# Patient Record
Sex: FEMALE | Race: WHITE | HISPANIC OR LATINO | Employment: FULL TIME | URBAN - NONMETROPOLITAN AREA
[De-identification: names, ages, dates, MRNs, and addresses within clinical notes are randomized per-mention and may not be internally consistent; named-entity substitution may affect disease eponyms.]

---

## 2017-02-09 ENCOUNTER — OFFICE VISIT (OUTPATIENT)
Dept: GASTROENTEROLOGY | Facility: CLINIC | Age: 52
End: 2017-02-09

## 2017-02-09 ENCOUNTER — PREP FOR SURGERY (OUTPATIENT)
Dept: GASTROENTEROLOGY | Facility: CLINIC | Age: 52
End: 2017-02-09

## 2017-02-09 VITALS
BODY MASS INDEX: 26.12 KG/M2 | HEIGHT: 64 IN | HEART RATE: 79 BPM | WEIGHT: 153 LBS | DIASTOLIC BLOOD PRESSURE: 80 MMHG | RESPIRATION RATE: 15 BRPM | TEMPERATURE: 98.6 F | SYSTOLIC BLOOD PRESSURE: 130 MMHG

## 2017-02-09 DIAGNOSIS — Z12.11 COLON CANCER SCREENING: Primary | ICD-10-CM

## 2017-02-09 DIAGNOSIS — K59.00 CONSTIPATION, UNSPECIFIED CONSTIPATION TYPE: Chronic | ICD-10-CM

## 2017-02-09 DIAGNOSIS — K59.00 CONSTIPATION, UNSPECIFIED CONSTIPATION TYPE: ICD-10-CM

## 2017-02-09 PROCEDURE — 99214 OFFICE O/P EST MOD 30 MIN: CPT | Performed by: NURSE PRACTITIONER

## 2017-02-09 RX ORDER — SODIUM CHLORIDE 9 MG/ML
70 INJECTION, SOLUTION INTRAVENOUS CONTINUOUS PRN
Status: CANCELLED | OUTPATIENT
Start: 2017-02-09

## 2017-02-09 RX ORDER — SODIUM CHLORIDE 0.9 % (FLUSH) 0.9 %
1-10 SYRINGE (ML) INJECTION AS NEEDED
Status: CANCELLED | OUTPATIENT
Start: 2017-02-09

## 2017-02-09 NOTE — PROGRESS NOTES
[address] [facility] [address]    (H) [phone]  (W) [phone]    Chief Complaint   Patient presents with   • Constipation   • Colon Cancer Screening     The patient denies recent change in bowel habits. There is no diarrhea. There is a long-standing history of constipation. The patient states she may have 1 bowel movement daily, but occasionally may go 2-3 days before having a bowel movement. She is not taking anything for the constipation. There is no history of abdominal pain. There is no history of overt GI bleed (hematemesis melena or hematochezia). The patient denies nausea or vomiting. There is no history of reflux. The patient denies dysphagia or odynophagia. There is no history of recent significant weight loss. There is no history of liver disease in the past. There is no family history of colon cancer. The patient has not had a colonoscopy in the past.    Constipation   This is a chronic problem. The current episode started more than 1 year ago (over 2 years). The problem is unchanged. Her stool frequency is 1 time per day. The stool is described as firm. The patient is on a high fiber diet. She does not exercise regularly. There has not been adequate water intake. Associated symptoms include back pain. Pertinent negatives include no abdominal pain, diarrhea, fever, hematochezia, melena, nausea or vomiting. She has tried nothing for the symptoms.     Review of Systems   Constitutional: Negative for appetite change, chills, fatigue, fever and unexpected weight change.   HENT: Negative for mouth sores, nosebleeds and trouble swallowing.    Eyes: Negative for discharge and redness.   Respiratory: Negative for apnea, cough and shortness of breath.    Cardiovascular: Positive for palpitations. Negative for chest pain and leg swelling.   Gastrointestinal: Positive for constipation. Negative for abdominal distention, abdominal pain, anal bleeding, blood in stool, diarrhea, hematochezia, melena, nausea  "and vomiting.   Endocrine: Negative for cold intolerance, heat intolerance and polydipsia.   Genitourinary: Negative for dysuria, hematuria and urgency.   Musculoskeletal: Positive for arthralgias and back pain. Negative for joint swelling and myalgias.   Skin: Negative for rash.   Allergic/Immunologic: Negative for food allergies and immunocompromised state.   Neurological: Negative for dizziness, seizures, syncope and headaches.   Hematological: Negative for adenopathy. Does not bruise/bleed easily.   Psychiatric/Behavioral: Negative for dysphoric mood. The patient is not nervous/anxious and is not hyperactive.      Patient Active Problem List   Diagnosis   • Routine adult health maintenance   • Hyperlipidemia   • Colon cancer screening   • Constipation     Past Medical History   Diagnosis Date   • Lower back pain    • MVP (mitral valve prolapse)    • Palpitations    • Snores      Past Surgical History   Procedure Laterality Date   • Partial hysterectomy  2003   •  section       Family History   Problem Relation Age of Onset   • Pancreatic cancer Mother    • Colon cancer Neg Hx    • Liver cancer Neg Hx    • Liver disease Neg Hx    • Stomach cancer Neg Hx    • Esophageal cancer Neg Hx      Social History   Substance Use Topics   • Smoking status: Never Smoker   • Smokeless tobacco: Not on file   • Alcohol use No     No current outpatient prescriptions on file.  No Known Allergies  Visit Vitals   • /80   • Pulse 79   • Temp 98.6 °F (37 °C)   • Resp 15   • Ht 64\" (162.6 cm)   • Wt 153 lb (69.4 kg)   • BMI 26.26 kg/m2     Physical Exam   Constitutional: She is oriented to person, place, and time. She appears well-developed and well-nourished. No distress.   HENT:   Head: Normocephalic and atraumatic.   Right Ear: Hearing and external ear normal.   Left Ear: Hearing and external ear normal.   Nose: Nose normal.   Mouth/Throat: Oropharynx is clear and moist and mucous membranes are normal. Mucous " membranes are not pale, not dry and not cyanotic. No oral lesions. No oropharyngeal exudate.   Eyes: Conjunctivae and EOM are normal. Right eye exhibits no discharge. Left eye exhibits no discharge.   Neck: Trachea normal. Neck supple. No JVD present. No edema present. No thyroid mass and no thyromegaly present.   Cardiovascular: Normal rate, regular rhythm, S2 normal and normal heart sounds.  Exam reveals no gallop, no S3 and no friction rub.    No murmur heard.  Pulmonary/Chest: Effort normal and breath sounds normal. No respiratory distress. She exhibits no tenderness.   Abdominal: Normal appearance and bowel sounds are normal. She exhibits no distension, no ascites and no mass. There is no splenomegaly or hepatomegaly. There is no tenderness. There is no rigidity, no rebound and no guarding. No hernia.       Vascular Status -  Her exam exhibits no right foot edema. Her exam exhibits no left foot edema.  Lymphadenopathy:     She has no cervical adenopathy.        Left: No supraclavicular adenopathy present.   Neurological: She is alert and oriented to person, place, and time. She has normal strength. No cranial nerve deficit or sensory deficit.   Skin: No rash noted. She is not diaphoretic. No cyanosis. No pallor. Nails show no clubbing.   Psychiatric: She has a normal mood and affect.   Nursing note and vitals reviewed.    Laboratory Tests:    Upon review of records:    Dated 12/23/2016 glucose 61 BUN 17 creatinine 0.67 sodium 138 potassium 3.8 chloride 103 CO2 29 calcium 9.5 albumin 4.2 total bilirubin 0.4 alkaline phosphatase 78 AST 17 ALT 17 WBC 6.2 hemoglobin 13.3 hematocrit 40.2 platelet count 185 MCV 88.8 TSH 0.84    Diana was seen today for constipation and colon cancer screening.    Diagnoses and all orders for this visit:    Colon cancer screening  Comments:  No previous colonoscopy. No family history of colon cancer.    Constipation, unspecified constipation type  Comments:  History of long-standing  recurrent constipation.        Plan   Patient Instructions   1. High fiber diet with liberal water intake. Discussed in detail.  2. Colonoscopy: Description of the procedure, risks, benefits, alternatives and options, including nonoperative options, were discussed with the patient in detail. The patient understands and wishes to proceed.    Patient Care Team:  Deon Garcia MD as PCP - General    ZACK Morton

## 2017-02-09 NOTE — PATIENT INSTRUCTIONS
1. High fiber diet with liberal water intake. Discussed in detail.  2. Colonoscopy: Description of the procedure, risks, benefits, alternatives and options, including nonoperative options, were discussed with the patient in detail. The patient understands and wishes to proceed.

## 2017-02-22 ENCOUNTER — ANESTHESIA (OUTPATIENT)
Dept: GASTROENTEROLOGY | Facility: HOSPITAL | Age: 52
End: 2017-02-22

## 2017-02-22 ENCOUNTER — ANESTHESIA EVENT (OUTPATIENT)
Dept: GASTROENTEROLOGY | Facility: HOSPITAL | Age: 52
End: 2017-02-22

## 2017-02-22 ENCOUNTER — HOSPITAL ENCOUNTER (OUTPATIENT)
Facility: HOSPITAL | Age: 52
Setting detail: HOSPITAL OUTPATIENT SURGERY
Discharge: HOME | End: 2017-02-22
Attending: INTERNAL MEDICINE | Admitting: INTERNAL MEDICINE

## 2017-02-22 VITALS
SYSTOLIC BLOOD PRESSURE: 133 MMHG | TEMPERATURE: 98.1 F | WEIGHT: 153 LBS | RESPIRATION RATE: 18 BRPM | BODY MASS INDEX: 26.12 KG/M2 | HEIGHT: 64 IN | DIASTOLIC BLOOD PRESSURE: 75 MMHG | HEART RATE: 78 BPM | OXYGEN SATURATION: 95 %

## 2017-02-22 DIAGNOSIS — Z12.11 COLON CANCER SCREENING: ICD-10-CM

## 2017-02-22 DIAGNOSIS — K59.00 CONSTIPATION, UNSPECIFIED CONSTIPATION TYPE: ICD-10-CM

## 2017-02-22 PROCEDURE — 25010000002 PROPOFOL 1000 MG/100ML EMULSION: Performed by: NURSE ANESTHETIST, CERTIFIED REGISTERED

## 2017-02-22 PROCEDURE — 25010000002 PROPOFOL 200 MG/20ML EMULSION: Performed by: NURSE ANESTHETIST, CERTIFIED REGISTERED

## 2017-02-22 PROCEDURE — G0121 COLON CA SCRN NOT HI RSK IND: HCPCS | Performed by: INTERNAL MEDICINE

## 2017-02-22 PROCEDURE — 25010000002 FENTANYL CITRATE (PF) 100 MCG/2ML SOLUTION: Performed by: NURSE ANESTHETIST, CERTIFIED REGISTERED

## 2017-02-22 RX ORDER — PROPOFOL 10 MG/ML
INJECTION, EMULSION INTRAVENOUS CONTINUOUS PRN
Status: DISCONTINUED | OUTPATIENT
Start: 2017-02-22 | End: 2017-02-22 | Stop reason: SURG

## 2017-02-22 RX ORDER — SODIUM CHLORIDE 0.9 % (FLUSH) 0.9 %
1-10 SYRINGE (ML) INJECTION AS NEEDED
Status: DISCONTINUED | OUTPATIENT
Start: 2017-02-22 | End: 2017-02-22 | Stop reason: HOSPADM

## 2017-02-22 RX ORDER — SODIUM CHLORIDE 9 MG/ML
70 INJECTION, SOLUTION INTRAVENOUS CONTINUOUS PRN
Status: DISCONTINUED | OUTPATIENT
Start: 2017-02-22 | End: 2017-02-22 | Stop reason: HOSPADM

## 2017-02-22 RX ORDER — PROPOFOL 10 MG/ML
INJECTION, EMULSION INTRAVENOUS AS NEEDED
Status: DISCONTINUED | OUTPATIENT
Start: 2017-02-22 | End: 2017-02-22 | Stop reason: SURG

## 2017-02-22 RX ORDER — FENTANYL CITRATE 50 UG/ML
INJECTION, SOLUTION INTRAMUSCULAR; INTRAVENOUS AS NEEDED
Status: DISCONTINUED | OUTPATIENT
Start: 2017-02-22 | End: 2017-02-22 | Stop reason: SURG

## 2017-02-22 RX ADMIN — PROPOFOL 100 MCG/KG/MIN: 10 INJECTION, EMULSION INTRAVENOUS at 09:35

## 2017-02-22 RX ADMIN — FENTANYL CITRATE 100 MCG: 50 INJECTION, SOLUTION INTRAMUSCULAR; INTRAVENOUS at 09:34

## 2017-02-22 RX ADMIN — SODIUM CHLORIDE: 9 INJECTION, SOLUTION INTRAVENOUS at 09:48

## 2017-02-22 RX ADMIN — PROPOFOL 80 MG: 10 INJECTION, EMULSION INTRAVENOUS at 09:34

## 2017-02-22 RX ADMIN — PROPOFOL 50 MG: 10 INJECTION, EMULSION INTRAVENOUS at 09:39

## 2017-02-22 RX ADMIN — SODIUM CHLORIDE 70 ML/HR: 9 INJECTION, SOLUTION INTRAVENOUS at 09:13

## 2017-02-22 NOTE — OP NOTE
PROCEDURE:  Colonoscopy to the terminal ileum.    DATE OF PROCEDURE:  2/22/2017    REFERRING PROVIDER:  Deon Garcia M.D.     INSTRUMENT USED:  Olympus PCF H180 AL videocolonoscope.  Olympus PCF H190DL videocolonoscope.     INDICATIONS OF THE PROCEDURE:   This is a 51-year-old white female for colon cancer screening.  There is history of constipation.     BIOPSIES:  None.      PREPARATION:  [place] prep score: 9 (3+3+3).     PHOTOGRAPHS:  Photographs were included in the medical records.     MEDICATIONS:  MAC.       CONSENT/PREPROCEDURE EVALUATION:  Risks, benefits, alternatives and options of the procedure including risks of sedation/anesthesia were discussed with the patient and informed consent was obtained prior to the procedure.  History and physical examination were performed and nothing precluded the test.      REPORT:  The patient was placed in left lateral decubitus position and a digital examination was performed.  Once under the influence of IV sedation, the instrument was inserted into the rectum and advanced under direct vision to cecum which was identified by the ileocecal valve, triradiate folds and appendiceal orifice. The scope was then maneuvered into the terminal ileum.        FINDINGS:      Digital rectal examination:  Good anal tone.  No perianal pathology.  No mass.        Terminal ileum:  4-5 cm. Normal.        Cecum and ascending colon: Normal.       Hepatic flexure, transverse colon, splenic flexure:  Normal.         Descending colon, sigmoid colon and rectum:  Scant early diverticular change was noted in the left colon.  A retroflex examination within the rectum revealed internal hemorrhoids.        The scope was then straightened, the lower GI tract was decompressed, and the scope was pulled out of the patient.  The patient tolerated the procedure well.  There were no immediate complications and the patient was transferred in stable condition for post procedure observation.       TECHNICAL  DATA: [place] prep score: 9 (3+3+3).    Difficulty of examination:  Average.   Withdrawal time: 8 min.  Retroflex examination in right colon: Yes.  Second look Rectum to cecum with decompression: Yes.    DIAGNOSES:    Scant early diverticular change in the left colon.  Internal hemorrhoids.      RECOMMENDATIONS:     1.  Follow biopsies.    2.  Follow-up:  4 weeks.    3.  Followup colonoscopy in 10 years.     4.  Dietary instructions.     Thank you very much for letting me participate in the care of this patient. Please do not hesitate to call me if you have any questions.

## 2017-02-22 NOTE — ANESTHESIA POSTPROCEDURE EVALUATION
Patient: Diana Flores    Procedure Summary     Date Anesthesia Start Anesthesia Stop Room / Location    02/22/17 0929  [facility]       Procedure Diagnosis Surgeon Provider    COLONOSCOPY (N/A Anus) Colon cancer screening; Constipation, unspecified constipation type  (Colon cancer screening [Z12.11]; Constipation, unspecified constipation type [K59.00]) MD Matt Rain CRNA          Anesthesia Type: MAC  Last vitals  /65 (02/22/17 0909)    Temp 98.2 °F (36.8 °C) (02/22/17 0909)    Pulse 99 (02/22/17 0909)   Resp 16 (02/22/17 0909)    SpO2 96 % (02/22/17 0909)      Post Anesthesia Care and Evaluation    Patient location during evaluation: bedside  Patient participation: complete - patient participated  Level of consciousness: awake and alert  Pain score: 0  Pain management: satisfactory to patient  Airway patency: patent  Anesthetic complications: No anesthetic complications  PONV Status: none  Cardiovascular status: acceptable and hemodynamically stable  Respiratory status: acceptable  Hydration status: acceptable

## 2017-02-22 NOTE — ANESTHESIA PREPROCEDURE EVALUATION
Anesthesia Evaluation     Patient summary reviewed and Nursing notes reviewed   history of anesthetic complications (low blood pressure after hysterectomy in 2002):  NPO Status: > 8 hours   Airway   Mallampati: II  TM distance: >3 FB  no difficulty expected  Dental - normal exam     Pulmonary - normal exam   (-) pneumonia, asthma, shortness of breath, sleep apnea, not a smoker, pulmonary embolism  Cardiovascular - normal exam  Exercise tolerance: good (4-7 METS)    (+) valvular problems/murmurs MVP,   (-) pacemaker, hypertension, CAD, [place]      Neuro/Psych  (-) TIA, CVA, headaches, dizziness/light headedness, syncope, numbness, psychiatric history, poor historian  GI/Hepatic/Renal/Endo    (-)  obesity, hepatitis, liver disease, renal disease, diabetes, hypothyroidism, hyperthyroidism    Musculoskeletal     (+) back pain (chronic low back pain),   Abdominal    Substance History   (+) alcohol use (occasional),   (-) drug use     OB/GYN      Comment: hysterectomy      Other   (+) arthritis                             Anesthesia Plan    ASA 2     MAC   (Risks and benefits discussed including risk of aspiration, recall and dental damage. All patient questions answered. Will continue with POC.)  intravenous induction   Anesthetic plan and risks discussed with patient.

## 2017-02-22 NOTE — H&P (VIEW-ONLY)
[address] [facility] [address]    (H) [phone]  (W) [phone]    Chief Complaint   Patient presents with   • Constipation   • Colon Cancer Screening     The patient denies recent change in bowel habits. There is no constipation. There is a long-standing history of constipation. The patient states she may have 1 bowel movement daily, but occasionally may go 2-3 days before having a bowel movement. She is not taking anything for the constipation. There is no history of abdominal pain. There is no history of overt GI bleed (hematemesis melena or hematochezia). The patient denies nausea or vomiting. There is no history of reflux. The patient denies dysphagia or odynophagia. There is no history of recent significant weight loss. There is no history of liver disease in the past. There is no family history of colon cancer. The patient has not had a colonoscopy in the past.    Constipation   This is a chronic problem. The current episode started more than 1 year ago (over 2 years). The problem is unchanged. Her stool frequency is 1 time per day. The stool is described as firm. The patient is on a high fiber diet. She does not exercise regularly. There has not been adequate water intake. Associated symptoms include back pain. Pertinent negatives include no abdominal pain, diarrhea, fever, hematochezia, melena, nausea or vomiting. She has tried nothing for the symptoms.     Review of Systems   Constitutional: Negative for appetite change, chills, fatigue, fever and unexpected weight change.   HENT: Negative for mouth sores, nosebleeds and trouble swallowing.    Eyes: Negative for discharge and redness.   Respiratory: Negative for apnea, cough and shortness of breath.    Cardiovascular: Positive for palpitations. Negative for chest pain and leg swelling.   Gastrointestinal: Positive for constipation. Negative for abdominal distention, abdominal pain, anal bleeding, blood in stool, diarrhea, hematochezia, melena,  "nausea and vomiting.   Endocrine: Negative for cold intolerance, heat intolerance and polydipsia.   Genitourinary: Negative for dysuria, hematuria and urgency.   Musculoskeletal: Positive for arthralgias and back pain. Negative for joint swelling and myalgias.   Skin: Negative for rash.   Allergic/Immunologic: Negative for food allergies and immunocompromised state.   Neurological: Negative for dizziness, seizures, syncope and headaches.   Hematological: Negative for adenopathy. Does not bruise/bleed easily.   Psychiatric/Behavioral: Negative for dysphoric mood. The patient is not nervous/anxious and is not hyperactive.      Patient Active Problem List   Diagnosis   • Routine adult health maintenance   • Hyperlipidemia   • Colon cancer screening   • Constipation     Past Medical History   Diagnosis Date   • Lower back pain    • MVP (mitral valve prolapse)    • Palpitations    • Snores      Past Surgical History   Procedure Laterality Date   • Partial hysterectomy  2003   •  section       Family History   Problem Relation Age of Onset   • Pancreatic cancer Mother    • Colon cancer Neg Hx    • Liver cancer Neg Hx    • Liver disease Neg Hx    • Stomach cancer Neg Hx    • Esophageal cancer Neg Hx      Social History   Substance Use Topics   • Smoking status: Never Smoker   • Smokeless tobacco: Not on file   • Alcohol use No     No current outpatient prescriptions on file.  No Known Allergies  Visit Vitals   • /80   • Pulse 79   • Temp 98.6 °F (37 °C)   • Resp 15   • Ht 64\" (162.6 cm)   • Wt 153 lb (69.4 kg)   • BMI 26.26 kg/m2     Physical Exam   Constitutional: She is oriented to person, place, and time. She appears well-developed and well-nourished. No distress.   HENT:   Head: Normocephalic and atraumatic.   Right Ear: Hearing and external ear normal.   Left Ear: Hearing and external ear normal.   Nose: Nose normal.   Mouth/Throat: Oropharynx is clear and moist and mucous membranes are normal. Mucous " membranes are not pale, not dry and not cyanotic. No oral lesions. No oropharyngeal exudate.   Eyes: Conjunctivae and EOM are normal. Right eye exhibits no discharge. Left eye exhibits no discharge.   Neck: Trachea normal. Neck supple. No JVD present. No edema present. No thyroid mass and no thyromegaly present.   Cardiovascular: Normal rate, regular rhythm, S2 normal and normal heart sounds.  Exam reveals no gallop, no S3 and no friction rub.    No murmur heard.  Pulmonary/Chest: Effort normal and breath sounds normal. No respiratory distress. She exhibits no tenderness.   Abdominal: Normal appearance and bowel sounds are normal. She exhibits no distension, no ascites and no mass. There is no splenomegaly or hepatomegaly. There is no tenderness. There is no rigidity, no rebound and no guarding. No hernia.       Vascular Status -  Her exam exhibits no right foot edema. Her exam exhibits no left foot edema.  Lymphadenopathy:     She has no cervical adenopathy.        Left: No supraclavicular adenopathy present.   Neurological: She is alert and oriented to person, place, and time. She has normal strength. No cranial nerve deficit or sensory deficit.   Skin: No rash noted. She is not diaphoretic. No cyanosis. No pallor. Nails show no clubbing.   Psychiatric: She has a normal mood and affect.   Nursing note and vitals reviewed.    Laboratory Tests:    Upon review of records:    Dated 12/23/2016 glucose 61 BUN 17 creatinine 0.67 sodium 138 potassium 3.8 chloride 103 CO2 29 calcium 9.5 albumin 4.2 total bilirubin 0.4 alkaline phosphatase 78 AST 17 ALT 17 WBC 6.2 hemoglobin 13.3 hematocrit 40.2 platelet count 185 MCV 88.8 TSH 0.84    Diana was seen today for constipation and colon cancer screening.    Diagnoses and all orders for this visit:    Colon cancer screening  Comments:  No previous colonoscopy. No family history of colon cancer.    Constipation, unspecified constipation type  Comments:  History of long-standing  recurrent constipation.        Plan   Patient Instructions   1. High fiber diet with liberal water intake. Discussed in detail.  2. Colonoscopy: Description of the procedure, risks, benefits, alternatives and options, including nonoperative options, were discussed with the patient in detail. The patient understands and wishes to proceed.    Patient Care Team:  Deon Garcia MD as PCP - General    ZACK Morton

## 2017-02-22 NOTE — OP NOTE
PROCEDURE:  Colonoscopy to the terminal ileum.      DATE OF PROCEDURE:  2/22/2017    REFERRING PROVIDER:  Deon Garcia M.D.     INSTRUMENT USED:  Olympus PCF H190 videocolonoscope.     INDICATIONS OF THE PROCEDURE:  This is a 51-year-old white female for colon cancer screening.  There is history of constipation.      BIOPSIES: None.       PREPARATION:  [place] prep score: 9 (3+3+3).     PHOTOGRAPHS:  Photographs were included in the medical records.     MEDICATIONS:  MAC.       CONSENT/PREPROCEDURE EVALUATION:  Risks, benefits, alternatives and options of the procedure including risks of sedation/anesthesia were discussed with the patient and informed consent was obtained prior to the procedure.  History and physical examination were performed and nothing precluded the test.      REPORT:  The patient was placed in left lateral decubitus position and a digital examination was performed.  Once under the influence of IV sedation, the instrument was inserted into the rectum and advanced under direct vision to cecum which was identified by the ileocecal valve, triradiate folds and appendiceal orifice. The scope was then maneuvered into the terminal ileum.        FINDINGS:      Digital rectal examination:  Good anal tone.  No perianal pathology.  No mass.        Terminal ileum:  4-5 cm. Normal.        Cecum and ascending colon: Normal.       Hepatic flexure, transverse colon, splenic flexure:  Normal.         Descending colon, sigmoid colon and rectum: Scant early diverticular change was noted in the left colon.  A retroflex examination within the rectum revealed internal hemorrhoids.        The scope was then straightened, the lower GI tract was decompressed, and the scope was pulled out of the patient.  The patient tolerated the procedure well.  There were no immediate complications and the patient was transferred in stable condition for post procedure observation.       TECHNICAL DATA: [place] prep score: 9 (3+3+3).     Difficulty of examination:  Average.   Withdrawal time: 8 min.  Retroflex examination in right colon: Yes.  Second look Rectum to cecum with decompression: Yes.    DIAGNOSES:    Scant diverticular change in the left colon.  Internal hemorrhoids.      RECOMMENDATIONS:     1.  Follow biopsies.    2.  Follow-up:    4 weeks.  3.  Followup colonoscopy in 10 years.     4.  Dietary instructions.     Thank you very much for letting me participate in the care of this patient. Please do not hesitate to call me if you have any questions.

## 2017-02-22 NOTE — PLAN OF CARE
Problem: GI Endoscopy (Adult)  Goal: Signs and Symptoms of Listed Potential Problems Will be Absent or Manageable (GI Endoscopy)  Outcome: Outcome(s) achieved Date Met:  02/22/17

## 2017-02-22 NOTE — DISCHARGE INSTRUCTIONS
Diet:     Low Fat Diet.     Take fiber supplement.  · Fiber One Cereal-40% Nutty Clusters 1/4 cup daily   · Nino's All Bran-Bran Buds 1/4 cup daily.  · Use skim, 1, 2 % or soy milk.     Drink 5-6 glasses of water daily.    Blood Thinner Directions:    ·  Avoid Aspirin & other NSAIDS for _7__ days.  Tylenol is okay.    Treatments:    · Busby magnesium caplet (Over-the-counter).  Take one orally at night.      Call [facility] at [phone] or come to the Emergency   Department if you experience the following: Chest pain, abdominal pain, bleeding  (vomiting of blood or coffee colored material, black stools or candy blood in stools),   fever/chills, nausea and vomiting or dizziness.

## 2017-03-28 ENCOUNTER — OFFICE VISIT (OUTPATIENT)
Dept: GASTROENTEROLOGY | Facility: CLINIC | Age: 52
End: 2017-03-28

## 2017-03-28 VITALS
DIASTOLIC BLOOD PRESSURE: 72 MMHG | SYSTOLIC BLOOD PRESSURE: 138 MMHG | TEMPERATURE: 98.4 F | HEIGHT: 64 IN | BODY MASS INDEX: 26.63 KG/M2 | RESPIRATION RATE: 18 BRPM | WEIGHT: 156 LBS | HEART RATE: 75 BPM

## 2017-03-28 DIAGNOSIS — K57.30 DIVERTICULOSIS OF COLON WITHOUT HEMORRHAGE: Primary | ICD-10-CM

## 2017-03-28 PROCEDURE — 99213 OFFICE O/P EST LOW 20 MIN: CPT | Performed by: INTERNAL MEDICINE

## 2017-03-28 NOTE — PATIENT INSTRUCTIONS
1. Low-fat-High-fiber diet with liberal water intake.  2. Follow-up colonoscopy in 10 years.  February 2027.  3. Discussed with the patient in detail.  Opportunity was given to ask questions.  This included the findings including diverticular change, potential complications related to diverticulosis, treatment, and prevention of further progression were discussed in detail.

## 2017-03-28 NOTE — PROGRESS NOTES
[address] [facility] [address]    (H) [phone]  (W) [phone]    Chief Complaint   Patient presents with   • Follow-up     History of Present Illness    The patient came back for follow visit today. She feels okay.  The patient denies abdominal pain.  There is no nausea or vomiting.  The patient denies significant reflux.  There is no dysphagia or odynophagia.  There is no history of overt GI bleed (Hematemesis, melena or hematochezia).  There is no history of recent weight loss. The patient denies history of liver or pancreatic disease.  There is no history of anemia.      Review of Systems   Constitutional: Negative for appetite change, chills, fatigue, fever and unexpected weight change.   HENT: Negative for mouth sores, nosebleeds and trouble swallowing.    Eyes: Negative for discharge and redness.   Respiratory: Negative for apnea, cough and shortness of breath.    Cardiovascular: Positive for palpitations. Negative for chest pain and leg swelling.   Gastrointestinal: Negative for abdominal distention, abdominal pain, anal bleeding, blood in stool, constipation, diarrhea, nausea and vomiting.   Endocrine: Negative for cold intolerance, heat intolerance and polydipsia.   Genitourinary: Negative for dysuria, hematuria and urgency.   Musculoskeletal: Positive for arthralgias. Negative for joint swelling and myalgias.   Skin: Negative for rash.   Allergic/Immunologic: Negative for food allergies and immunocompromised state.   Neurological: Negative for dizziness, seizures, syncope and headaches.   Hematological: Negative for adenopathy. Does not bruise/bleed easily.   Psychiatric/Behavioral: Negative for dysphoric mood. The patient is not nervous/anxious and is not hyperactive.      Patient Active Problem List   Diagnosis   • Routine adult health maintenance   • Hyperlipidemia   • Colon cancer screening   • Constipation     Past Medical History:   Diagnosis Date   • Arthritis    • Lower back pain    •  "MVP (mitral valve prolapse)    • MVP (mitral valve prolapse)    • Palpitations    • Snores    • Wears glasses      Past Surgical History:   Procedure Laterality Date   •  SECTION     • COLONOSCOPY N/A 2017    Procedure: COLONOSCOPY;  Surgeon: Michele Marion MD;  Location: [facility];  Service:    • PARTIAL HYSTERECTOMY  2003   • TRIGGER FINGER RELEASE Right     3RD FINGER     Family History   Problem Relation Age of Onset   • Pancreatic cancer Mother    • Colon cancer Neg Hx    • Liver cancer Neg Hx    • Liver disease Neg Hx    • Stomach cancer Neg Hx    • Esophageal cancer Neg Hx      Social History   Substance Use Topics   • Smoking status: Never Smoker   • Smokeless tobacco: Never Used   • Alcohol use Yes      Comment: SOCIAL       Current Outpatient Prescriptions:   •  Multiple Vitamin (MULTI VITAMIN DAILY PO), Take  by mouth., Disp: , Rfl:   No Known Allergies  /72  Pulse 75  Temp 98.4 °F (36.9 °C)  Resp 18  Ht 64\" (162.6 cm)  Wt 156 lb (70.8 kg)  BMI 26.78 kg/m2     Physical Exam   Constitutional: She is oriented to person, place, and time. She appears well-developed and well-nourished. No distress.   HENT:   Head: Normocephalic and atraumatic.   Right Ear: Hearing and external ear normal.   Left Ear: Hearing and external ear normal.   Nose: Nose normal.   Mouth/Throat: Oropharynx is clear and moist and mucous membranes are normal. Mucous membranes are not pale, not dry and not cyanotic. No oral lesions. No oropharyngeal exudate.   Eyes: Conjunctivae and EOM are normal. Right eye exhibits no discharge. Left eye exhibits no discharge. No scleral icterus.   Neck: Trachea normal. Neck supple. No JVD present. No edema present. No thyroid mass and no thyromegaly present.   Cardiovascular: Normal rate, regular rhythm, S2 normal and normal heart sounds.  Exam reveals no gallop, no S3 and no friction rub.    No murmur heard.  Pulmonary/Chest: Effort normal and breath sounds normal. No " respiratory distress. She has no wheezes. She has no rales. She exhibits no tenderness.   Abdominal: Soft. Normal appearance and bowel sounds are normal. She exhibits no distension, no ascites and no mass. There is no splenomegaly or hepatomegaly. There is no tenderness. There is no rigidity, no rebound and no guarding. No hernia.   Musculoskeletal: She exhibits no tenderness or deformity.       Vascular Status -  Her exam exhibits no right foot edema. Her exam exhibits no left foot edema.  Lymphadenopathy:     She has no cervical adenopathy.        Left: No supraclavicular adenopathy present.   Neurological: She is alert and oriented to person, place, and time. She has normal strength. No cranial nerve deficit or sensory deficit. She exhibits normal muscle tone. Coordination normal.   Skin: No rash noted. She is not diaphoretic. No cyanosis. No pallor. Nails show no clubbing.   Psychiatric: She has a normal mood and affect. Her behavior is normal. Judgment and thought content normal.   Nursing note and vitals reviewed.    Laboratory Tests:      Upon review of medical records:    Dated 12/23/2016 glucose 61 BUN 17 creatinine 0.67 sodium 138 potassium 3.8 chloride 103 CO2 29 calcium 9.5 albumin 4.2 total bilirubin 0.4 alkaline phosphatase 78 AST 17 ALT 17 WBC 6.2 hemoglobin 13.3 hematocrit 40.2 platelet count 185 MCV 88.8 TSH 0.84     Procedures:  Upon review of medical records:    Dated February 22, 2017 the patient underwent a colonoscopy to the terminal ileum which revealed:  Scant early diverticular change in the left colon.  Internal hemorrhoids. No biopsy.    Assessment and Plan:      Diana was seen today for follow-up.    Diagnoses and all orders for this visit:    Diverticulosis of colon without hemorrhage  Comments:  Uncomplicated.          Discussion:       Plan     Patient Instructions     1. Low-fat-High-fiber diet with liberal water intake.  2. Follow-up colonoscopy in 10 years.  February  2027.  3. Discussed with the patient in detail.  Opportunity was given to ask questions.  This included the findings including diverticular change, potential complications related to diverticulosis, treatment, and prevention of further progression were discussed in detail.        Patient Care Team:  Deon Garcia MD as PCP - General    Michele Marion MD

## 2017-05-26 ENCOUNTER — OFFICE VISIT (OUTPATIENT)
Dept: INTERNAL MEDICINE | Facility: CLINIC | Age: 52
End: 2017-05-26

## 2017-05-26 VITALS
SYSTOLIC BLOOD PRESSURE: 144 MMHG | RESPIRATION RATE: 14 BRPM | TEMPERATURE: 97.7 F | WEIGHT: 152.19 LBS | HEIGHT: 64 IN | BODY MASS INDEX: 25.98 KG/M2 | OXYGEN SATURATION: 98 % | DIASTOLIC BLOOD PRESSURE: 98 MMHG | HEART RATE: 70 BPM

## 2017-05-26 DIAGNOSIS — B97.89 SORE THROAT (VIRAL): Primary | ICD-10-CM

## 2017-05-26 DIAGNOSIS — J02.8 SORE THROAT (VIRAL): Primary | ICD-10-CM

## 2017-05-26 PROCEDURE — 99213 OFFICE O/P EST LOW 20 MIN: CPT | Performed by: NURSE PRACTITIONER

## 2017-07-06 ENCOUNTER — APPOINTMENT (OUTPATIENT)
Dept: PREADMISSION TESTING | Facility: HOSPITAL | Age: 52
End: 2017-07-06

## 2017-07-06 VITALS — WEIGHT: 155 LBS | HEIGHT: 64 IN | BODY MASS INDEX: 26.46 KG/M2

## 2017-07-06 RX ORDER — MULTIPLE VITAMINS W/ MINERALS TAB 9MG-400MCG
1 TAB ORAL DAILY
COMMUNITY
End: 2018-02-15

## 2017-07-21 ENCOUNTER — HOSPITAL ENCOUNTER (OUTPATIENT)
Facility: HOSPITAL | Age: 52
Setting detail: HOSPITAL OUTPATIENT SURGERY
Discharge: HOME | End: 2017-07-21
Attending: ORTHOPAEDIC SURGERY | Admitting: ORTHOPAEDIC SURGERY

## 2017-07-21 ENCOUNTER — ANESTHESIA (OUTPATIENT)
Dept: PERIOP | Facility: HOSPITAL | Age: 52
End: 2017-07-21

## 2017-07-21 ENCOUNTER — ANESTHESIA EVENT (OUTPATIENT)
Dept: PERIOP | Facility: HOSPITAL | Age: 52
End: 2017-07-21

## 2017-07-21 VITALS
DIASTOLIC BLOOD PRESSURE: 81 MMHG | OXYGEN SATURATION: 95 % | SYSTOLIC BLOOD PRESSURE: 139 MMHG | HEART RATE: 72 BPM | RESPIRATION RATE: 18 BRPM | TEMPERATURE: 97.4 F

## 2017-07-21 PROCEDURE — 25010000003 CEFAZOLIN PER 500 MG: Performed by: ORTHOPAEDIC SURGERY

## 2017-07-21 PROCEDURE — 25010000002 PROPOFOL 200 MG/20ML EMULSION: Performed by: NURSE ANESTHETIST, CERTIFIED REGISTERED

## 2017-07-21 PROCEDURE — 25010000002 ONDANSETRON PER 1 MG: Performed by: NURSE ANESTHETIST, CERTIFIED REGISTERED

## 2017-07-21 PROCEDURE — 25010000002 KETOROLAC TROMETHAMINE PER 15 MG: Performed by: NURSE ANESTHETIST, CERTIFIED REGISTERED

## 2017-07-21 PROCEDURE — 25010000002 DEXAMETHASONE PER 1 MG: Performed by: NURSE ANESTHETIST, CERTIFIED REGISTERED

## 2017-07-21 RX ORDER — BUPIVACAINE HYDROCHLORIDE AND EPINEPHRINE 5; 5 MG/ML; UG/ML
INJECTION, SOLUTION EPIDURAL; INTRACAUDAL; PERINEURAL
Status: DISCONTINUED
Start: 2017-07-21 | End: 2017-07-21 | Stop reason: HOSPADM

## 2017-07-21 RX ORDER — KETOROLAC TROMETHAMINE 30 MG/ML
INJECTION, SOLUTION INTRAMUSCULAR; INTRAVENOUS AS NEEDED
Status: DISCONTINUED | OUTPATIENT
Start: 2017-07-21 | End: 2017-07-21 | Stop reason: SURG

## 2017-07-21 RX ORDER — SODIUM CHLORIDE, SODIUM LACTATE, POTASSIUM CHLORIDE, CALCIUM CHLORIDE 600; 310; 30; 20 MG/100ML; MG/100ML; MG/100ML; MG/100ML
1000 INJECTION, SOLUTION INTRAVENOUS CONTINUOUS PRN
Status: DISCONTINUED | OUTPATIENT
Start: 2017-07-21 | End: 2017-07-21 | Stop reason: HOSPADM

## 2017-07-21 RX ORDER — DEXAMETHASONE SODIUM PHOSPHATE 4 MG/ML
INJECTION, SOLUTION INTRA-ARTICULAR; INTRALESIONAL; INTRAMUSCULAR; INTRAVENOUS; SOFT TISSUE AS NEEDED
Status: DISCONTINUED | OUTPATIENT
Start: 2017-07-21 | End: 2017-07-21 | Stop reason: SURG

## 2017-07-21 RX ORDER — PROMETHAZINE HYDROCHLORIDE 25 MG/ML
6.25 INJECTION, SOLUTION INTRAMUSCULAR; INTRAVENOUS ONCE AS NEEDED
Status: DISCONTINUED | OUTPATIENT
Start: 2017-07-21 | End: 2017-07-21 | Stop reason: HOSPADM

## 2017-07-21 RX ORDER — MAGNESIUM HYDROXIDE 1200 MG/15ML
LIQUID ORAL AS NEEDED
Status: DISCONTINUED | OUTPATIENT
Start: 2017-07-21 | End: 2017-07-21 | Stop reason: HOSPADM

## 2017-07-21 RX ORDER — PROMETHAZINE HYDROCHLORIDE 25 MG/1
25 TABLET ORAL ONCE AS NEEDED
Status: DISCONTINUED | OUTPATIENT
Start: 2017-07-21 | End: 2017-07-21 | Stop reason: HOSPADM

## 2017-07-21 RX ORDER — PROMETHAZINE HYDROCHLORIDE 25 MG/1
25 SUPPOSITORY RECTAL ONCE AS NEEDED
Status: DISCONTINUED | OUTPATIENT
Start: 2017-07-21 | End: 2017-07-21 | Stop reason: HOSPADM

## 2017-07-21 RX ORDER — EPINEPHRINE 1 MG/ML
INJECTION INTRAMUSCULAR; INTRAVENOUS; SUBCUTANEOUS
Status: DISCONTINUED
Start: 2017-07-21 | End: 2017-07-21 | Stop reason: HOSPADM

## 2017-07-21 RX ORDER — BUPIVACAINE HYDROCHLORIDE AND EPINEPHRINE 5; 5 MG/ML; UG/ML
INJECTION, SOLUTION EPIDURAL; INTRACAUDAL; PERINEURAL AS NEEDED
Status: DISCONTINUED | OUTPATIENT
Start: 2017-07-21 | End: 2017-07-21 | Stop reason: HOSPADM

## 2017-07-21 RX ORDER — SODIUM CHLORIDE 0.9 % (FLUSH) 0.9 %
1-10 SYRINGE (ML) INJECTION AS NEEDED
Status: DISCONTINUED | OUTPATIENT
Start: 2017-07-21 | End: 2017-07-21 | Stop reason: HOSPADM

## 2017-07-21 RX ORDER — HYDROCODONE BITARTRATE AND ACETAMINOPHEN 7.5; 325 MG/1; MG/1
1-2 TABLET ORAL EVERY 4 HOURS PRN
Qty: 50 TABLET | Refills: 0 | Status: SHIPPED | OUTPATIENT
Start: 2017-07-21 | End: 2018-02-15

## 2017-07-21 RX ORDER — PROPOFOL 10 MG/ML
INJECTION, EMULSION INTRAVENOUS AS NEEDED
Status: DISCONTINUED | OUTPATIENT
Start: 2017-07-21 | End: 2017-07-21 | Stop reason: SURG

## 2017-07-21 RX ORDER — CLINDAMYCIN PHOSPHATE 900 MG/50ML
900 INJECTION, SOLUTION INTRAVENOUS ONCE
Status: CANCELLED | OUTPATIENT
Start: 2017-07-21 | End: 2017-07-21

## 2017-07-21 RX ORDER — ONDANSETRON 2 MG/ML
INJECTION INTRAMUSCULAR; INTRAVENOUS AS NEEDED
Status: DISCONTINUED | OUTPATIENT
Start: 2017-07-21 | End: 2017-07-21 | Stop reason: SURG

## 2017-07-21 RX ORDER — HYDROCODONE BITARTRATE AND ACETAMINOPHEN 7.5; 325 MG/1; MG/1
TABLET ORAL
Status: COMPLETED
Start: 2017-07-21 | End: 2017-07-21

## 2017-07-21 RX ADMIN — Medication 25 MG: at 13:25

## 2017-07-21 RX ADMIN — SODIUM CHLORIDE, POTASSIUM CHLORIDE, SODIUM LACTATE AND CALCIUM CHLORIDE: 600; 310; 30; 20 INJECTION, SOLUTION INTRAVENOUS at 13:17

## 2017-07-21 RX ADMIN — HYDROCODONE BITARTRATE AND ACETAMINOPHEN 1 TABLET: 7.5; 325 TABLET ORAL at 15:20

## 2017-07-21 RX ADMIN — CEFAZOLIN 2 G: 1 INJECTION, POWDER, FOR SOLUTION INTRAVENOUS at 13:17

## 2017-07-21 RX ADMIN — DEXAMETHASONE SODIUM PHOSPHATE 8 MG: 4 INJECTION, SOLUTION INTRAMUSCULAR; INTRAVENOUS at 13:29

## 2017-07-21 RX ADMIN — PROPOFOL 150 MG: 10 INJECTION, EMULSION INTRAVENOUS at 13:21

## 2017-07-21 RX ADMIN — ONDANSETRON 4 MG: 2 INJECTION INTRAMUSCULAR; INTRAVENOUS at 13:29

## 2017-07-21 RX ADMIN — SODIUM CHLORIDE, POTASSIUM CHLORIDE, SODIUM LACTATE AND CALCIUM CHLORIDE 1000 ML: 600; 310; 30; 20 INJECTION, SOLUTION INTRAVENOUS at 11:12

## 2017-07-21 RX ADMIN — KETOROLAC TROMETHAMINE 30 MG: 30 INJECTION, SOLUTION INTRAMUSCULAR at 13:39

## 2017-07-21 NOTE — ANESTHESIA POSTPROCEDURE EVALUATION
Patient: Diana Flores    Procedure Summary     Date Anesthesia Start Anesthesia Stop Room / Location    07/21/17 1317 [id number] [facility] / [facility]       Procedure Diagnosis Surgeon Provider    KNEE ARTHROSCOPY LEFT  WITH PARTIAL MEDIAL and  LATERAL  MENISCECTOMY (Left Knee) No diagnosis on file. MD Matt Alvarez CRNA          Anesthesia Type: general  Last vitals  BP        Temp        Pulse       Resp        SpO2          Post Anesthesia Care and Evaluation    Patient location during evaluation: PACU  Patient participation: complete - patient participated  Level of consciousness: awake and alert  Pain score: 0  Pain management: satisfactory to patient  Airway patency: patent  Anesthetic complications: No anesthetic complications  PONV Status: none  Cardiovascular status: acceptable and hemodynamically stable  Respiratory status: acceptable  Hydration status: acceptable

## 2017-07-21 NOTE — ANESTHESIA PROCEDURE NOTES
Airway  Urgency: elective    Airway not difficult    General Information and Staff    Patient location during procedure: OR  CRNA: STANFORD LARSON    Indications and Patient Condition  Indications for airway management: airway protection    Preoxygenated: yes  Mask difficulty assessment: 1 - vent by mask    Final Airway Details  Final airway type: supraglottic airway      Successful airway: classic  Size 4    Number of attempts at approach: 1    Additional Comments  Easy atraumatic LMA placement

## 2017-07-21 NOTE — PLAN OF CARE
Problem: Perioperative Period (Adult)  Intervention: Promote Pulmonary Hygiene and Secretion Clearance    07/21/17 1347   Activity   Activity Type bedrest   Promote Aggressive Pulmonary Hygiene/Secretion Management   Cough And Deep Breathing done with encouragement       Intervention: Monitor/Manage Pain    07/21/17 4017   Safety Interventions   Medication Review/Management medications reviewed

## 2017-07-21 NOTE — PLAN OF CARE
Problem: Perioperative Period (Adult)  Goal: Signs and Symptoms of Listed Potential Problems Will be Absent or Manageable (Perioperative Period)  Outcome: Ongoing (interventions implemented as appropriate)    07/21/17 1438   Perioperative Period   Problems Assessed (Perioperative Period) all   Problems Present (Perioperative Period) none   Pt meets PACU discharge criteria.

## 2017-07-21 NOTE — PLAN OF CARE
Problem: Perioperative Period (Adult)  Goal: Signs and Symptoms of Listed Potential Problems Will be Absent or Manageable (Perioperative Period)  Outcome: Ongoing (interventions implemented as appropriate)    07/21/17 1056   Perioperative Period   Problems Assessed (Perioperative Period) all   Problems Present (Perioperative Period) none

## 2017-07-21 NOTE — DISCHARGE INSTRUCTIONS
Rest today  No pushing,pulling,tugging,heavy lifting, or strenuous activity   No major decision making,driving,or drinking alcoholic beverages for 24 hours due to the sedation you received  Always use good hand hygiene/washing technique  No driving on pain medicationsTo assist you in voiding:  Drink plenty of fluids  Listen to running water while attempting to void.    If you are unable to urinate and you have an uncomfortable urge to void or it has been   6 hours since you were discharged, return to the Emergency Room    Keep left lower extremity elevated up  Use ice pack as directed,do not use continuously  Use crutches as directed  Follow 's instructions as previously given

## 2017-07-21 NOTE — ANESTHESIA PREPROCEDURE EVALUATION
Anesthesia Evaluation     Patient summary reviewed and Nursing notes reviewed   NPO Solid Status: > 8 hours  NPO Liquid Status: > 8 hours     Airway   Mallampati: I  TM distance: >3 FB  Neck ROM: full  no difficulty expected  Dental      Pulmonary - normal exam    breath sounds clear to auscultation  Cardiovascular - normal exam    Rhythm: regular  Rate: normal    (+) valvular problems/murmurs MVP,       Neuro/Psych  GI/Hepatic/Renal/Endo      Musculoskeletal     Abdominal    Substance History      OB/GYN          Other                                        Anesthesia Plan    ASA 1     general     intravenous induction   Anesthetic plan and risks discussed with patient.

## 2017-07-21 NOTE — OP NOTE
[address],  [address]  [place]  [address]  [phone]    OPERATIVE REPORT      PATIENT NAME:  Diana Flores                            YOB: 1965         PREOP DIAGNOSIS:   Left  knee medial meniscal tear  POSTOP DIAGNOSIS:   Left knee medial and lateral meniscal tear     PROCEDURE:   Left  knee diagnostic arthroscopy partial medial and lateral meniscectomy    SURGEON:     Bakari Dangelo MD    OPERATIVE TEAM:   Circulator: Cynthia Salinas RN; Ghislaine Holloway RN  Scrub Person: Marli Marina    ANESTHETIST:  CRNA: Matt Triana CRNA    ANESTHESIA:   General    ESTIM BLOOD LOSS:   minimal    SPECIMENS:    None.    IMPLANTS:     None.    COMPLICATIONS:    None.    DISPOSITION:    Stable to recovery.    INDICATIONS:     Persistent Left  knee pain, swelling, stiffness, mechanical catching and dysfunction with clinical exam and imaging consistent with knee effusion, complex posterior horn medial meniscal tear and osteoarthritis.  Patient has failed all conservative management.  The option of elective knee arthroscopy evaluation and treatment discussed and the patient would like to proceed.  Risks, benefits and alternatives discussed and informed consent for surgical procedure obtained. Risks discussed including but not limited to anesthesia, infection and persistent or progressive knee joint symptoms.  Goals include the potential for pain relief, decreased swelling, improved mobility and function with the knee condition.  The patient presents for elective knee diagnostic arthroscopic evaluation and treatment.    Procedure:    Antibiotic prophylaxis was given.  Surgeon site marking and a time out were performed prior to the procedure.  Anesthesia was effective and well tolerated.  The knee and leg was prepped and draped in the usual sterile fashion.  Leg was exsanguinated with eschmarch tourniquet elevated to 300 mm mercury.    After sterile prep and drape a knee  arthroscopy was performed.    Evaluation of the medial side demonstrated 0,Evaluation of meniscus showed a small tear through the medial meniscus debridement shaver.   The knotch showed intact acl and pcl.   Lateral side demonstrated 0, meniscus demonstrated Malter through the lateral meniscus debridement with a shaver.    Evaluation of patello femoral joint showed 0, there were no loose bodies in the gutters.    Representative arthroscopic photos were saved.  The knee joint was irrigated and suctioned clear.  The portal sites were closed and a sterile dressing was applied. Anesthesia was effective and well tolerated.  There were no complications of the procedure.  The patient was transferred in stable condition to recovery.    Dusty Dangelo MD   7/21/2017

## 2018-02-15 ENCOUNTER — OFFICE VISIT (OUTPATIENT)
Dept: INTERNAL MEDICINE | Facility: CLINIC | Age: 53
End: 2018-02-15

## 2018-02-15 VITALS
TEMPERATURE: 98.3 F | SYSTOLIC BLOOD PRESSURE: 130 MMHG | HEART RATE: 96 BPM | BODY MASS INDEX: 25.1 KG/M2 | WEIGHT: 147 LBS | RESPIRATION RATE: 14 BRPM | OXYGEN SATURATION: 97 % | HEIGHT: 64 IN | DIASTOLIC BLOOD PRESSURE: 80 MMHG

## 2018-02-15 DIAGNOSIS — M79.605 PAIN OF LEFT LOWER EXTREMITY: Primary | ICD-10-CM

## 2018-02-15 DIAGNOSIS — Z00.00 ANNUAL PHYSICAL EXAM: ICD-10-CM

## 2018-02-15 DIAGNOSIS — M54.16 LUMBAR RADICULOPATHY: ICD-10-CM

## 2018-02-15 PROCEDURE — 99213 OFFICE O/P EST LOW 20 MIN: CPT | Performed by: INTERNAL MEDICINE

## 2018-02-15 RX ORDER — CYCLOBENZAPRINE HCL 10 MG
10 TABLET ORAL NIGHTLY PRN
Qty: 30 TABLET | Refills: 1 | OUTPATIENT
Start: 2018-02-15 | End: 2019-01-29

## 2018-02-15 RX ORDER — NAPROXEN 500 MG/1
500 TABLET ORAL 2 TIMES DAILY WITH MEALS
Qty: 40 TABLET | Refills: 1 | OUTPATIENT
Start: 2018-02-15 | End: 2019-01-29

## 2018-02-15 NOTE — PROGRESS NOTES
Subjective   Diana Flores is a 52 y.o. female.     Chief Complaint   Patient presents with   • Leg Problem     cramps - from knee down       Knee Pain    The incident occurred more than 1 week ago. There was no injury mechanism. The pain is present in the left knee. The quality of the pain is described as aching. The pain is moderate. The pain has been intermittent since onset. Pertinent negatives include no numbness or tingling. She reports no foreign bodies present. Exacerbated by: certain position. Treatments tried: magnesium. The treatment provided no relief.      Left ba    Current Outpatient Prescriptions:   •  Magnesium 70 MG capsule, Take  by mouth., Disp: , Rfl:   •  Nutritional Supplements (ESTROVEN PO), Take  by mouth., Disp: , Rfl:   •  cyclobenzaprine (FLEXERIL) 10 MG tablet, Take 1 tablet by mouth At Night As Needed for Muscle Spasms., Disp: 30 tablet, Rfl: 1  •  naproxen (NAPROSYN) 500 MG tablet, Take 1 tablet by mouth 2 (Two) Times a Day With Meals., Disp: 40 tablet, Rfl: 1    The following portions of the patient's history were reviewed and updated as appropriate: allergies, current medications, past family history, past medical history, past social history, past surgical history and problem list.    Review of Systems   Constitutional: Negative.    Respiratory: Negative.    Cardiovascular: Negative.    Gastrointestinal: Negative.    Musculoskeletal: Positive for myalgias. Negative for gait problem.   Skin: Negative.    Neurological: Negative for tingling and numbness.   Psychiatric/Behavioral: Negative.        Objective   Physical Exam   Musculoskeletal: She exhibits tenderness (left lower leg mild tender).       All tests have been reviewed.    Assessment/Plan   Diagnoses and all orders for this visit:    Pain of left lower extremity    Lumbar radiculopathy    Annual physical exam  -     Comprehensive Metabolic Panel  -     CBC & Differential  -     Lipid Panel  -     TSH    Other orders  -      Nutritional Supplements (ESTROVEN PO); Take  by mouth.  -     Magnesium 70 MG capsule; Take  by mouth.  -     cyclobenzaprine (FLEXERIL) 10 MG tablet; Take 1 tablet by mouth At Night As Needed for Muscle Spasms.  -     naproxen (NAPROSYN) 500 MG tablet; Take 1 tablet by mouth 2 (Two) Times a Day With Meals.

## 2019-06-18 ENCOUNTER — TELEPHONE (OUTPATIENT)
Dept: INTERNAL MEDICINE | Facility: CLINIC | Age: 54
End: 2019-06-18

## 2019-06-18 DIAGNOSIS — M54.9 BACK PAIN, UNSPECIFIED BACK LOCATION, UNSPECIFIED BACK PAIN LATERALITY, UNSPECIFIED CHRONICITY: Primary | ICD-10-CM

## 2019-06-19 DIAGNOSIS — M54.16 LUMBAR RADICULOPATHY: Primary | ICD-10-CM

## 2019-07-10 ENCOUNTER — HOSPITAL ENCOUNTER (INPATIENT)
Facility: HOSPITAL | Age: 54
LOS: 2 days | Discharge: HOME | End: 2019-07-12
Attending: EMERGENCY MEDICINE | Admitting: INTERNAL MEDICINE

## 2019-07-10 ENCOUNTER — APPOINTMENT (OUTPATIENT)
Dept: GENERAL RADIOLOGY | Facility: HOSPITAL | Age: 54
End: 2019-07-10

## 2019-07-10 DIAGNOSIS — I48.91 ATRIAL FIBRILLATION WITH RVR (HCC): Primary | ICD-10-CM

## 2019-07-10 LAB
ALBUMIN SERPL-MCNC: 4.6 G/DL (ref 3.5–5)
ALBUMIN/GLOB SERPL: 1.3 G/DL (ref 1–2)
ALP SERPL-CCNC: 117 U/L (ref 38–126)
ALT SERPL W P-5'-P-CCNC: 36 U/L (ref 13–69)
ANION GAP SERPL CALCULATED.3IONS-SCNC: 10.6 MMOL/L (ref 10–20)
AST SERPL-CCNC: 34 U/L (ref 15–46)
BASOPHILS # BLD AUTO: 0.03 10*3/MM3 (ref 0–0.2)
BASOPHILS NFR BLD AUTO: 0.3 % (ref 0–1.5)
BILIRUB SERPL-MCNC: 0.4 MG/DL (ref 0.2–1.3)
BUN BLD-MCNC: 18 MG/DL (ref 7–20)
BUN/CREAT SERPL: 25.7 (ref 7.1–23.5)
CALCIUM SPEC-SCNC: 9.6 MG/DL (ref 8.4–10.2)
CHLORIDE SERPL-SCNC: 103 MMOL/L (ref 98–107)
CO2 SERPL-SCNC: 28 MMOL/L (ref 26–30)
CREAT BLD-MCNC: 0.7 MG/DL (ref 0.6–1.3)
D DIMER PPP FEU-MCNC: 0.43 MCGFEU/ML (ref 0–0.57)
DEPRECATED RDW RBC AUTO: 39.8 FL (ref 37–54)
EOSINOPHIL # BLD AUTO: 0.14 10*3/MM3 (ref 0–0.4)
EOSINOPHIL NFR BLD AUTO: 1.5 % (ref 0.3–6.2)
ERYTHROCYTE [DISTWIDTH] IN BLOOD BY AUTOMATED COUNT: 12.2 % (ref 12.3–15.4)
GFR SERPL CREATININE-BSD FRML MDRD: 88 ML/MIN/1.73
GLOBULIN UR ELPH-MCNC: 3.6 GM/DL
GLUCOSE BLD-MCNC: 109 MG/DL (ref 74–98)
HCT VFR BLD AUTO: 43.2 % (ref 34–46.6)
HGB BLD-MCNC: 14.3 G/DL (ref 12–15.9)
HOLD SPECIMEN: NORMAL
IMM GRANULOCYTES # BLD AUTO: 0.02 10*3/MM3 (ref 0–0.05)
IMM GRANULOCYTES NFR BLD AUTO: 0.2 % (ref 0–0.5)
LYMPHOCYTES # BLD AUTO: 2.44 10*3/MM3 (ref 0.7–3.1)
LYMPHOCYTES NFR BLD AUTO: 26.4 % (ref 19.6–45.3)
MAGNESIUM SERPL-MCNC: 1.9 MG/DL (ref 1.6–2.3)
MCH RBC QN AUTO: 29.2 PG (ref 26.6–33)
MCHC RBC AUTO-ENTMCNC: 33.1 G/DL (ref 31.5–35.7)
MCV RBC AUTO: 88.2 FL (ref 79–97)
MONOCYTES # BLD AUTO: 0.84 10*3/MM3 (ref 0.1–0.9)
MONOCYTES NFR BLD AUTO: 9.1 % (ref 5–12)
NEUTROPHILS # BLD AUTO: 5.78 10*3/MM3 (ref 1.7–7)
NEUTROPHILS NFR BLD AUTO: 62.5 % (ref 42.7–76)
NRBC BLD AUTO-RTO: 0 /100 WBC (ref 0–0.2)
NT-PROBNP SERPL-MCNC: 46.5 PG/ML (ref 0–125)
PLATELET # BLD AUTO: 228 10*3/MM3 (ref 140–450)
PMV BLD AUTO: 10.8 FL (ref 6–12)
POTASSIUM BLD-SCNC: 3.6 MMOL/L (ref 3.5–5.1)
PROT SERPL-MCNC: 8.2 G/DL (ref 6.3–8.2)
RBC # BLD AUTO: 4.9 10*6/MM3 (ref 3.77–5.28)
SODIUM BLD-SCNC: 138 MMOL/L (ref 137–145)
TROPONIN I SERPL-MCNC: <0.012 NG/ML (ref 0–0.03)
TSH SERPL DL<=0.05 MIU/L-ACNC: 1.44 MIU/ML (ref 0.47–4.68)
WBC NRBC COR # BLD: 9.25 10*3/MM3 (ref 3.4–10.8)
WHOLE BLOOD HOLD SPECIMEN: NORMAL
WHOLE BLOOD HOLD SPECIMEN: NORMAL

## 2019-07-10 PROCEDURE — 85379 FIBRIN DEGRADATION QUANT: CPT | Performed by: EMERGENCY MEDICINE

## 2019-07-10 PROCEDURE — 25010000002 ENOXAPARIN PER 10 MG: Performed by: EMERGENCY MEDICINE

## 2019-07-10 PROCEDURE — 80053 COMPREHEN METABOLIC PANEL: CPT | Performed by: EMERGENCY MEDICINE

## 2019-07-10 PROCEDURE — 99222 1ST HOSP IP/OBS MODERATE 55: CPT | Performed by: INTERNAL MEDICINE

## 2019-07-10 PROCEDURE — 85025 COMPLETE CBC W/AUTO DIFF WBC: CPT | Performed by: EMERGENCY MEDICINE

## 2019-07-10 PROCEDURE — 93005 ELECTROCARDIOGRAM TRACING: CPT

## 2019-07-10 PROCEDURE — 71045 X-RAY EXAM CHEST 1 VIEW: CPT

## 2019-07-10 PROCEDURE — 83735 ASSAY OF MAGNESIUM: CPT | Performed by: EMERGENCY MEDICINE

## 2019-07-10 PROCEDURE — 83880 ASSAY OF NATRIURETIC PEPTIDE: CPT | Performed by: EMERGENCY MEDICINE

## 2019-07-10 PROCEDURE — 84443 ASSAY THYROID STIM HORMONE: CPT | Performed by: EMERGENCY MEDICINE

## 2019-07-10 PROCEDURE — 25010000002 DIGOXIN PER 500 MCG: Performed by: INTERNAL MEDICINE

## 2019-07-10 PROCEDURE — 84484 ASSAY OF TROPONIN QUANT: CPT | Performed by: EMERGENCY MEDICINE

## 2019-07-10 PROCEDURE — 99284 EMERGENCY DEPT VISIT MOD MDM: CPT

## 2019-07-10 RX ORDER — ACETAMINOPHEN 325 MG/1
650 TABLET ORAL EVERY 4 HOURS PRN
Status: DISCONTINUED | OUTPATIENT
Start: 2019-07-10 | End: 2019-07-12 | Stop reason: HOSPADM

## 2019-07-10 RX ORDER — ALUMINA, MAGNESIA, AND SIMETHICONE 2400; 2400; 240 MG/30ML; MG/30ML; MG/30ML
15 SUSPENSION ORAL EVERY 6 HOURS PRN
Status: DISCONTINUED | OUTPATIENT
Start: 2019-07-10 | End: 2019-07-12 | Stop reason: HOSPADM

## 2019-07-10 RX ORDER — SODIUM CHLORIDE 0.9 % (FLUSH) 0.9 %
3-10 SYRINGE (ML) INJECTION AS NEEDED
Status: DISCONTINUED | OUTPATIENT
Start: 2019-07-10 | End: 2019-07-12 | Stop reason: HOSPADM

## 2019-07-10 RX ORDER — DILTIAZEM HYDROCHLORIDE 5 MG/ML
10 INJECTION INTRAVENOUS ONCE
Status: COMPLETED | OUTPATIENT
Start: 2019-07-10 | End: 2019-07-10

## 2019-07-10 RX ORDER — ERGOCALCIFEROL (VITAMIN D2) 10 MCG
400 TABLET ORAL DAILY
Status: ON HOLD | COMMUNITY
End: 2021-01-18

## 2019-07-10 RX ORDER — SODIUM CHLORIDE 0.9 % (FLUSH) 0.9 %
3 SYRINGE (ML) INJECTION EVERY 12 HOURS SCHEDULED
Status: DISCONTINUED | OUTPATIENT
Start: 2019-07-10 | End: 2019-07-12 | Stop reason: HOSPADM

## 2019-07-10 RX ORDER — BISACODYL 5 MG/1
5 TABLET, DELAYED RELEASE ORAL DAILY PRN
Status: DISCONTINUED | OUTPATIENT
Start: 2019-07-10 | End: 2019-07-12 | Stop reason: HOSPADM

## 2019-07-10 RX ORDER — METOPROLOL TARTRATE 5 MG/5ML
5 INJECTION INTRAVENOUS
Status: DISCONTINUED | OUTPATIENT
Start: 2019-07-10 | End: 2019-07-10

## 2019-07-10 RX ORDER — SODIUM CHLORIDE 0.9 % (FLUSH) 0.9 %
10 SYRINGE (ML) INJECTION AS NEEDED
Status: DISCONTINUED | OUTPATIENT
Start: 2019-07-10 | End: 2019-07-12 | Stop reason: HOSPADM

## 2019-07-10 RX ORDER — DIGOXIN 0.25 MG/ML
250 INJECTION INTRAMUSCULAR; INTRAVENOUS ONCE
Status: COMPLETED | OUTPATIENT
Start: 2019-07-10 | End: 2019-07-10

## 2019-07-10 RX ORDER — ONDANSETRON 4 MG/1
4 TABLET, ORALLY DISINTEGRATING ORAL EVERY 6 HOURS PRN
Status: DISCONTINUED | OUTPATIENT
Start: 2019-07-10 | End: 2019-07-12 | Stop reason: HOSPADM

## 2019-07-10 RX ADMIN — ENOXAPARIN SODIUM 70 MG: 80 INJECTION SUBCUTANEOUS at 22:16

## 2019-07-10 RX ADMIN — METOPROLOL TARTRATE 5 MG: 1 INJECTION, SOLUTION INTRAVENOUS at 20:52

## 2019-07-10 RX ADMIN — SODIUM CHLORIDE 1000 ML: 9 INJECTION, SOLUTION INTRAVENOUS at 20:55

## 2019-07-10 RX ADMIN — DILTIAZEM HYDROCHLORIDE 10 MG: 5 INJECTION INTRAVENOUS at 21:18

## 2019-07-10 RX ADMIN — DILTIAZEM HYDROCHLORIDE 5 MG/HR: 5 INJECTION INTRAVENOUS at 21:21

## 2019-07-10 RX ADMIN — SODIUM CHLORIDE 500 ML: 9 INJECTION, SOLUTION INTRAVENOUS at 23:15

## 2019-07-10 RX ADMIN — METOPROLOL TARTRATE 5 MG: 1 INJECTION, SOLUTION INTRAVENOUS at 21:01

## 2019-07-10 RX ADMIN — METOPROLOL TARTRATE 5 MG: 1 INJECTION, SOLUTION INTRAVENOUS at 20:56

## 2019-07-10 RX ADMIN — DIGOXIN 250 MCG: 0.25 INJECTION INTRAMUSCULAR; INTRAVENOUS at 23:15

## 2019-07-11 ENCOUNTER — APPOINTMENT (OUTPATIENT)
Dept: CARDIOLOGY | Facility: HOSPITAL | Age: 54
End: 2019-07-11

## 2019-07-11 PROBLEM — I48.0 PAROXYSMAL ATRIAL FIBRILLATION WITH RVR (HCC): Status: ACTIVE | Noted: 2019-07-11

## 2019-07-11 PROBLEM — I48.91 ATRIAL FIBRILLATION WITH RVR: Status: RESOLVED | Noted: 2019-07-10 | Resolved: 2019-07-11

## 2019-07-11 LAB
ANION GAP SERPL CALCULATED.3IONS-SCNC: 9.7 MMOL/L (ref 10–20)
BASOPHILS # BLD AUTO: 0.02 10*3/MM3 (ref 0–0.2)
BASOPHILS NFR BLD AUTO: 0.3 % (ref 0–1.5)
BUN BLD-MCNC: 12 MG/DL (ref 7–20)
BUN/CREAT SERPL: 17.1 (ref 7.1–23.5)
CALCIUM SPEC-SCNC: 8.8 MG/DL (ref 8.4–10.2)
CHLORIDE SERPL-SCNC: 106 MMOL/L (ref 98–107)
CHOLEST SERPL-MCNC: 246 MG/DL (ref 0–199)
CO2 SERPL-SCNC: 28 MMOL/L (ref 26–30)
CREAT BLD-MCNC: 0.7 MG/DL (ref 0.6–1.3)
DEPRECATED RDW RBC AUTO: 40.6 FL (ref 37–54)
EOSINOPHIL # BLD AUTO: 0.13 10*3/MM3 (ref 0–0.4)
EOSINOPHIL NFR BLD AUTO: 2.1 % (ref 0.3–6.2)
ERYTHROCYTE [DISTWIDTH] IN BLOOD BY AUTOMATED COUNT: 12.4 % (ref 12.3–15.4)
GFR SERPL CREATININE-BSD FRML MDRD: 88 ML/MIN/1.73
GLUCOSE BLD-MCNC: 91 MG/DL (ref 74–98)
HBA1C MFR BLD: 5.7 % (ref 3–6)
HCT VFR BLD AUTO: 38.9 % (ref 34–46.6)
HDLC SERPL-MCNC: 72 MG/DL (ref 40–60)
HGB BLD-MCNC: 12.8 G/DL (ref 12–15.9)
HIGH SENSITIVE C-REACTIVE PROTEIN MG/L: 0.43 MG/L (ref 1–3)
IMM GRANULOCYTES # BLD AUTO: 0.01 10*3/MM3 (ref 0–0.05)
IMM GRANULOCYTES NFR BLD AUTO: 0.2 % (ref 0–0.5)
LDLC SERPL CALC-MCNC: 155 MG/DL (ref 0–99)
LDLC/HDLC SERPL: 2.15 {RATIO}
LYMPHOCYTES # BLD AUTO: 2.22 10*3/MM3 (ref 0.7–3.1)
LYMPHOCYTES NFR BLD AUTO: 36.5 % (ref 19.6–45.3)
MAXIMAL PREDICTED HEART RATE: 167 BPM
MCH RBC QN AUTO: 29.4 PG (ref 26.6–33)
MCHC RBC AUTO-ENTMCNC: 32.9 G/DL (ref 31.5–35.7)
MCV RBC AUTO: 89.2 FL (ref 79–97)
MONOCYTES # BLD AUTO: 0.56 10*3/MM3 (ref 0.1–0.9)
MONOCYTES NFR BLD AUTO: 9.2 % (ref 5–12)
NEUTROPHILS # BLD AUTO: 3.15 10*3/MM3 (ref 1.7–7)
NEUTROPHILS NFR BLD AUTO: 51.7 % (ref 42.7–76)
NRBC BLD AUTO-RTO: 0 /100 WBC (ref 0–0.2)
PLATELET # BLD AUTO: 207 10*3/MM3 (ref 140–450)
PMV BLD AUTO: 11.3 FL (ref 6–12)
POTASSIUM BLD-SCNC: 3.7 MMOL/L (ref 3.5–5.1)
RBC # BLD AUTO: 4.36 10*6/MM3 (ref 3.77–5.28)
SODIUM BLD-SCNC: 140 MMOL/L (ref 137–145)
STRESS TARGET HR: 142 BPM
T4 FREE SERPL-MCNC: 0.92 NG/DL (ref 0.78–2.19)
TRIGL SERPL-MCNC: 96 MG/DL
TROPONIN I SERPL-MCNC: <0.012 NG/ML (ref 0–0.03)
TROPONIN I SERPL-MCNC: <0.012 NG/ML (ref 0–0.03)
TSH SERPL DL<=0.05 MIU/L-ACNC: 1.89 MIU/ML (ref 0.47–4.68)
VLDLC SERPL-MCNC: 19.2 MG/DL
WBC NRBC COR # BLD: 6.09 10*3/MM3 (ref 3.4–10.8)

## 2019-07-11 PROCEDURE — 84484 ASSAY OF TROPONIN QUANT: CPT | Performed by: INTERNAL MEDICINE

## 2019-07-11 PROCEDURE — 84439 ASSAY OF FREE THYROXINE: CPT | Performed by: INTERNAL MEDICINE

## 2019-07-11 PROCEDURE — 92960 CARDIOVERSION ELECTRIC EXT: CPT | Performed by: INTERNAL MEDICINE

## 2019-07-11 PROCEDURE — 25010000002 DIGOXIN PER 500 MCG: Performed by: INTERNAL MEDICINE

## 2019-07-11 PROCEDURE — 84443 ASSAY THYROID STIM HORMONE: CPT | Performed by: INTERNAL MEDICINE

## 2019-07-11 PROCEDURE — 25010000002 ENOXAPARIN PER 10 MG: Performed by: INTERNAL MEDICINE

## 2019-07-11 PROCEDURE — 86141 C-REACTIVE PROTEIN HS: CPT | Performed by: INTERNAL MEDICINE

## 2019-07-11 PROCEDURE — 80061 LIPID PANEL: CPT | Performed by: INTERNAL MEDICINE

## 2019-07-11 PROCEDURE — 85025 COMPLETE CBC W/AUTO DIFF WBC: CPT | Performed by: INTERNAL MEDICINE

## 2019-07-11 PROCEDURE — 93306 TTE W/DOPPLER COMPLETE: CPT

## 2019-07-11 PROCEDURE — 83036 HEMOGLOBIN GLYCOSYLATED A1C: CPT | Performed by: INTERNAL MEDICINE

## 2019-07-11 PROCEDURE — 84481 FREE ASSAY (FT-3): CPT | Performed by: INTERNAL MEDICINE

## 2019-07-11 PROCEDURE — 92960 CARDIOVERSION ELECTRIC EXT: CPT

## 2019-07-11 PROCEDURE — 80048 BASIC METABOLIC PNL TOTAL CA: CPT | Performed by: INTERNAL MEDICINE

## 2019-07-11 PROCEDURE — 93005 ELECTROCARDIOGRAM TRACING: CPT | Performed by: INTERNAL MEDICINE

## 2019-07-11 PROCEDURE — 99232 SBSQ HOSP IP/OBS MODERATE 35: CPT | Performed by: INTERNAL MEDICINE

## 2019-07-11 RX ORDER — METOPROLOL SUCCINATE 50 MG/1
50 TABLET, EXTENDED RELEASE ORAL
Status: DISCONTINUED | OUTPATIENT
Start: 2019-07-11 | End: 2019-07-12

## 2019-07-11 RX ORDER — DIGOXIN 125 MCG
250 TABLET ORAL ONCE
Status: COMPLETED | OUTPATIENT
Start: 2019-07-11 | End: 2019-07-11

## 2019-07-11 RX ORDER — DIGOXIN 0.25 MG/ML
250 INJECTION INTRAMUSCULAR; INTRAVENOUS ONCE
Status: COMPLETED | OUTPATIENT
Start: 2019-07-11 | End: 2019-07-11

## 2019-07-11 RX ADMIN — ENOXAPARIN SODIUM 70 MG: 80 INJECTION SUBCUTANEOUS at 10:57

## 2019-07-11 RX ADMIN — DIGOXIN 250 MCG: 125 TABLET ORAL at 02:21

## 2019-07-11 RX ADMIN — SODIUM CHLORIDE, PRESERVATIVE FREE 3 ML: 5 INJECTION INTRAVENOUS at 20:43

## 2019-07-11 RX ADMIN — DIGOXIN 250 MCG: 0.25 INJECTION INTRAMUSCULAR; INTRAVENOUS at 06:19

## 2019-07-11 RX ADMIN — SODIUM CHLORIDE, PRESERVATIVE FREE 3 ML: 5 INJECTION INTRAVENOUS at 08:09

## 2019-07-11 NOTE — PLAN OF CARE
Problem: Patient Care Overview  Goal: Plan of Care Review  Outcome: Ongoing (interventions implemented as appropriate)   07/11/19 1036   Plan of Care Review   Progress improving   OTHER   Outcome Summary Pt A&OX4. Patient did not have the procedure and now heart rhythm is Sinus daren. Pt stated having no pain. Cardizem drip d/c. Will continue to monitor   Coping/Psychosocial   Plan of Care Reviewed With patient;spouse       Problem: Arrhythmia/Dysrhythmia (Symptomatic) (Adult)  Goal: Signs and Symptoms of Listed Potential Problems Will be Absent, Minimized or Managed (Arrhythmia/Dysrhythmia)  Outcome: Ongoing (interventions implemented as appropriate)   07/11/19 1036   Goal/Outcome Evaluation   Problems Assessed (Arrhythmia/Dysrhythmia) all   Problems Present (Dysrhythmia) none

## 2019-07-11 NOTE — PROGRESS NOTES
[facility]    Name:  Diana Flores   Age:  53 y.o.  Sex:  female  :  1965  MRN:  [id number]   Visit Number:  [id number]  Admission Date:  7/10/2019  Date Of Service:  19  Primary Care Physician:  Deon Garcia MD     LOS: 1 day :  Patient Care Team:  Deon Garcia MD as PCP - General:    Chief Complaint:      Follow-up of atrial fibrillation and rapid ventricular rate.    Subjective / Interval History:     Ms. Flores is currently lying down on the bed and is comfortable at rest.  She denies any shortness of breath or chest pain.  She has history of osteopenia and mitral valve prolapse and was admitted last night with palpitations.  She was noted to have atrial fibrillation with rapid ventricular rate and was placed on Cardizem drip.  Fortunately, she has spontaneously converted back to sinus rhythm this morning.  She has been evaluated by Dr. Salguero and a 2D echocardiogram has been done.  She denies any prior history of atrial fibrillation.  She does not take any medications other than calcium and vitamin D supplements at home.  Previous physician documentation, laboratory and imaging data have been reviewed.    Review of Systems:     General ROS: Patient denies any fevers, chills or loss of consciousness.  Respiratory ROS: Denies cough or shortness of breath.  Cardiovascular ROS: History of palpitations.  Denies chest pain.  Gastrointestinal ROS: Denies nausea and vomiting. Denies any abdominal pain. No diarrhea.  Neurological ROS: Denies any focal weakness. No loss of consciousness. Denies any numbness.  Dermatological ROS: Denies any redness or pruritis.    Vital Signs:    Temp:  [97.6 °F (36.4 °C)-98 °F (36.7 °C)] 98 °F (36.7 °C)  Heart Rate:  [] 43  Resp:  [16-20] 20  BP: ()/() 118/62    Intake and output:    I/O last 3 completed shifts:  In: 625.6 [I.V.:625.6]  Out: -   I/O this shift:  In: 240 [P.O.:240]  Out: -      Physical Examination:    General Appearance:  Alert and cooperative, not in any acute distress.   Head:  Atraumatic and normocephalic, without obvious abnormality.   Eyes:          PERRLA, conjunctivae and sclerae normal, no Icterus. No pallor. Extraocular movements are within normal limits.   Neck: Supple, trachea midline, no thyromegaly, no carotid bruit.   Lungs:   Chest shape is normal. Breath sounds heard bilaterally equally.  No crackles or wheezing. No Pleural rub or bronchial breathing.   Heart:  Normal S1 and S2, no murmur, no gallop, no rub. No JVD   Abdomen:   Normal bowel sounds, no masses, no organomegaly. Soft, non-tender, non-distended, no guarding, no rebound tenderness.   Extremities: Moves all extremities well, no edema, no cyanosis, no clubbing.   Skin: No bleeding, bruising or rash.   Neurologic: Awake, alert and oriented times 3. Moves all 4 extremities equally.     Laboratory results:    Results from last 7 days   Lab Units 07/11/19  0532 07/10/19  2022   SODIUM mmol/L 140 138   POTASSIUM mmol/L 3.7 3.6   CHLORIDE mmol/L 106 103   CO2 mmol/L 28.0 28.0   BUN mg/dL 12 18   CREATININE mg/dL 0.70 0.70   CALCIUM mg/dL 8.8 9.6   BILIRUBIN mg/dL  --  0.4   ALK PHOS U/L  --  117   ALT (SGPT) U/L  --  36   AST (SGOT) U/L  --  34   GLUCOSE mg/dL 91 109*     Results from last 7 days   Lab Units 07/11/19  0532 07/10/19  2022   WBC 10*3/mm3 6.09 9.25   HEMOGLOBIN g/dL 12.8 14.3   HEMATOCRIT % 38.9 43.2   PLATELETS 10*3/mm3 207 228         Results from last 7 days   Lab Units 07/11/19  0847 07/11/19  0211 07/10/19  2022   TROPONIN I ng/mL <0.012 <0.012 <0.012           I have reviewed the patient's laboratory results.    Radiology results:    Imaging Results (last 24 hours)     Procedure Component Value Units Date/Time    XR Chest 1 View [[id number]] Collected:  07/11/19 0740     Updated:  07/11/19 0742    Narrative:       PROCEDURE: XR CHEST 1 VW-     HISTORY: afib; I48.91-Unspecified atrial  fibrillation     COMPARISON: None.     FINDINGS: The heart is normal in size. The mediastinum is unremarkable.  The lungs are clear. There is no pneumothorax.  There are no acute  osseous abnormalities.       Impression:       No acute cardiopulmonary process.     Continued followup is recommended.     This report was finalized on 7/11/2019 7:40 AM by Radha Singh M.D..        I have reviewed the patient's radiology reports.    Medication Review:     I have reviewed the patients active and prn medications.       Atrial fibrillation with RVR (CMS/HCC)    Assessment:    1.  Paroxysmal atrial fibrillation with rapid ventricular rate, present on admission.  2.  Osteopenia.  3.  Mitral valve prolapse.  4.  Hyperlipidemia.    Plan:    Ms. Flores is currently comfortable at rest and hemodynamically stable.  Fortunately, she has converted back into sinus rhythm.  She does however have sinus bradycardia at times.  Her Cardizem drip has been discontinued.  We will continue metoprolol at this time.  Her chads score is 1 and at this time I will hold off on anticoagulation.  She does have dyslipidemia and would benefit dietary modifications with outpatient follow-up.  She denies excessive caffeine use.  I have discussed the patient's condition with Dr. Salguero who did see her this morning.  A 2D echocardiogram has been done and the results are currently pending.  I have discussed the patient's condition and treatment plan with her  who is at the bedside.  She has been ruled out for acute coronary syndrome with negative troponins.  Hopefully, she should be able to go home tomorrow.    Jewel Suarez MD  07/11/19  2:22 PM    Dictated utilizing Dragon dictation.

## 2019-07-11 NOTE — ED NOTES
House Supervisor called about bed placement for admit. Patient will be going to room 304 tele. JIMBO Esquivel notified.      Zina Nava  07/10/19 4350

## 2019-07-11 NOTE — PAYOR COMM NOTE
"TO:AETNA  FROM:MISBAH MARCOS, RN PHONE [phone]  INPT NOTIFICATION AND CLINICALS DX CODE I48.91  [facility] [phone] TAX ID    Diana Morales (53 y.o. Female)     Date of Birth Social Security Number Address Home Phone MRN    1965  [address] [place], [place] [address] [phone] [id number]    [employer] Marital Status          Restoration        Admission Date Admission Type Admitting Provider Attending Provider Department, Room/Bed    7/10/19 Emergency Mickey Marroquin MD Pais, Roshan, MD [facility] MED SURG  3, 304/1    Discharge Date Discharge Disposition Discharge Destination                       Attending Provider:  Jewel Suarez MD    Allergies:  No Known Allergies    Isolation:  None   Infection:  None   Code Status:  CPR    Ht:  162.6 cm (64\")   Wt:  67.8 kg (149 lb 8 oz)    Admission Cmt:  None   Principal Problem:  None                Active Insurance as of 7/10/2019     Primary Coverage     Payor Plan Insurance Group Employer/Plan Group    [employer] - ASA [id number]     Payor Plan Address Payor Plan Phone Number Payor Plan Fax Number Effective Dates    [address]   2016 - None Entered    [facility] [id number]       Subscriber Name Subscriber Birth Date Member ID       JACKIE MORALES 1969 [id number]                 Emergency Contacts      (Rel.) Home Phone Work Phone Mobile Phone    Jackie Morales (Spouse) [phone] -- [phone]               History & Physical      Mickey Marroquin MD at 7/10/2019 10:02 PM              [facility] HOSPITALIST   HISTORY AND PHYSICAL      Name:  Diana Morales   Age:  53 y.o.  Sex:  female  :  1965  MRN:  [id number]   Visit Number:  [id number]  Admission Date:  7/10/2019  Date Of Service:  07/10/19  Primary Care Physician:  Deon Garcia MD    Chief Complaint:   Palpitations    History Of Presenting Illness:  "     53-year-old female relatively healthy except for mitral valve prolapse in the past.  She states she has had a history of palpitations but these are usually not persistent.  Presents the ER today with complaints of tachycardia, palpitations some associated chest pressure and shortness of breath, all resolved presently.  Presented in A. fib with RVR at 180, received metoprolol, and Cardizem drip with improvement.  Feels much better with no complaints presently, denies chest pain, chest pressure, shortness of breath.  Saturation 96% on room air.  Received Lovenox in ER as well.  Laboratory values reviewed.  Troponin negative, d-dimer negative.    Review Of Systems:  General: Patient denies any fevers, chills or loss of consciousness.   Psychological: Denies any hallucinations and delusions, no homicidal or suicidal ideations.  Ophthalmic: Denies any diplopia or transient loss of vision.  ENT: Denies sore throat, nasal congestion or ear pain.   Allergy and Immunology: Denies rash or itching.  Hematological and Lymphatic: Denies neck swelling or easy bleeding.  Endocrine: Denies any recent unintentional weight gain or loss.  Respiratory: Presently denies cough, shortness of breath, hemoptysis, or pleurisy.  Cardiovascular: Denies chest pain . No history of exertional chest pain.  Positive for palpitations on admission  Gastrointestinal: Denies nausea and vomiting. Denies any abdominal pain. No diarrhea.  Genito-Urinary: Denies dysuria or hematuria.  Neurological: Denies any focal weakness. No loss of consciousness. Denies any numbness. Denies neck pain. Denies visual changes.   Dermatological: Denies any redness or pruritis, or rash.    ROS otherwise reviewed in detail and felt to be noncontributing.     Past Medical History:    Past Medical History:   Diagnosis Date   • Arthritis    • Lower back pain    • MVP (mitral valve prolapse)    • Palpitations    • Snores    • Wears glasses        Past Surgical  history:    Past Surgical History:   Procedure Laterality Date   •  SECTION     • COLONOSCOPY N/A 2017    Procedure: COLONOSCOPY;  Surgeon: Michele Marion MD;  Location: [facility] ENDOSCOPY;  Service:    • KNEE ARTHROSCOPY Left 2017    Procedure: KNEE ARTHROSCOPY LEFT  WITH PARTIAL MEDIAL and  LATERAL  MENISCECTOMY;  Surgeon: Dusty Dangelo MD;  Location: [facility] OR;  Service:    • PARTIAL HYSTERECTOMY  2003   • TRIGGER FINGER RELEASE Right     3RD FINGER       Social History:    Social History     Socioeconomic History   • Marital status:      Spouse name: Not on file   • Number of children: Not on file   • Years of education: Not on file   • Highest education level: Not on file   Tobacco Use   • Smoking status: Never Smoker   • Smokeless tobacco: Never Used   Substance and Sexual Activity   • Alcohol use: Yes     Comment: SOCIAL   • Drug use: No   • Sexual activity: Defer       Family History:    Family History   Problem Relation Age of Onset   • Pancreatic cancer Mother    • Colon cancer Neg Hx    • Liver cancer Neg Hx    • Liver disease Neg Hx    • Stomach cancer Neg Hx    • Esophageal cancer Neg Hx        Allergies:      Patient has no known allergies.    Home Medications:    Prior to Admission Medications     Prescriptions Last Dose Informant Patient Reported? Taking?    ondansetron ODT (ZOFRAN-ODT) 4 MG disintegrating tablet   No No    Take 1 tablet by mouth Every 6 (Six) Hours As Needed for Nausea or Vomiting.             ED Medications:    Medications   sodium chloride 0.9 % flush 10 mL (not administered)   sodium chloride 0.9 % flush 10 mL (not administered)   metoprolol tartrate (LOPRESSOR) injection 5 mg (5 mg Intravenous Given 7/10/19 2101)   diltiaZEM (CARDIZEM) 125 mg in sodium chloride 0.9 % 125 mL (1 mg/mL) infusion (5 mg/hr Intravenous New Bag 7/10/19 2121)   enoxaparin (LOVENOX) syringe 70 mg (not administered)   sodium chloride 0.9 % bolus 1,000 mL (1,000 mL Intravenous  New Bag 7/10/19 2055)   diltiaZEM (CARDIZEM) injection 10 mg (10 mg Intravenous Given 7/10/19 2118)       Vital Signs:    Temp:  [97.6 °F (36.4 °C)] 97.6 °F (36.4 °C)  Heart Rate:  [] 80  Resp:  [18] 18  BP: ()/() 109/82        07/10/19  1955   Weight: 67.1 kg (148 lb)       Body mass index is 25.4 kg/m².    Physical Exam:    General Appearance:  Alert and cooperative, not in any acute distress.   Head:  Atraumatic and normocephalic, without obvious abnormality.   Eyes:          PERRLA, conjunctivae and sclerae normal, no Icterus. No pallor. Extra-occular movements are within normal limits.   Throat: No oral lesions, no thrush, oral mucosa moist.   Neck: Supple, trachea midline, no thyromegaly, no carotid bruit. No JVD.   Back:   No kyphoscoliosis present. No tenderness to palpation,   range of motion normal.   Lungs:   Chest shape is normal. Breath sounds heard bilaterally equally.  No crackles or wheezing. No Pleural rub or bronchial breathing. Otherwise lungs are clear.   Heart:  Normal S1 and S2, no murmur, no gallop, no rub. No JVD.  Irregularly irregular rhythm rate between 105 and 120.   Abdomen:   Normal bowel sounds, no masses, no organomegaly. Soft non-tender, non-distended, no guarding, no rebound tenderness.   Extremities: Moves all extremities well, no edema, no cyanosis, no clubbing.   Pulses: Pulses palpable and equal bilaterally.   Skin: No bleeding, bruising or rash.   Neurologic: Alert and oriented x 3. Moves all four limbs equally. No tremors. No facial asymetry. Cranial nerves 2-12 intact. Musculosensory exam intact.   Psychiatric: Mood and affect normal. Denies homicidal or suicidal ideations.     Laboratory data:    I have reviewed the labs done in the emergency room.    Results from last 7 days   Lab Units 07/10/19  2022   SODIUM mmol/L 138   POTASSIUM mmol/L 3.6   CHLORIDE mmol/L 103   CO2 mmol/L 28.0   BUN mg/dL 18   CREATININE mg/dL 0.70   CALCIUM mg/dL 9.6   BILIRUBIN  mg/dL 0.4   ALK PHOS U/L 117   ALT (SGPT) U/L 36   AST (SGOT) U/L 34   GLUCOSE mg/dL 109*     Results from last 7 days   Lab Units 07/10/19  2022   WBC 10*3/mm3 9.25   HEMOGLOBIN g/dL 14.3   HEMATOCRIT % 43.2   PLATELETS 10*3/mm3 228         Results from last 7 days   Lab Units 07/10/19  2022   TROPONIN I ng/mL <0.012     Results from last 7 days   Lab Units 07/10/19  2022   PROBNP pg/mL 46.5                       Invalid input(s): USDES,  BLOODU, NITRITITE, BACT, EP  Pain Management Panel     There is no flowsheet data to display.              EKG:      A. fib otherwise no acute changes    Radiology:    Imaging Results (last 72 hours)     ** No results found for the last 72 hours. **          Assessment:    1.  New onset A. Fib  2.  History of mitral valve prolapse    Plan:     Serial troponins, Dr. Salguero consult.  Continue IV Cardizem.  Continue Lovenox per pharmacy.  Digoxin 0.25 IV x1.  Cardiac 2D echo. Check TSH, T3 and T4.  ###Chest x-ray report is pending, chest x-ray report as per hospitalist team taking over service in a.m. ####      Mickey Marroquin MD  07/10/19  10:02 PM    Portions of this note may have been completed with Dragon, a voice recognition program. Not all errors in transcription may have been detected prior to signing.    Electronically signed by Mickey Marroquin MD at 7/10/2019 10:19 PM          Emergency Department Notes      Omar Underwood,  at 7/10/2019  8:50 PM          Subjective   53-year-old female presents to the ED with a chief complaint of palpitations.  The patient indicates that she was outside getting ready to grill when she felt like her heart started racing.  She states that she has had some episodes of palpitations previously but none have lasted this long.  She states that the started a proximally 1 hour ago and have been consistent.  She states that she has mild shortness of breath associated with the rapid heart rate.  She denies fever chills.  No chest pain.   No nausea vomiting diarrhea or abdominal pain.  No recent illnesses.  No other complaints at this time.  No history of regular heart rhythm or A. fib.            Review of Systems   Constitutional: Negative for fatigue and fever.   Respiratory: Positive for shortness of breath.    Cardiovascular: Positive for palpitations. Negative for chest pain and leg swelling.   All other systems reviewed and are negative.      Past Medical History:   Diagnosis Date   • Arthritis    • Lower back pain    • MVP (mitral valve prolapse)    • Palpitations    • Snores    • Wears glasses        No Known Allergies    Past Surgical History:   Procedure Laterality Date   •  SECTION     • COLONOSCOPY N/A 2017    Procedure: COLONOSCOPY;  Surgeon: Michele Marion MD;  Location: [facility] ENDOSCOPY;  Service:    • KNEE ARTHROSCOPY Left 2017    Procedure: KNEE ARTHROSCOPY LEFT  WITH PARTIAL MEDIAL and  LATERAL  MENISCECTOMY;  Surgeon: Dusty Dangelo MD;  Location: [facility] OR;  Service:    • PARTIAL HYSTERECTOMY  2003   • TRIGGER FINGER RELEASE Right     3RD FINGER       Family History   Problem Relation Age of Onset   • Pancreatic cancer Mother    • Colon cancer Neg Hx    • Liver cancer Neg Hx    • Liver disease Neg Hx    • Stomach cancer Neg Hx    • Esophageal cancer Neg Hx        Social History     Socioeconomic History   • Marital status:      Spouse name: Not on file   • Number of children: Not on file   • Years of education: Not on file   • Highest education level: Not on file   Tobacco Use   • Smoking status: Never Smoker   • Smokeless tobacco: Never Used   Substance and Sexual Activity   • Alcohol use: Yes     Comment: SOCIAL   • Drug use: No   • Sexual activity: Defer           Objective   Physical Exam   Constitutional: She is oriented to person, place, and time. She appears well-developed and well-nourished. No distress.   HENT:   Head: Normocephalic and atraumatic.   Nose: Nose normal.   Eyes: Conjunctivae  and EOM are normal.   Cardiovascular: Intact distal pulses.   No murmur heard.  Tachycardic.  Irregular rhythm.  Atrial fibrillation with RVR on the monitor.   Pulmonary/Chest: Effort normal and breath sounds normal. No respiratory distress. She has no wheezes.   Abdominal: Soft. She exhibits no distension. There is no tenderness. There is no guarding.   Musculoskeletal: She exhibits no edema or deformity.   Neurological: She is alert and oriented to person, place, and time. No cranial nerve deficit.   Skin: She is not diaphoretic.   Nursing note and vitals reviewed.      Procedures          ED Course  ED Course as of Jul 11 0453   Wed Jul 10, 2019   2217 Initial EKG interpreted by me.  Atrial fibrillation with rapid ventricular response.  Rate of 160.  Nonspecific ST segment depression.  Abnormal EKG.  [CG]   2218 Repeat EKG interpreted by me.  Atrial fibrillation.  Rate of 90.  Frequent PVCs.  No ST segment depression or elevation.  No T wave abnormalities.  Abnormal EKG  [CG]      ED Course User Index  [CG] Omar Underwood DO                  [facility]  Patient presented to the ED in A. fib with RVR.  Unknown initial onset.  States she has had some prior episodes of palpitations before which normally resolved.  Initial EKG showed a heart rate greater than 140.  Multiple doses of metoprolol were given to try and convert the patient but they were unsuccessful.  Patient was started on Cardizem drip which did control her heart rate but not her rhythm.  She will be given Lovenox and admitted to the hospital for further evaluation and medical management.    Final diagnoses:   Atrial fibrillation with RVR (CMS/[facility])            Omar Underwood DO  07/11/19 0453      Electronically signed by Omar Underwood DO at 7/11/2019  4:53 AM     Zina Nava at 7/10/2019  9:44 PM        Dr. Dickerson called for Dr. Underwood with answer.      Zina Nava  07/10/19 7183      Electronically signed by Zina Nava at  7/10/2019  9:45 PM     Zina Nava at 7/10/2019  9:57 PM        House Supervisor called about bed placement for admit. Patient will be going to room [facility] tele. JIMBO Esquivel notified.      Zina Nava  07/10/19 2158      Electronically signed by Zina Nava at 7/10/2019  9:58 PM       ICU Vital Signs     Row Name 07/11/19 0833 07/11/19 0800 07/11/19 0759 07/11/19 07:49:31 07/11/19 07:46:25       Vitals    Pulse  45  (Abnormal)   --  53  55  50    Heart Rate Source  Monitor  --  Monitor  --  --    Resp  18  --  18  20  20    Resp Rate Source  Visual  --  Visual  --  --    BP  103/61  --  113/71  111/68  115/61    BP Location  Left leg  --  Left arm  --  --    BP Method  Automatic  --  Automatic  --  --    Patient Position  Lying  --  Lying  --  --       Oxygen Therapy    SpO2  --  --  97 %  100 %  100 %    Pulse Oximetry Type  Continuous  --  Continuous  --  --    Device (Oxygen Therapy)  room air  room air  room air  --  --    Row Name 07/11/19 07:42:54 07/11/19 0711 07/11/19 0600 07/11/19 0535 07/11/19 0502       Height and Weight    Weight  --  --  --  --  67.8 kg (149 lb 8 oz)    Weight Method  --  --  --  --  Bed scale       Vitals    Temp  --  97.9 °F (36.6 °C)  --  --  --    Temp src  --  Oral  --  --  --    Pulse  110  --  --  87  110    Heart Rate Source  --  Monitor  --  --  --    Resp  20  16  --  --  --    Resp Rate Source  --  Visual  --  --  --    BP  122/65  106/65  --  108/69  91/67    Noninvasive MAP (mmHg)  --  --  --  87  78    BP Location  --  Left arm  --  --  --    BP Method  --  Automatic  --  --  --    Patient Position  --  Lying  --  --  --       Oxygen Therapy    SpO2  100 %  --  --  96 %  97 %    Pulse Oximetry Type  --  Continuous  --  --  --    Device (Oxygen Therapy)  --  room air  room air  --  --    Row Name 07/11/19 0432 07/11/19 0405 07/11/19 0400 07/11/19 0333 07/11/19 0301       Vitals    Pulse  81  116  --  83  84    BP  95/72  105/78  --  88/69  (Abnormal)    "99/78    Noninvasive MAP (mmHg)  80  86  --  75  83       Oxygen Therapy    SpO2  98 %  98 %  --  98 %  98 %    Device (Oxygen Therapy)  --  --  room air  --  --    Row Name 07/11/19 0232 07/11/19 0222 07/11/19 0221 07/11/19 0203 07/11/19 0200       Height and Weight    Height  --  --  --  162.6 cm (64\")  --    Weight  --  --  --  68 kg (150 lb)  --    Ideal Body Weight (IBW) (kg)  --  --  --  55  --    BSA (Calculated - sq m)  --  --  --  1.73 sq meters  --    BMI (Calculated)  --  --  --  25.7  --    Weight in (lb) to have BMI = 25  --  --  --  145.3  --       Vitals    Pulse  81  85  85  102  --    BP  95/67  100/55  100/55  87/69  (Abnormal)   --    Noninvasive MAP (mmHg)  73  73  --  75  --       Oxygen Therapy    SpO2  97 %  97 %  --  97 %  --    Device (Oxygen Therapy)  --  --  --  --  room air    Row Name 07/11/19 0132 07/11/19 0102 07/11/19 0032 07/11/19 0003 07/11/19 0000       Vitals    Pulse  95  95  97  102  --    BP  88/54  (Abnormal)   94/71  103/62  104/66  --    Noninvasive MAP (mmHg)  67  78  81  83  --       Oxygen Therapy    SpO2  96 %  97 %  98 %  97 %  --    Device (Oxygen Therapy)  --  --  --  --  room air    Row Name 07/10/19 2330 07/10/19 2302 07/10/19 2233 07/10/19 2230 07/10/19 2207       Vitals    Pulse  114  113  106  --  120    BP  102/84  94/79  114/68  --  --    Noninvasive MAP (mmHg)  88  83  87  --  --       Oxygen Therapy    SpO2  --  97 %  97 %  --  96 %    Device (Oxygen Therapy)  --  --  --  room air  --    Row Name 07/10/19 2206 07/10/19 2205 07/10/19 2204 07/10/19 2203 07/10/19 2202       Vitals    Pulse  118  122  (Abnormal)   120  133  (Abnormal)   133  (Abnormal)     BP  --  --  105/95  --  --    Noninvasive MAP (mmHg)  --  --  99  --  --       Oxygen Therapy    SpO2  96 %  96 %  94 %  97 %  96 %    Row Name 07/10/19 2201 07/10/19 2159 07/10/19 2157 07/10/19 2156 07/10/19 2155       Vitals    Pulse  112  141  (Abnormal)   128  (Abnormal)   138  (Abnormal)   89       Oxygen " Therapy    SpO2  95 %  98 %  96 %  97 %  96 %    [facility] Name 07/10/19 2154 07/10/19 2153 07/10/19 2152 07/10/19 2151 07/10/19 2150       Vitals    Pulse  120  105  94  121  (Abnormal)   101       Oxygen Therapy    SpO2  90 %  97 %  98 %  95 %  98 %    Row Name 07/10/19 2149 07/10/19 2148 07/10/19 2147 07/10/19 2146 07/10/19 2145       Vitals    Pulse  109  120  109  112  126  (Abnormal)        Oxygen Therapy    SpO2  99 %  98 %  97 %  97 %  98 %    Row Name 07/10/19 2144 07/10/19 2142 07/10/19 2141 07/10/19 2140 07/10/19 2139       Vitals    Pulse  118  116  111  97  102    BP  --  122/112  (Abnormal)   --  --  --    Noninvasive MAP (mmHg)  --  115  --  --  --       Oxygen Therapy    SpO2  96 %  --  96 %  98 %  100 %    Row Name 07/10/19 2138 07/10/19 2137 07/10/19 2136 07/10/19 2135 07/10/19 2134       Vitals    Pulse  109  98  87  88  82       Oxygen Therapy    SpO2  99 %  99 %  97 %  98 %  98 %    Row Name 07/10/19 2133 07/10/19 2132 07/10/19 2131 07/10/19 2130 07/10/19 2129       Vitals    Pulse  80  80  81  80  73       Oxygen Therapy    SpO2  96 %  98 %  97 %  97 %  98 %    Row Name 07/10/19 2128 07/10/19 2127 07/10/19 2126 07/10/19 2125 07/10/19 2124       Vitals    Pulse  80  74  75  78  85    BP  --  --  --  98/63  --    Noninvasive MAP (mmHg)  --  --  --  77  --       Oxygen Therapy    SpO2  98 %  98 %  97 %  96 %  97 %    Row Name 07/10/19 2123 07/10/19 2122 07/10/19 2121 07/10/19 2120 07/10/19 2119       Vitals    Pulse  64  92  137  (Abnormal)   141  (Abnormal)   149  (Abnormal)     BP  --  --  109/82  --  --    Noninvasive MAP (mmHg)  --  --  95  --  --       Oxygen Therapy    SpO2  96 %  98 %  99 %  99 %  99 %    Row Name 07/10/19 2118 07/10/19 2117 07/10/19 2116 07/10/19 2115 07/10/19 2114       Vitals    Pulse  142  (Abnormal)   144  (Abnormal)   138  (Abnormal)   140  (Abnormal)   147  (Abnormal)     BP  109/82  116/104  (Abnormal)   --  --  --    Noninvasive MAP (mmHg)  --  109  --  --  --        Oxygen Therapy    SpO2  97 %  99 %  97 %  96 %  98 %    [facility] Name 07/10/19 2113 07/10/19 2112 07/10/19 2111 07/10/19 2104 07/10/19 2103       Vitals    Pulse  142  (Abnormal)   149  (Abnormal)   141  (Abnormal)   144  (Abnormal)   142  (Abnormal)     BP  --  91/79  --  --  --    Noninvasive MAP (mmHg)  --  87  --  --  --       Oxygen Therapy    SpO2  98 %  95 %  99 %  96 %  98 %    Row Name 07/10/19 2102 07/10/19 2101 07/10/19 2100 07/10/19 2059 07/10/19 2058       Vitals    Pulse  149  (Abnormal)   135  (Abnormal)   135  (Abnormal)   140  (Abnormal)   151  (Abnormal)     BP  127/95  127/95  --  --  --    Noninvasive MAP (mmHg)  107  --  --  --  --       Oxygen Therapy    SpO2  97 %  96 %  98 %  95 %  97 %    Row Name 07/10/19 2057 07/10/19 2056 07/10/19 2055 07/10/19 2054 07/10/19 2053       Vitals    Pulse  151  (Abnormal)   149  (Abnormal)   152  (Abnormal)   147  (Abnormal)   152  (Abnormal)     BP  103/82  103/82  --  98/52  --    Noninvasive MAP (mmHg)  87  --  --  65  --       Oxygen Therapy    SpO2  98 %  96 %  97 %  98 %  98 %    Row Name 07/10/19 2052 07/10/19 2051 07/10/19 2050 07/10/19 2049 07/10/19 2048       Vitals    Pulse  156  (Abnormal)   160  (Abnormal)   154  (Abnormal)   168  (Abnormal)   163  (Abnormal)     BP  112/91  --  --  --  --       Oxygen Therapy    SpO2  98 %  96 %  97 %  98 %  97 %    Row Name 07/10/19 2047 07/10/19 2046 07/10/19 2045 07/10/19 2044 07/10/19 2043       Vitals    Pulse  172  (Abnormal)   152  (Abnormal)   172  (Abnormal)   165  (Abnormal)   160  (Abnormal)        Oxygen Therapy    SpO2  96 %  97 %  94 %  96 %  95 %    Row Name 07/10/19 2042 07/10/19 2026 07/10/19 2025 07/10/19 2024 07/10/19 2023       Vitals    Pulse  152  (Abnormal)   158  (Abnormal)   163  (Abnormal)   161  (Abnormal)   184  (Abnormal)     BP  112/91  --  --  --  --    Noninvasive MAP (mmHg)  102  --  --  --  --       Oxygen Therapy    SpO2  99 %  97 %  96 %  97 %  96 %    Row Name 07/10/19 2022  "07/10/19 2021 07/10/19 1955             Height and Weight    Height  --  --  162.6 cm (64\")      Height Method  --  --  Stated      Weight  --  --  67.1 kg (148 lb)      Weight Method  --  --  Standing scale      Ideal Body Weight (IBW) (kg)  --  --  55      BSA (Calculated - sq m)  --  --  1.72 sq meters      BMI (Calculated)  --  --  25.4      Weight in (lb) to have BMI = 25  --  --  145.3         Vitals    Temp  --  --  97.6 °F (36.4 °C)      Temp src  --  --  Oral      Pulse  168  (Abnormal)   176  (Abnormal)   135  (Abnormal)       Heart Rate Source  --  --  Monitor      Resp  --  --  18      Resp Rate Source  --  --  Visual      BP  105/78  112/90  113/96      Noninvasive MAP (mmHg)  93  99  --      BP Location  --  --  Right arm      BP Method  --  --  Automatic      Patient Position  --  --  Sitting         Oxygen Therapy    SpO2  96 %  97 %  98 %      Pulse Oximetry Type  --  --  Continuous      Device (Oxygen Therapy)  --  --  room air          Hospital Medications (active)       Dose Frequency Start End    acetaminophen (TYLENOL) tablet 650 mg 650 mg Every 4 Hours PRN 7/10/2019     Sig - Route: Take 2 tablets by mouth Every 4 (Four) Hours As Needed for Mild Pain . - Oral    aluminum-magnesium hydroxide-simethicone (MAALOX MAX) 400-400-40 MG/5ML suspension 15 mL 15 mL Every 6 Hours PRN 7/10/2019     Sig - Route: Take 15 mL by mouth Every 6 (Six) Hours As Needed for Heartburn. - Oral    bisacodyl (DULCOLAX) EC tablet 5 mg 5 mg Daily PRN 7/10/2019     Sig - Route: Take 1 tablet by mouth Daily As Needed for Constipation. - Oral    digoxin (LANOXIN) injection 250 mcg 250 mcg Once 7/10/2019 7/10/2019    Sig - Route: Infuse 1 mL into a venous catheter 1 (One) Time. - Intravenous    digoxin (LANOXIN) injection 250 mcg 250 mcg Once 7/11/2019 7/11/2019    Sig - Route: Infuse 1 mL into a venous catheter 1 (One) Time. - Intravenous    digoxin (LANOXIN) tablet 250 mcg 250 mcg Once 7/11/2019 7/11/2019    Sig - Route: " "Take 2 tablets by mouth 1 (One) Time. - Oral    diltiaZEM (CARDIZEM) injection 10 mg 10 mg Once 7/10/2019 7/10/2019    Sig - Route: Infuse 2 mL into a venous catheter 1 (One) Time. - Intravenous    enoxaparin (LOVENOX) syringe 70 mg 1 mg/kg × 67.1 kg Once 7/10/2019 7/10/2019    Sig - Route: Inject 0.7 mL under the skin into the appropriate area as directed 1 (One) Time. - Subcutaneous    enoxaparin (LOVENOX) syringe 70 mg 1 mg/kg × 67.1 kg Every 12 Hours 7/11/2019     Sig - Route: Inject 0.7 mL under the skin into the appropriate area as directed Every 12 (Twelve) Hours. - Subcutaneous    metoprolol succinate XL (TOPROL-XL) 24 hr tablet 50 mg 50 mg Every 24 Hours Scheduled 7/11/2019     Sig - Route: Take 1 tablet by mouth Daily. - Oral    ondansetron ODT (ZOFRAN-ODT) disintegrating tablet 4 mg 4 mg Every 6 Hours PRN 7/10/2019     Sig - Route: Take 1 tablet by mouth Every 6 (Six) Hours As Needed for Nausea or Vomiting. - Oral    Pharmacy to Dose enoxaparin (LOVENOX)  Continuous PRN 7/10/2019     Sig - Route: Continuous As Needed for Consult. - Does not apply    sodium chloride 0.9 % bolus 1,000 mL 1,000 mL Once 7/10/2019 7/10/2019    Sig - Route: Infuse 1,000 mL into a venous catheter 1 (One) Time. - Intravenous    sodium chloride 0.9 % bolus 500 mL 500 mL Once 7/11/2019 7/10/2019    Sig - Route: Infuse 500 mL into a venous catheter 1 (One) Time. - Intravenous    sodium chloride 0.9 % flush 10 mL 10 mL As Needed 7/10/2019     Sig - Route: Infuse 10 mL into a venous catheter As Needed for Line Care. - Intravenous    Cosign for Ordering: Required by Carson Lomax MD    sodium chloride 0.9 % flush 10 mL 10 mL As Needed 7/10/2019     Sig - Route: Infuse 10 mL into a venous catheter As Needed for Line Care. - Intravenous    Linked Group 1:  \"And\" Linked Group Details        sodium chloride 0.9 % flush 3 mL 3 mL Every 12 Hours Scheduled 7/10/2019     Sig - Route: Infuse 3 mL into a venous catheter Every 12 " (Twelve) Hours. - Intravenous    sodium chloride 0.9 % flush 3-10 mL 3-10 mL As Needed 7/10/2019     Sig - Route: Infuse 3-10 mL into a venous catheter As Needed for Line Care. - Intravenous    diltiaZEM (CARDIZEM) 125 mg in sodium chloride 0.9 % 125 mL (1 mg/mL) infusion (Discontinued) 5-15 mg/hr Titrated 7/10/2019 7/11/2019    Sig - Route: Infuse 5-15 mg/hr into a venous catheter Dose Adjusted By Provider As Needed. - Intravenous    metoprolol tartrate (LOPRESSOR) injection 5 mg (Discontinued) 5 mg Every 5 Minutes PRN 7/10/2019 7/10/2019    Sig - Route: Infuse 5 mL into a venous catheter Every 5 (Five) Minutes As Needed for Heart Rate (times 3). - Intravenous            Lab Results (last 24 hours)     Procedure Component Value Units Date/Time    Troponin [[id number]] Collected:  07/11/19 0847    Specimen:  Blood Updated:  07/11/19 0851    High Sensitivity CRP [[id number]] Collected:  07/11/19 0847    Specimen:  Blood Updated:  07/11/19 0851    TSH [[id number]]  (Normal) Collected:  07/11/19 0532    Specimen:  Blood Updated:  07/11/19 0728     TSH 1.890 mIU/mL     T4, Free [[id number]]  (Normal) Collected:  07/11/19 0532    Specimen:  Blood Updated:  07/11/19 0728     Free T4 0.92 ng/dL     Lipid Panel [[id number]]  (Abnormal) Collected:  07/11/19 0532    Specimen:  Blood Updated:  07/11/19 0722     Total Cholesterol 246 mg/dL      Triglycerides 96 mg/dL      HDL Cholesterol 72 mg/dL      LDL Cholesterol  155 mg/dL      VLDL Cholesterol 19.2 mg/dL      LDL/HDL Ratio 2.15    Narrative:       Reference ranges for triglycerides, VLDL cholesterol, LDL cholesterol, and HDL cholesterol are not true population normal ranges but are threshold levels for increased risk of coronary artery disease established by ATP III guidelines from the National Cholesterol Education Program.    Hemoglobin A1c [[id number]] Collected:  07/11/19 0532    Specimen:  Blood Updated:  07/11/19 0718    Basic Metabolic Panel [[id number]]  (Abnormal)  Collected:  07/11/19 0532    Specimen:  Blood Updated:  07/11/19 0635     Glucose 91 mg/dL      BUN 12 mg/dL      Creatinine 0.70 mg/dL      Sodium 140 mmol/L      Potassium 3.7 mmol/L      Chloride 106 mmol/L      CO2 28.0 mmol/L      Calcium 8.8 mg/dL      eGFR Non African Amer 88 mL/min/1.73      BUN/Creatinine Ratio 17.1     Anion Gap 9.7 mmol/L     Narrative:       GFR Normal >60  Chronic Kidney Disease <60  Kidney Failure <15    CBC & Differential [[id number]] Collected:  07/11/19 0532    Specimen:  Blood Updated:  07/11/19 0625    Narrative:       The following orders were created for panel order CBC & Differential.  Procedure                               Abnormality         Status                     ---------                               -----------         ------                     CBC Auto Differential[[id number]]        Normal              Final result                 Please view results for these tests on the individual orders.    CBC Auto Differential [[id number]]  (Normal) Collected:  07/11/19 0532    Specimen:  Blood Updated:  07/11/19 0625     WBC 6.09 10*3/mm3      RBC 4.36 10*6/mm3      Hemoglobin 12.8 g/dL      Hematocrit 38.9 %      MCV 89.2 fL      MCH 29.4 pg      MCHC 32.9 g/dL      RDW 12.4 %      RDW-SD 40.6 fl      MPV 11.3 fL      Platelets 207 10*3/mm3      Neutrophil % 51.7 %      Lymphocyte % 36.5 %      Monocyte % 9.2 %      Eosinophil % 2.1 %      Basophil % 0.3 %      Immature Grans % 0.2 %      Neutrophils, Absolute 3.15 10*3/mm3      Lymphocytes, Absolute 2.22 10*3/mm3      Monocytes, Absolute 0.56 10*3/mm3      Eosinophils, Absolute 0.13 10*3/mm3      Basophils, Absolute 0.02 10*3/mm3      Immature Grans, Absolute 0.01 10*3/mm3      nRBC 0.0 /100 WBC     T3, Free [[id number]] Collected:  07/11/19 0532    Specimen:  Blood Updated:  07/11/19 0613    Troponin [[id number]]  (Normal) Collected:  07/11/19 0211    Specimen:  Blood Updated:  07/11/19 0240     Troponin I <0.012 ng/mL      Narrative:       Normal Patient Upper Reference Limit (URL) (99th Percentile)=0.03 ng/mL   Non-AMI Illness Reference Limit=0.03-0.11 ng/mL   AMI Confirmation=0.12 ng/mL and above    [place] Draw [[id number]] Collected:  07/10/19 2022    Specimen:  Blood Updated:  07/10/19 2202    Narrative:       The following orders were created for panel order [place] Draw.  Procedure                               Abnormality         Status                     ---------                               -----------         ------                     Light Blue Top[[id number]]                                   Final result               Green Top (Gel)[[id number]]                                  Final result               Lavender Top[[id number]]                                     Final result               Gold Top - SST[[id number]]                                   Final result               Green Top (No Gel)[[id number]]                               Final result                 Please view results for these tests on the individual orders.    Light Blue Top [[id number]] Collected:  07/10/19 2022    Specimen:  Blood Updated:  07/10/19 2202     Extra Tube hold for add-on     Comment: Auto resulted       Lavender Top [[id number]] Collected:  07/10/19 2022    Specimen:  Blood Updated:  07/10/19 2202     Extra Tube hold for add-on     Comment: Auto resulted       Gold Top - SST [[id number]] Collected:  07/10/19 2022    Specimen:  Blood Updated:  07/10/19 2202     Extra Tube Hold for add-ons.     Comment: Auto resulted.       Green Top (Gel) [[id number]] Collected:  07/10/19 2022    Specimen:  Blood Updated:  07/10/19 2202     Extra Tube Hold for add-ons.     Comment: Auto resulted.       Green Top (No Gel) [[id number]] Collected:  07/10/19 2022    Specimen:  Blood Updated:  07/10/19 2202     Extra Tube Hold for add-ons.     Comment: Auto resulted.       TSH [[id number]]  (Normal) Collected:  07/10/19 2022    Specimen:  Blood Updated:  07/10/19  2133     TSH 1.440 mIU/mL     D-dimer, Quantitative [[id number]]  (Normal) Collected:  07/10/19 2022    Specimen:  Blood Updated:  07/10/19 2124     D-Dimer, Quantitative 0.43 MCGFEU/mL     BNP [[id number]]  (Normal) Collected:  07/10/19 2022    Specimen:  Blood Updated:  07/10/19 2102     proBNP 46.5 pg/mL     Troponin [[id number]]  (Normal) Collected:  07/10/19 2022    Specimen:  Blood Updated:  07/10/19 2102     Troponin I <0.012 ng/mL     Narrative:       Normal Patient Upper Reference Limit (URL) (99th Percentile)=0.03 ng/mL   Non-AMI Illness Reference Limit=0.03-0.11 ng/mL   AMI Confirmation=0.12 ng/mL and above    Comprehensive Metabolic Panel [[id number]]  (Abnormal) Collected:  07/10/19 2022    Specimen:  Blood Updated:  07/10/19 2102     Glucose 109 mg/dL      BUN 18 mg/dL      Creatinine 0.70 mg/dL      Sodium 138 mmol/L      Potassium 3.6 mmol/L      Chloride 103 mmol/L      CO2 28.0 mmol/L      Calcium 9.6 mg/dL      Total Protein 8.2 g/dL      Albumin 4.60 g/dL      ALT (SGPT) 36 U/L      AST (SGOT) 34 U/L      Alkaline Phosphatase 117 U/L      Total Bilirubin 0.4 mg/dL      eGFR Non African Amer 88 mL/min/1.73      Globulin 3.6 gm/dL      A/G Ratio 1.3 g/dL      BUN/Creatinine Ratio 25.7     Anion Gap 10.6 mmol/L     Narrative:       GFR Normal >60  Chronic Kidney Disease <60  Kidney Failure <15    Magnesium [[id number]]  (Normal) Collected:  07/10/19 2022    Specimen:  Blood Updated:  07/10/19 2102     Magnesium 1.9 mg/dL     CBC & Differential [[id number]] Collected:  07/10/19 2022    Specimen:  Blood Updated:  07/10/19 2035    Narrative:       The following orders were created for panel order CBC & Differential.  Procedure                               Abnormality         Status                     ---------                               -----------         ------                     CBC Auto Differential[[id number]]        Abnormal            Final result                 Please view results for these  tests on the individual orders.    CBC Auto Differential [[id number]]  (Abnormal) Collected:  07/10/19 2022    Specimen:  Blood Updated:  07/10/19 2035     WBC 9.25 10*3/mm3      RBC 4.90 10*6/mm3      Hemoglobin 14.3 g/dL      Hematocrit 43.2 %      MCV 88.2 fL      MCH 29.2 pg      MCHC 33.1 g/dL      RDW 12.2 %      RDW-SD 39.8 fl      MPV 10.8 fL      Platelets 228 10*3/mm3      Neutrophil % 62.5 %      Lymphocyte % 26.4 %      Monocyte % 9.1 %      Eosinophil % 1.5 %      Basophil % 0.3 %      Immature Grans % 0.2 %      Neutrophils, Absolute 5.78 10*3/mm3      Lymphocytes, Absolute 2.44 10*3/mm3      Monocytes, Absolute 0.84 10*3/mm3      Eosinophils, Absolute 0.14 10*3/mm3      Basophils, Absolute 0.03 10*3/mm3      Immature Grans, Absolute 0.02 10*3/mm3      nRBC 0.0 /100 WBC         Imaging Results (last 24 hours)     Procedure Component Value Units Date/Time    XR Chest 1 View [[id number]] Collected:  07/11/19 0740     Updated:  07/11/19 0742    Narrative:       PROCEDURE: XR CHEST 1 VW-     HISTORY: afib; I48.91-Unspecified atrial fibrillation     COMPARISON: None.     FINDINGS: The heart is normal in size. The mediastinum is unremarkable.  The lungs are clear. There is no pneumothorax.  There are no acute  osseous abnormalities.       Impression:       No acute cardiopulmonary process.     Continued followup is recommended.     This report was finalized on 7/11/2019 7:40 AM by Radha Singh M.D..        ECG/EMG Results (last 24 hours)     ** No results found for the last 24 hours. **          Physician Progress Notes (last 48 hours) (Notes from 7/9/2019  9:19 AM through 7/11/2019  9:19 AM)     No notes of this type exist for this encounter.           Consult Notes (last 48 hours) (Notes from 7/9/2019  9:20 AM through 7/11/2019  9:20 AM)      Escobar Salguero MD at 7/11/2019  7:03 AM            Referring Provider: Dr Suarez  Reason for Consultation: afib   Patient Care Team:  Deon Garcia MD  as PCP - General        History of present illness:    Middle-aged lady who was watching TV yesterday and abruptly started feeling her heart fly on her.  Patient started getting severely short of breath and dizzy hence came into the emergency room.  At the emergency room was noted to be in atrial fibrillation with rapid ventricular rate with rates up to 180 beats a minute.  Was put on Cardizem and metoprolol and subsequently admitted.    Patient works in a  center has not had any symptoms with activity or exertion has not had any chest pains.  Think she does have an palpitation that comes and goes on and off.  This happens maybe once a month or so.  Nothing has stayed this long.  Has not had any swelling of the lower extremities there is no PND or orthopnea.    Review of Systems   Pertinent items are noted in HPI  Review of Systems      History    Baseline EKG: Not available.    LV function: Pending.    CAD work-up: None.    Perimenopausal status.    Palpitation.    Personal history:    Non-smoker does not drink alcohol does not abuse drugs functional status the patient has been reasonable.    Family history:    3 sisters have not had any cardiac issues parents have not had any cardiac issues.    Review of symptoms:    No cough cold fever chills nausea vomiting diarrhea no abdominal pain loss of consciousness PND orthopnea stroke weakness joint swelling rest of the review of symptoms are negative.  Past Surgical History:   Procedure Laterality Date   •  SECTION     • COLONOSCOPY N/A 2017    Procedure: COLONOSCOPY;  Surgeon: Michele Marion MD;  Location: [facility] ENDOSCOPY;  Service:    • KNEE ARTHROSCOPY Left 2017    Procedure: KNEE ARTHROSCOPY LEFT  WITH PARTIAL MEDIAL and  LATERAL  MENISCECTOMY;  Surgeon: Dusty Dangelo MD;  Location: [facility] OR;  Service:    • PARTIAL HYSTERECTOMY  2003   • TRIGGER FINGER RELEASE Right     3RD FINGER   , Family History   Problem Relation Age of Onset  "  • Pancreatic cancer Mother    • Colon cancer Neg Hx    • Liver cancer Neg Hx    • Liver disease Neg Hx    • Stomach cancer Neg Hx    • Esophageal cancer Neg Hx    , Social History     Tobacco Use   • Smoking status: Never Smoker   • Smokeless tobacco: Never Used   Substance Use Topics   • Alcohol use: Yes     Comment: SOCIAL   • Drug use: No   , Medications Prior to Admission   Medication Sig Dispense Refill Last Dose   • Calcium Carbonate-Vit D-Min (CALCIUM 1200 PO) Take 1,000 mg by mouth.      • Vitamin D, Cholecalciferol, (CHOLECALCIFEROL) 400 units tablet Take 400 Units by mouth Daily.      , Scheduled Meds:    enoxaparin 1 mg/kg Subcutaneous Q12H   sodium chloride 3 mL Intravenous Q12H   , Continuous Infusions:    diltiaZEM 5-15 mg/hr Last Rate: 5 mg/hr (07/11/19 0220)   Pharmacy to Dose enoxaparin (LOVENOX)     , PRN Meds:  •  acetaminophen  •  aluminum-magnesium hydroxide-simethicone  •  bisacodyl  •  ondansetron ODT  •  Pharmacy to Dose enoxaparin (LOVENOX)  •  sodium chloride  •  [COMPLETED] Insert peripheral IV **AND** sodium chloride  •  sodium chloride, Allergies:  Patient has no known allergies.     OBJECTIVE:    Vital Sign Min/Max for last 24 hours  Temp  Min: 97.6 °F (36.4 °C)  Max: 97.6 °F (36.4 °C)   BP  Min: 87/69  Max: 127/95   Pulse  Min: 64  Max: 184   Resp  Min: 18  Max: 18   SpO2  Min: 90 %  Max: 100 %   No Data Recorded   Weight  Min: 67.1 kg (148 lb)  Max: 68 kg (150 lb)     Flowsheet Rows      First Filed Value   Admission Height  162.6 cm (64\") Documented at 07/10/2019 1955   Admission Weight  67.1 kg (148 lb) Documented at 07/10/2019 1955               Physical Exam:     General Appearance:    Alert, cooperative, in no acute distress   Head:    Normocephalic, without obvious abnormality, atraumatic   Eyes:            Lids and lashes normal, conjunctivae and sclerae normal, no   icterus, no pallor, corneas clear, PERRLA   Ears:    Ears appear intact with no abnormalities noted   Throat:  "  No oral lesions, no thrush, oral mucosa moist   Neck:   No adenopathy, supple, trachea midline, no thyromegaly, no     carotid bruit, no JVD   Back:     No kyphosis present, no scoliosis present, no skin lesions,       erythema or scars, no tenderness to percussion or                   palpation,   range of motion normal   Lungs:     Clear to auscultation,respirations regular, even and                   unlabored    Heart:    Regular rhythm and normal rate, normal S1 and S2, no            murmur, no gallop, no rub, no click   Breast Exam:    Deferred   Abdomen:     Normal bowel sounds, no masses, no organomegaly, soft        non-tender, non-distended, no guarding, no rebound                 tenderness   Genitalia:    Deferred   Extremities:   Moves all extremities well, no edema, no cyanosis, no              redness   Pulses:   Pulses palpable and equal bilaterally   Skin:   No bleeding, bruising or rash   Lymph nodes:   No palpable adenopathy   Neurologic:   Cranial nerves 2 - 12 grossly intact, sensation intact, DTR        present and equal bilaterally       Results Review:   I reviewed the patient's new clinical results.    EKG: Atrial fibrillation with rapid ventricular rate.    Telemetry: Atrial fibrillation with borderline rate control..    LAB DATA :           WBC   Date Value Ref Range Status   07/11/2019 6.09 3.40 - 10.80 10*3/mm3 Final     RBC   Date Value Ref Range Status   07/11/2019 4.36 3.77 - 5.28 10*6/mm3 Final     Hemoglobin   Date Value Ref Range Status   07/11/2019 12.8 12.0 - 15.9 g/dL Final     Hematocrit   Date Value Ref Range Status   07/11/2019 38.9 34.0 - 46.6 % Final     MCV   Date Value Ref Range Status   07/11/2019 89.2 79.0 - 97.0 fL Final     MCH   Date Value Ref Range Status   07/11/2019 29.4 26.6 - 33.0 pg Final     MCHC   Date Value Ref Range Status   07/11/2019 32.9 31.5 - 35.7 g/dL Final     RDW   Date Value Ref Range Status   07/11/2019 12.4 12.3 - 15.4 % Final     RDW-SD   Date  Value Ref Range Status   07/11/2019 40.6 37.0 - 54.0 fl Final     MPV   Date Value Ref Range Status   07/11/2019 11.3 6.0 - 12.0 fL Final     Platelets   Date Value Ref Range Status   07/11/2019 207 140 - 450 10*3/mm3 Final     Neutrophil %   Date Value Ref Range Status   07/11/2019 51.7 42.7 - 76.0 % Final     Lymphocyte %   Date Value Ref Range Status   07/11/2019 36.5 19.6 - 45.3 % Final     Monocyte %   Date Value Ref Range Status   07/11/2019 9.2 5.0 - 12.0 % Final     Eosinophil %   Date Value Ref Range Status   07/11/2019 2.1 0.3 - 6.2 % Final     Basophil %   Date Value Ref Range Status   07/11/2019 0.3 0.0 - 1.5 % Final     Immature Grans %   Date Value Ref Range Status   07/11/2019 0.2 0.0 - 0.5 % Final     Neutrophils, Absolute   Date Value Ref Range Status   07/11/2019 3.15 1.70 - 7.00 10*3/mm3 Final     Lymphocytes, Absolute   Date Value Ref Range Status   07/11/2019 2.22 0.70 - 3.10 10*3/mm3 Final     Monocytes, Absolute   Date Value Ref Range Status   07/11/2019 0.56 0.10 - 0.90 10*3/mm3 Final     Eosinophils, Absolute   Date Value Ref Range Status   07/11/2019 0.13 0.00 - 0.40 10*3/mm3 Final     Basophils, Absolute   Date Value Ref Range Status   07/11/2019 0.02 0.00 - 0.20 10*3/mm3 Final     Immature Grans, Absolute   Date Value Ref Range Status   07/11/2019 0.01 0.00 - 0.05 10*3/mm3 Final     nRBC   Date Value Ref Range Status   07/11/2019 0.0 0.0 - 0.2 /100 WBC Final       Lab Results   Component Value Date    GLUCOSE 91 07/11/2019    BUN 12 07/11/2019    CREATININE 0.70 07/11/2019    EGFRIFNONA 88 07/11/2019    BCR 17.1 07/11/2019    CO2 28.0 07/11/2019    CALCIUM 8.8 07/11/2019    ALBUMIN 4.60 07/10/2019    AST 34 07/10/2019    ALT 36 07/10/2019       Lab Results   Component Value Date    TROPONINI <0.012 07/11/2019       No results found for: DDIMER    No results found for: SITE, ALLENTEST, PHART, LZE3IBP, PO2ART, ART5OMN, BASEEXCESS, A5LHMUJM, HGBBG, HCTABG, OXYHEMOGLOBI, METHHGBN, CARBOXYHGB,  CO2CT, BAROMETRIC, MODALITY, FIO2  No results found for: HGBA1C  Results from last 7 days   Lab Units 07/10/19  2022   TSH mIU/mL 1.440     No results found for: LIPASE    IMAGING DATA:     No results found.      DIAGNOSIS    #1 atrial fibrillation:  Patient has presented with atrial fibrillation with rapid ventricular rate she has been symptomatic with the same.  Received beta-blocker and calcium channel blocker in the ER.  Rate control has been borderline at this time.  Given her age and the first episode of this would proceed with electrical cardioversion.  This appears to have initiated at 6:00 yesterday evening.  After the cardioversion we will plan on a transthoracic echocardiogram.    2.  Coronary artery disease:  Risk profile appears to be intermediate.  No symptoms with exertion at this time.  Enzymes have been negative at some point will need a stress test.    3.  Mitral valve prolapse:  The transthoracic echo should help with further donating the same and associated mitral insufficiency if any once the patient is back in sinus rhythm.    Thank you for letting me take part in the care of Ms. Flores.          Atrial fibrillation with RVR (CMS/HCC)          I discussed the patients findings and my recommendations with patient    Escobar Salguero MD  07/11/19  7:03 AM      Please note that portions of this note may have been completed with a voice recognition program. Efforts were made to edit the dictations, but occasionally words are mistranscribed.             Electronically signed by Escobar Salguero MD at 7/11/2019  7:07 AM

## 2019-07-11 NOTE — PLAN OF CARE
Problem: Patient Care Overview  Goal: Plan of Care Review  Outcome: Ongoing (interventions implemented as appropriate)   07/10/19 2230 07/10/19 2258   Plan of Care Review   Progress --  improving   OTHER   Outcome Summary --  Rate controlled with Cardizem drip. Awaiting CXR results. Tammie consult. Patient resting in bed. Spouse at bedside. Will continue to monitor.    Coping/Psychosocial   Plan of Care Reviewed With patient;spouse --        Problem: Arrhythmia/Dysrhythmia (Symptomatic) (Adult)  Intervention: Prevent/Manage Thrombi/Emboli   07/10/19 2230   Interventions   VTE Prevention/Management bilateral;dorsiflexion/plantar flexion performed     Intervention: Promote Hemodynamic Stability   07/10/19 2230 07/10/19 2258   Nutrition Interventions   Fluid/Electrolyte Management --  fluids adjusted   Cognitive Interventions   Sensory Stimulation Regulation --  care clustered;music/television provided for relaxation   Positioning   Head of Bed (HOB) HOB at 45 degrees --      Intervention: Monitor/Manage Cardiac Rhythm Disturbance   07/10/19 2258   Cardiac Interventions   Dysrhythmia Management (tammie consult)   Safety Interventions   Stabilization Measures fluid resuscitation initiated; cardizem drip       Goal: Signs and Symptoms of Listed Potential Problems Will be Absent, Minimized or Managed (Arrhythmia/Dysrhythmia)  Outcome: Ongoing (interventions implemented as appropriate)   07/10/19 2258   Goal/Outcome Evaluation   Problems Assessed (Arrhythmia/Dysrhythmia) electrophysiologic conduction defect   Problems Present (Dysrhythmia) electrophysiologic conduction defect

## 2019-07-11 NOTE — CONSULTS
Referring Provider: Dr Suarez  Reason for Consultation: afib   Patient Care Team:  Deon Garcia MD as PCP - General        History of present illness:    Middle-aged lady who was watching TV yesterday and abruptly started feeling her heart fly on her.  Patient started getting severely short of breath and dizzy hence came into the emergency room.  At the emergency room was noted to be in atrial fibrillation with rapid ventricular rate with rates up to 180 beats a minute.  Was put on Cardizem and metoprolol and subsequently admitted.    Patient works in a  center has not had any symptoms with activity or exertion has not had any chest pains.  Think she does have an palpitation that comes and goes on and off.  This happens maybe once a month or so.  Nothing has stayed this long.  Has not had any swelling of the lower extremities there is no PND or orthopnea.    Review of Systems   Pertinent items are noted in HPI  Review of Systems      History    Baseline EKG: Not available.    LV function: Pending.    CAD work-up: None.    Perimenopausal status.    Palpitation.    Personal history:    Non-smoker does not drink alcohol does not abuse drugs functional status the patient has been reasonable.    Family history:    3 sisters have not had any cardiac issues parents have not had any cardiac issues.    Review of symptoms:    No cough cold fever chills nausea vomiting diarrhea no abdominal pain loss of consciousness PND orthopnea stroke weakness joint swelling rest of the review of symptoms are negative.  Past Surgical History:   Procedure Laterality Date   •  SECTION     • COLONOSCOPY N/A 2017    Procedure: COLONOSCOPY;  Surgeon: Michele Marion MD;  Location: [facility] ENDOSCOPY;  Service:    • KNEE ARTHROSCOPY Left 2017    Procedure: KNEE ARTHROSCOPY LEFT  WITH PARTIAL MEDIAL and  LATERAL  MENISCECTOMY;  Surgeon: Dusty Dangelo MD;  Location: [facility] OR;  Service:    • PARTIAL HYSTERECTOMY  2003  "  • TRIGGER FINGER RELEASE Right     3RD FINGER   , Family History   Problem Relation Age of Onset   • Pancreatic cancer Mother    • Colon cancer Neg Hx    • Liver cancer Neg Hx    • Liver disease Neg Hx    • Stomach cancer Neg Hx    • Esophageal cancer Neg Hx    , Social History     Tobacco Use   • Smoking status: Never Smoker   • Smokeless tobacco: Never Used   Substance Use Topics   • Alcohol use: Yes     Comment: SOCIAL   • Drug use: No   , Medications Prior to Admission   Medication Sig Dispense Refill Last Dose   • Calcium Carbonate-Vit D-Min (CALCIUM 1200 PO) Take 1,000 mg by mouth.      • Vitamin D, Cholecalciferol, (CHOLECALCIFEROL) 400 units tablet Take 400 Units by mouth Daily.      , Scheduled Meds:    enoxaparin 1 mg/kg Subcutaneous Q12H   sodium chloride 3 mL Intravenous Q12H   , Continuous Infusions:    diltiaZEM 5-15 mg/hr Last Rate: 5 mg/hr (07/11/19 0220)   Pharmacy to Dose enoxaparin (LOVENOX)     , PRN Meds:  •  acetaminophen  •  aluminum-magnesium hydroxide-simethicone  •  bisacodyl  •  ondansetron ODT  •  Pharmacy to Dose enoxaparin (LOVENOX)  •  sodium chloride  •  [COMPLETED] Insert peripheral IV **AND** sodium chloride  •  sodium chloride, Allergies:  Patient has no known allergies.     OBJECTIVE:    Vital Sign Min/Max for last 24 hours  Temp  Min: 97.6 °F (36.4 °C)  Max: 97.6 °F (36.4 °C)   BP  Min: 87/69  Max: 127/95   Pulse  Min: 64  Max: 184   Resp  Min: 18  Max: 18   SpO2  Min: 90 %  Max: 100 %   No Data Recorded   Weight  Min: 67.1 kg (148 lb)  Max: 68 kg (150 lb)     Flowsheet Rows      First Filed Value   Admission Height  162.6 cm (64\") Documented at 07/10/2019 1955   Admission Weight  67.1 kg (148 lb) Documented at 07/10/2019 1955               Physical Exam:     General Appearance:    Alert, cooperative, in no acute distress   Head:    Normocephalic, without obvious abnormality, atraumatic   Eyes:            Lids and lashes normal, conjunctivae and sclerae normal, no   icterus, no " pallor, corneas clear, PERRLA   Ears:    Ears appear intact with no abnormalities noted   Throat:   No oral lesions, no thrush, oral mucosa moist   Neck:   No adenopathy, supple, trachea midline, no thyromegaly, no     carotid bruit, no JVD   Back:     No kyphosis present, no scoliosis present, no skin lesions,       erythema or scars, no tenderness to percussion or                   palpation,   range of motion normal   Lungs:     Clear to auscultation,respirations regular, even and                   unlabored    Heart:    Regular rhythm and normal rate, normal S1 and S2, no            murmur, no gallop, no rub, no click   Breast Exam:    Deferred   Abdomen:     Normal bowel sounds, no masses, no organomegaly, soft        non-tender, non-distended, no guarding, no rebound                 tenderness   Genitalia:    Deferred   Extremities:   Moves all extremities well, no edema, no cyanosis, no              redness   Pulses:   Pulses palpable and equal bilaterally   Skin:   No bleeding, bruising or rash   Lymph nodes:   No palpable adenopathy   Neurologic:   Cranial nerves 2 - 12 grossly intact, sensation intact, DTR        present and equal bilaterally       Results Review:   I reviewed the patient's new clinical results.    EKG: Atrial fibrillation with rapid ventricular rate.    Telemetry: Atrial fibrillation with borderline rate control..    LAB DATA :           WBC   Date Value Ref Range Status   07/11/2019 6.09 3.40 - 10.80 10*3/mm3 Final     RBC   Date Value Ref Range Status   07/11/2019 4.36 3.77 - 5.28 10*6/mm3 Final     Hemoglobin   Date Value Ref Range Status   07/11/2019 12.8 12.0 - 15.9 g/dL Final     Hematocrit   Date Value Ref Range Status   07/11/2019 38.9 34.0 - 46.6 % Final     MCV   Date Value Ref Range Status   07/11/2019 89.2 79.0 - 97.0 fL Final     MCH   Date Value Ref Range Status   07/11/2019 29.4 26.6 - 33.0 pg Final     MCHC   Date Value Ref Range Status   07/11/2019 32.9 31.5 - 35.7 g/dL  Final     RDW   Date Value Ref Range Status   07/11/2019 12.4 12.3 - 15.4 % Final     RDW-SD   Date Value Ref Range Status   07/11/2019 40.6 37.0 - 54.0 fl Final     MPV   Date Value Ref Range Status   07/11/2019 11.3 6.0 - 12.0 fL Final     Platelets   Date Value Ref Range Status   07/11/2019 207 140 - 450 10*3/mm3 Final     Neutrophil %   Date Value Ref Range Status   07/11/2019 51.7 42.7 - 76.0 % Final     Lymphocyte %   Date Value Ref Range Status   07/11/2019 36.5 19.6 - 45.3 % Final     Monocyte %   Date Value Ref Range Status   07/11/2019 9.2 5.0 - 12.0 % Final     Eosinophil %   Date Value Ref Range Status   07/11/2019 2.1 0.3 - 6.2 % Final     Basophil %   Date Value Ref Range Status   07/11/2019 0.3 0.0 - 1.5 % Final     Immature Grans %   Date Value Ref Range Status   07/11/2019 0.2 0.0 - 0.5 % Final     Neutrophils, Absolute   Date Value Ref Range Status   07/11/2019 3.15 1.70 - 7.00 10*3/mm3 Final     Lymphocytes, Absolute   Date Value Ref Range Status   07/11/2019 2.22 0.70 - 3.10 10*3/mm3 Final     Monocytes, Absolute   Date Value Ref Range Status   07/11/2019 0.56 0.10 - 0.90 10*3/mm3 Final     Eosinophils, Absolute   Date Value Ref Range Status   07/11/2019 0.13 0.00 - 0.40 10*3/mm3 Final     Basophils, Absolute   Date Value Ref Range Status   07/11/2019 0.02 0.00 - 0.20 10*3/mm3 Final     Immature Grans, Absolute   Date Value Ref Range Status   07/11/2019 0.01 0.00 - 0.05 10*3/mm3 Final     nRBC   Date Value Ref Range Status   07/11/2019 0.0 0.0 - 0.2 /100 WBC Final       Lab Results   Component Value Date    GLUCOSE 91 07/11/2019    BUN 12 07/11/2019    CREATININE 0.70 07/11/2019    EGFRIFNONA 88 07/11/2019    BCR 17.1 07/11/2019    CO2 28.0 07/11/2019    CALCIUM 8.8 07/11/2019    ALBUMIN 4.60 07/10/2019    AST 34 07/10/2019    ALT 36 07/10/2019       Lab Results   Component Value Date    TROPONINI <0.012 07/11/2019       No results found for: DDIMER    No results found for: SITE, ALLENTEST,  PHART, RYF7QJD, PO2ART, TRH4LCD, BASEEXCESS, N7UBMMNW, HGBBG, HCTABG, OXYHEMOGLOBI, METHHGBN, CARBOXYHGB, CO2CT, BAROMETRIC, MODALITY, FIO2  No results found for: HGBA1C  Results from last 7 days   Lab Units 07/10/19  2022   TSH mIU/mL 1.440     No results found for: LIPASE    IMAGING DATA:     No results found.      DIAGNOSIS    #1 atrial fibrillation:  Patient has presented with atrial fibrillation with rapid ventricular rate she has been symptomatic with the same.  Received beta-blocker and calcium channel blocker in the ER.  Rate control has been borderline at this time.  Given her age and the first episode of this would proceed with electrical cardioversion.  This appears to have initiated at 6:00 yesterday evening.  After the cardioversion we will plan on a transthoracic echocardiogram.    2.  Coronary artery disease:  Risk profile appears to be intermediate.  No symptoms with exertion at this time.  Enzymes have been negative at some point will need a stress test.    3.  Mitral valve prolapse:  The transthoracic echo should help with further donating the same and associated mitral insufficiency if any once the patient is back in sinus rhythm.    Thank you for letting me take part in the care of Ms. Flores.          Atrial fibrillation with RVR (CMS/[facility])          I discussed the patients findings and my recommendations with patient    Escobar Salguero MD  07/11/19  7:03 AM      Please note that portions of this note may have been completed with a voice recognition program. Efforts were made to edit the dictations, but occasionally words are mistranscribed.

## 2019-07-11 NOTE — H&P
[facility]   HISTORY AND PHYSICAL      Name:  Diana Flores   Age:  53 y.o.  Sex:  female  :  1965  MRN:  [id number]   Visit Number:  [id number]  Admission Date:  7/10/2019  Date Of Service:  07/10/19  Primary Care Physician:  Deon Garcia MD    Chief Complaint:   Palpitations    History Of Presenting Illness:      53-year-old female relatively healthy except for mitral valve prolapse in the past.  She states she has had a history of palpitations but these are usually not persistent.  Presents the ER today with complaints of tachycardia, palpitations some associated chest pressure and shortness of breath, all resolved presently.  Presented in A. fib with RVR at 180, received metoprolol, and Cardizem drip with improvement.  Feels much better with no complaints presently, denies chest pain, chest pressure, shortness of breath.  Saturation 96% on room air.  Received Lovenox in ER as well.  Laboratory values reviewed.  Troponin negative, d-dimer negative.    Review Of Systems:  General: Patient denies any fevers, chills or loss of consciousness.   Psychological: Denies any hallucinations and delusions, no homicidal or suicidal ideations.  Ophthalmic: Denies any diplopia or transient loss of vision.  ENT: Denies sore throat, nasal congestion or ear pain.   Allergy and Immunology: Denies rash or itching.  Hematological and Lymphatic: Denies neck swelling or easy bleeding.  Endocrine: Denies any recent unintentional weight gain or loss.  Respiratory: Presently denies cough, shortness of breath, hemoptysis, or pleurisy.  Cardiovascular: Denies chest pain . No history of exertional chest pain.  Positive for palpitations on admission  Gastrointestinal: Denies nausea and vomiting. Denies any abdominal pain. No diarrhea.  Genito-Urinary: Denies dysuria or hematuria.  Neurological: Denies any focal weakness. No loss of consciousness. Denies any numbness. Denies neck pain. Denies  visual changes.   Dermatological: Denies any redness or pruritis, or rash.    ROS otherwise reviewed in detail and felt to be noncontributing.     Past Medical History:    Past Medical History:   Diagnosis Date   • Arthritis    • Lower back pain    • MVP (mitral valve prolapse)    • Palpitations    • Snores    • Wears glasses        Past Surgical history:    Past Surgical History:   Procedure Laterality Date   •  SECTION     • COLONOSCOPY N/A 2017    Procedure: COLONOSCOPY;  Surgeon: Michele Marion MD;  Location: [facility] ENDOSCOPY;  Service:    • KNEE ARTHROSCOPY Left 2017    Procedure: KNEE ARTHROSCOPY LEFT  WITH PARTIAL MEDIAL and  LATERAL  MENISCECTOMY;  Surgeon: Dusty Dangelo MD;  Location: [facility] OR;  Service:    • PARTIAL HYSTERECTOMY  2003   • TRIGGER FINGER RELEASE Right     3RD FINGER       Social History:    Social History     Socioeconomic History   • Marital status:      Spouse name: Not on file   • Number of children: Not on file   • Years of education: Not on file   • Highest education level: Not on file   Tobacco Use   • Smoking status: Never Smoker   • Smokeless tobacco: Never Used   Substance and Sexual Activity   • Alcohol use: Yes     Comment: SOCIAL   • Drug use: No   • Sexual activity: Defer       Family History:    Family History   Problem Relation Age of Onset   • Pancreatic cancer Mother    • Colon cancer Neg Hx    • Liver cancer Neg Hx    • Liver disease Neg Hx    • Stomach cancer Neg Hx    • Esophageal cancer Neg Hx        Allergies:      Patient has no known allergies.    Home Medications:    Prior to Admission Medications     Prescriptions Last Dose Informant Patient Reported? Taking?    ondansetron ODT (ZOFRAN-ODT) 4 MG disintegrating tablet   No No    Take 1 tablet by mouth Every 6 (Six) Hours As Needed for Nausea or Vomiting.             ED Medications:    Medications   sodium chloride 0.9 % flush 10 mL (not administered)   sodium chloride 0.9 % flush 10  mL (not administered)   metoprolol tartrate (LOPRESSOR) injection 5 mg (5 mg Intravenous Given 7/10/19 2101)   diltiaZEM (CARDIZEM) 125 mg in sodium chloride 0.9 % 125 mL (1 mg/mL) infusion (5 mg/hr Intravenous New Bag 7/10/19 2121)   enoxaparin (LOVENOX) syringe 70 mg (not administered)   sodium chloride 0.9 % bolus 1,000 mL (1,000 mL Intravenous New Bag 7/10/19 2055)   diltiaZEM (CARDIZEM) injection 10 mg (10 mg Intravenous Given 7/10/19 2118)       Vital Signs:    Temp:  [97.6 °F (36.4 °C)] 97.6 °F (36.4 °C)  Heart Rate:  [] 80  Resp:  [18] 18  BP: ()/() 109/82        07/10/19  1955   Weight: 67.1 kg (148 lb)       Body mass index is 25.4 kg/m².    Physical Exam:    General Appearance:  Alert and cooperative, not in any acute distress.   Head:  Atraumatic and normocephalic, without obvious abnormality.   Eyes:          PERRLA, conjunctivae and sclerae normal, no Icterus. No pallor. Extra-occular movements are within normal limits.   Throat: No oral lesions, no thrush, oral mucosa moist.   Neck: Supple, trachea midline, no thyromegaly, no carotid bruit. No JVD.   Back:   No kyphoscoliosis present. No tenderness to palpation,   range of motion normal.   Lungs:   Chest shape is normal. Breath sounds heard bilaterally equally.  No crackles or wheezing. No Pleural rub or bronchial breathing. Otherwise lungs are clear.   Heart:  Normal S1 and S2, no murmur, no gallop, no rub. No JVD.  Irregularly irregular rhythm rate between 105 and 120.   Abdomen:   Normal bowel sounds, no masses, no organomegaly. Soft non-tender, non-distended, no guarding, no rebound tenderness.   Extremities: Moves all extremities well, no edema, no cyanosis, no clubbing.   Pulses: Pulses palpable and equal bilaterally.   Skin: No bleeding, bruising or rash.   Neurologic: Alert and oriented x 3. Moves all four limbs equally. No tremors. No facial asymetry. Cranial nerves 2-12 intact. Musculosensory exam intact.   Psychiatric:  Mood and affect normal. Denies homicidal or suicidal ideations.     Laboratory data:    I have reviewed the labs done in the emergency room.    Results from last 7 days   Lab Units 07/10/19  2022   SODIUM mmol/L 138   POTASSIUM mmol/L 3.6   CHLORIDE mmol/L 103   CO2 mmol/L 28.0   BUN mg/dL 18   CREATININE mg/dL 0.70   CALCIUM mg/dL 9.6   BILIRUBIN mg/dL 0.4   ALK PHOS U/L 117   ALT (SGPT) U/L 36   AST (SGOT) U/L 34   GLUCOSE mg/dL 109*     Results from last 7 days   Lab Units 07/10/19  2022   WBC 10*3/mm3 9.25   HEMOGLOBIN g/dL 14.3   HEMATOCRIT % 43.2   PLATELETS 10*3/mm3 228         Results from last 7 days   Lab Units 07/10/19  2022   TROPONIN I ng/mL <0.012     Results from last 7 days   Lab Units 07/10/19  2022   PROBNP pg/mL 46.5                       Invalid input(s): USDES,  BLOODU, NITRITITE, BACT, EP  Pain Management Panel     There is no flowsheet data to display.              EKG:      A. fib otherwise no acute changes    Radiology:    Imaging Results (last 72 hours)     ** No results found for the last 72 hours. **          Assessment:    1.  New onset A. Fib  2.  History of mitral valve prolapse    Plan:     Serial troponins, Dr. Salguero consult.  Continue IV Cardizem.  Continue Lovenox per pharmacy.  Digoxin 0.25 IV x1.  Cardiac 2D echo. Check TSH, T3 and T4.  ###Chest x-ray report is pending, chest x-ray report as per hospitalist team taking over service in a.m. ####      Mickey Marroquin MD  07/10/19  10:02 PM    Portions of this note may have been completed with Dragon, a voice recognition program. Not all errors in transcription may have been detected prior to signing.

## 2019-07-11 NOTE — ED PROVIDER NOTES
Subjective   53-year-old female presents to the ED with a chief complaint of palpitations.  The patient indicates that she was outside getting ready to grill when she felt like her heart started racing.  She states that she has had some episodes of palpitations previously but none have lasted this long.  She states that the started a proximally 1 hour ago and have been consistent.  She states that she has mild shortness of breath associated with the rapid heart rate.  She denies fever chills.  No chest pain.  No nausea vomiting diarrhea or abdominal pain.  No recent illnesses.  No other complaints at this time.  No history of regular heart rhythm or A. fib.            Review of Systems   Constitutional: Negative for fatigue and fever.   Respiratory: Positive for shortness of breath.    Cardiovascular: Positive for palpitations. Negative for chest pain and leg swelling.   All other systems reviewed and are negative.      Past Medical History:   Diagnosis Date   • Arthritis    • Lower back pain    • MVP (mitral valve prolapse)    • Palpitations    • Snores    • Wears glasses        No Known Allergies    Past Surgical History:   Procedure Laterality Date   •  SECTION     • COLONOSCOPY N/A 2017    Procedure: COLONOSCOPY;  Surgeon: Michele Marion MD;  Location: [facility] ENDOSCOPY;  Service:    • KNEE ARTHROSCOPY Left 2017    Procedure: KNEE ARTHROSCOPY LEFT  WITH PARTIAL MEDIAL and  LATERAL  MENISCECTOMY;  Surgeon: Dusty Dangelo MD;  Location: [facility] OR;  Service:    • PARTIAL HYSTERECTOMY  2003   • TRIGGER FINGER RELEASE Right     3RD FINGER       Family History   Problem Relation Age of Onset   • Pancreatic cancer Mother    • Colon cancer Neg Hx    • Liver cancer Neg Hx    • Liver disease Neg Hx    • Stomach cancer Neg Hx    • Esophageal cancer Neg Hx        Social History     Socioeconomic History   • Marital status:      Spouse name: Not on file   • Number of children: Not on file   •  Years of education: Not on file   • Highest education level: Not on file   Tobacco Use   • Smoking status: Never Smoker   • Smokeless tobacco: Never Used   Substance and Sexual Activity   • Alcohol use: Yes     Comment: SOCIAL   • Drug use: No   • Sexual activity: Defer           Objective   Physical Exam   Constitutional: She is oriented to person, place, and time. She appears well-developed and well-nourished. No distress.   HENT:   Head: Normocephalic and atraumatic.   Nose: Nose normal.   Eyes: Conjunctivae and EOM are normal.   Cardiovascular: Intact distal pulses.   No murmur heard.  Tachycardic.  Irregular rhythm.  Atrial fibrillation with RVR on the monitor.   Pulmonary/Chest: Effort normal and breath sounds normal. No respiratory distress. She has no wheezes.   Abdominal: Soft. She exhibits no distension. There is no tenderness. There is no guarding.   Musculoskeletal: She exhibits no edema or deformity.   Neurological: She is alert and oriented to person, place, and time. No cranial nerve deficit.   Skin: She is not diaphoretic.   Nursing note and vitals reviewed.      Procedures           ED Course  ED Course as of Jul 11 0453   Wed Jul 10, 2019   2217 Initial EKG interpreted by me.  Atrial fibrillation with rapid ventricular response.  Rate of 160.  Nonspecific ST segment depression.  Abnormal EKG.  [CG]   2218 Repeat EKG interpreted by me.  Atrial fibrillation.  Rate of 90.  Frequent PVCs.  No ST segment depression or elevation.  No T wave abnormalities.  Abnormal EKG  [CG]      ED Course User Index  [CG] Omar Underwood B, DO                  MDM  Patient presented to the ED in A. fib with RVR.  Unknown initial onset.  States she has had some prior episodes of palpitations before which normally resolved.  Initial EKG showed a heart rate greater than 140.  Multiple doses of metoprolol were given to try and convert the patient but they were unsuccessful.  Patient was started on Cardizem drip which did  control her heart rate but not her rhythm.  She will be given Lovenox and admitted to the hospital for further evaluation and medical management.    Final diagnoses:   Atrial fibrillation with RVR (CMS/HCC)            Omar Underwood, DO  07/11/19 0451

## 2019-07-11 NOTE — PROGRESS NOTES
Discharge Planning Assessment  [facility]     Patient Name: Diana Flores  MRN: [id number]  Today's Date: 7/11/2019    Admit Date: 7/10/2019    Discharge Needs Assessment     Row Name 07/11/19 1108       Living Environment    Lives With  spouse;child(shruti), dependent    Name(s) of Who Lives With Patient  --  Sean and sons    Primary Care Provided by  self    Provides Primary Care For  no one    Family Caregiver if Needed  none    Quality of Family Relationships  supportive    Able to Return to Prior Arrangements  yes    Living Arrangement Comments  -- Lives in 2 story house has 2 steps,        Discharge Needs Assessment    Readmission Within the Last 30 Days  no previous admission in last 30 days    Concerns to be Addressed  no discharge needs identified    Anticipated Changes Related to Illness  none    Equipment Needed After Discharge  none    Offered/Gave Vendor List  no No needs        Discharge Plan     Row Name 07/11/19 1116       Plan    Plan Spoke to pt in room.  Has no POA or Living Will.  Lives with  and sons in a 2 story house. Address and phone #'s verified.  Has steps but has no problem with them.  Independent with [employer].  Has no medical equipment.  Plans to go home at discharge.  Has transportation.  Denies discharge needs.          Destination      No service coordination in this encounter.      Durable Medical Equipment      No service coordination in this encounter.      Dialysis/Infusion      No service coordination in this encounter.      Home Medical Care      No service coordination in this encounter.      Therapy      No service coordination in this encounter.      Community Resources      No service coordination in this encounter.        Expected Discharge Date and Time     Expected Discharge Date Expected Discharge Time    Jul 13, 2019         Demographic Summary     Row Name 07/11/19 1059       General Information    Admission Type  inpatient    Arrived From  emergency  department    Referral Source  admission list    Reason for Consult  discharge planning    Preferred Language  English        Functional Status     Row Name 07/11/19 1106       Functional Status    Usual Activity Tolerance  good    Current Activity Tolerance  good       Functional Status, IADL    Medications  independent    Meal Preparation  independent    Housekeeping  independent    Laundry  independent    Shopping  independent    IADL Comments  -- Independent        Mental Status Summary    Recent Changes in Mental Status/Cognitive Functioning  no changes        Psychosocial    No documentation.       Abuse/Neglect    No documentation.       Legal    No documentation.       Substance Abuse    No documentation.       Patient Forms    No documentation.           Samantha Will

## 2019-07-12 ENCOUNTER — TELEPHONE (OUTPATIENT)
Dept: INTERNAL MEDICINE | Facility: CLINIC | Age: 54
End: 2019-07-12

## 2019-07-12 VITALS
RESPIRATION RATE: 16 BRPM | OXYGEN SATURATION: 99 % | TEMPERATURE: 97.9 F | DIASTOLIC BLOOD PRESSURE: 65 MMHG | SYSTOLIC BLOOD PRESSURE: 121 MMHG | HEART RATE: 55 BPM | BODY MASS INDEX: 25.81 KG/M2 | HEIGHT: 64 IN | WEIGHT: 151.2 LBS

## 2019-07-12 LAB — T3FREE SERPL-MCNC: 3.8 PG/ML (ref 2–4.4)

## 2019-07-12 PROCEDURE — 99238 HOSP IP/OBS DSCHRG MGMT 30/<: CPT | Performed by: INTERNAL MEDICINE

## 2019-07-12 RX ORDER — ASPIRIN 81 MG/1
81 TABLET ORAL DAILY
Qty: 30 TABLET | Refills: 0 | Status: SHIPPED | OUTPATIENT
Start: 2019-07-12

## 2019-07-12 RX ORDER — ATORVASTATIN CALCIUM 40 MG/1
40 TABLET, FILM COATED ORAL NIGHTLY
Qty: 30 TABLET | Refills: 0 | Status: SHIPPED | OUTPATIENT
Start: 2019-07-12 | End: 2019-07-15 | Stop reason: SDUPTHER

## 2019-07-12 RX ORDER — METOPROLOL SUCCINATE 25 MG/1
25 TABLET, EXTENDED RELEASE ORAL
Status: DISCONTINUED | OUTPATIENT
Start: 2019-07-12 | End: 2019-07-12 | Stop reason: HOSPADM

## 2019-07-12 RX ORDER — METOPROLOL SUCCINATE 25 MG/1
25 TABLET, EXTENDED RELEASE ORAL
Qty: 30 TABLET | Refills: 0 | Status: SHIPPED | OUTPATIENT
Start: 2019-07-12 | End: 2019-07-15 | Stop reason: SDUPTHER

## 2019-07-12 RX ADMIN — METOPROLOL SUCCINATE 25 MG: 25 TABLET, EXTENDED RELEASE ORAL at 08:34

## 2019-07-12 NOTE — PAYOR COMM NOTE
"TO:AETNA  FROM:MISBAH MARCOS, RN PHONE [phone]  [facility]    Diana Morales (53 y.o. Female)     Date of Birth Social Security Number Address Home Phone MRN    1965  [address] [address] [phone] [id number]    [employer] Mar[employer] Status          [employer]        Admission Date Admission Type Admitting Provider Attending Provider Department, Room/Bed    7/10/19 Emergency Mickey Marroquin MD  [facility] MED SURG  3, 304/1    Discharge Date Discharge Disposition Discharge Destination        2019 Home or Self Care              Attending Provider:  (none)   Allergies:  No Known Allergies    Isolation:  None   Infection:  None   Code Status:  CPR    Ht:  162.6 cm (64\")   Wt:  68.6 kg (151 lb 3.2 oz)    Admission Cmt:  None   Principal Problem:  None                Active Insurance as of 7/10/2019     Primary Coverage     Payor Plan Insurance Group Employer/Plan Group    [employer] - ASA [id number]     Payor Plan Address Payor Plan Phone Number Payor Plan Fax Number Effective Dates    [address]   2016 - None Entered    [id number]       Subscriber Name Subscriber Birth Date Member ID       JACKIE MORALES 1969 [id number]                 Emergency Contacts      (Rel.) Home Phone Work Phone Mobile Phone    Jackie Morales (Spouse) [phone] -- [phone]               Discharge Summary      Jewel Suarez MD at 2019  7:49 AM              [facility] HOSPITALIST   DISCHARGE SUMMARY      Name:  Diana Morales   Age:  53 y.o.  Sex:  female  :  1965  MRN:  [id number]   Visit Number:  [id number]    Admission Date:  7/10/2019  Date of Discharge:  2019  Primary Care Physician:  Deon Garcia MD    Important issues to note:    1.  New medications started: Toprol-XL, aspirin, atorvastatin.  2.  Follow-up with Dr. Salguero in 4 to 5 weeks.    Discharge Diagnoses: "     1.  Paroxysmal atrial fibrillation with rapid ventricular rate, present on admission.  2.  Osteopenia.  3.  Hyperlipidemia.      Hyperlipidemia    Paroxysmal atrial fibrillation with RVR (CMS/HCC)    Presenting Problem:    Atrial fibrillation with RVR (CMS/HCC) [I48.91]     Consults:     Consults     Date and Time Order Name Status Description    7/10/2019 2231 Inpatient Cardiology Consult          Consulting Physician(s)     Provider Relationship Specialty    Escobar Salguero MD Consulting Physician Interventional Cardiology        Procedures Performed:    None.    History of presenting illness/Hospital Course:    This is a 53-year-old female, relatively healthy except for osteopenia on calcium and vitamin D supplements was brought to the emergency room by her family with complaints of palpitations associated with dizziness and shortness of breath.  She was sitting on the couch and watching TV when this started.  She did not have any history of recent fever or cough.  No prior history of cardiac illness.  She was evaluated in the emergency room and was noted to have atrial fibrillation with rapid ventricular rate going up to 180 bpm.  She was given IV metoprolol and was subsequently started on Cardizem drip and was admitted to the medical floor.  She was also given a dose of therapeutic Lovenox.  Her initial troponin was negative.  Blood work including CMP and CBC were within normal range.  Hemoglobin A1c was 5.7.  TSH was within normal range at 1.89.    Patient was continued on Cardizem drip and did convert spontaneously to sinus rhythm overnight.  Her Cardizem drip was discontinued and she was seen by Dr. Salguero.  Dr. Salguero had initially planned on cardioversion but since she converted spontaneously this was canceled.  She did have a 2D echocardiogram on 7/11/2019 which showed normal left ventricular ejection fraction and fairly normal valve anatomy.  Her chads score was 1.  She was however noted to  have occasional sinus bradycardia with a heart rate in the 40s.  Her Toprol dose was decreased from 50 mg to 25 mg by Dr. Salguero.  She was noted to have elevated cholesterol and was started on Lipitor per cardiology recommendations.  She does not meet criteria for anticoagulation and will be continued on low-dose aspirin therapy.  I have discussed the patient's condition with Dr. Salguero and he recommended outpatient follow-up in 4 to 5 weeks for further evaluation for coronary artery disease which may include a stress test if indicated.  Patient denies drinking excessive caffeine.  Her discharge condition and plan was discussed with her  who is at the bedside.  She is advised to follow-up with her primary care physician in 1 week.    Vital Signs:    Temp:  [97.8 °F (36.6 °C)-98.5 °F (36.9 °C)] 97.8 °F (36.6 °C)  Heart Rate:  [43-64] 54  Resp:  [16-20] 16  BP: (103-121)/(59-71) 114/64    Physical Exam:    General Appearance:  Alert and cooperative, not in any acute distress.   Head:  Atraumatic and normocephalic, without obvious abnormality.   Eyes:          PERRLA, conjunctivae and sclerae normal, no Icterus. No pallor. Extraocular movements are within normal limits.   Ears:  Ears appear intact with no abnormalities noted.   Throat: No oral lesions, no thrush, oral mucosa moist.   Neck: Supple, trachea midline, no thyromegaly, no carotid bruit.   Back:   No kyphoscoliosis present. No tenderness to palpation,   range of motion normal.   Lungs:   Chest shape is normal. Breath sounds heard bilaterally equally.  No crackles or wheezing. No Pleural rub or bronchial breathing.   Heart:  Normal S1 and S2, no murmur, no gallop, no rub. No JVD.   Abdomen:   Normal bowel sounds, no masses, no organomegaly. Soft, non-tender, non-distended, no guarding, no rebound tenderness.   Extremities: Moves all extremities well, no edema, no cyanosis, no clubbing.   Pulses: Pulses palpable and equal bilaterally.   Skin: No bleeding,  bruising or rash.   Neurologic: Alert and oriented x 3. Moves all four limbs equally. No tremors. No facial asymetry.     Pertinent Lab Results:     Results from last 7 days   Lab Units 07/11/19  0532 07/10/19  2022   SODIUM mmol/L 140 138   POTASSIUM mmol/L 3.7 3.6   CHLORIDE mmol/L 106 103   CO2 mmol/L 28.0 28.0   BUN mg/dL 12 18   CREATININE mg/dL 0.70 0.70   CALCIUM mg/dL 8.8 9.6   BILIRUBIN mg/dL  --  0.4   ALK PHOS U/L  --  117   ALT (SGPT) U/L  --  36   AST (SGOT) U/L  --  34   GLUCOSE mg/dL 91 109*     Results from last 7 days   Lab Units 07/11/19  0532 07/10/19  2022   WBC 10*3/mm3 6.09 9.25   HEMOGLOBIN g/dL 12.8 14.3   HEMATOCRIT % 38.9 43.2   PLATELETS 10*3/mm3 207 228         Results from last 7 days   Lab Units 07/11/19  0847 07/11/19  0211 07/10/19  2022   TROPONIN I ng/mL <0.012 <0.012 <0.012     Results from last 7 days   Lab Units 07/10/19  2022   PROBNP pg/mL 46.5     Pertinent Radiology Results:    Imaging Results (all)     Procedure Component Value Units Date/Time    XR Chest 1 View [[id number]] Collected:  07/11/19 0740     Updated:  07/11/19 0742    Narrative:       PROCEDURE: XR CHEST 1 VW-     HISTORY: afib; I48.91-Unspecified atrial fibrillation     COMPARISON: None.     FINDINGS: The heart is normal in size. The mediastinum is unremarkable.  The lungs are clear. There is no pneumothorax.  There are no acute  osseous abnormalities.       Impression:       No acute cardiopulmonary process.     Continued followup is recommended.     This report was finalized on 7/11/2019 7:40 AM by Radha Singh M.D..        2D echocardiogram done on 7/11/2019 is reported as follows:    Technically limited study   1) Normal LV systolic function ( EF is over 55%)  2) Normal left atrial size with normal LVedp   3) Trace MR and TR with normal PA pressures   4) Normal size and function of the RV    Condition on Discharge:      Stable.    Code status during the hospital stay:    Code Status and Medical  Interventions:   Ordered at: 07/10/19 2210     Level Of Support Discussed With:    Patient     Code Status:    CPR     Medical Interventions (Level of Support Prior to Arrest):    Full     Discharge Disposition:    Home or Self Care    Discharge Medications:       Discharge Medications      New Medications      Instructions Start Date   aspirin 81 MG EC tablet   81 mg, Oral, Daily      atorvastatin 40 MG tablet  Commonly known as:  LIPITOR   40 mg, Oral, Nightly      metoprolol succinate XL 25 MG 24 hr tablet  Commonly known as:  TOPROL-XL   25 mg, Oral, Every 24 Hours Scheduled         Continue These Medications      Instructions Start Date   CALCIUM 1200 PO   1,200 mg, Oral      Vitamin D (Cholecalciferol) 400 units tablet  Commonly known as:  CHOLECALCIFEROL   400 Units, Oral, Daily           Discharge Diet:     Diet Instructions     Diet: Cardiac; Thin      Discharge Diet:  Cardiac    Fluid Consistency:  Thin        Activity at Discharge:     Activity Instructions     Activity as Tolerated          Follow-up Appointments:    Follow-up Information     Escobar Salguero MD. Schedule an appointment as soon as possible for a visit in 5 week(s).    Specialties:  Interventional Cardiology, Cardiology  Contact information:  [address]  [place] [id number]  [phone]             Deon Garcia MD Follow up in 1 week(s).    Specialty:  Internal Medicine  Contact information:  [address] [facility]  [address]  [place] [address]  [phone]                 Test Results Pending at Discharge:    None.     Jewel Suarez MD  07/12/19  7:49 AM    Time spent: 20 min.    Dictated utilizing Dragon dictation.      Electronically signed by Jewel Suarez MD at 7/12/2019  9:30 AM

## 2019-07-12 NOTE — PROGRESS NOTES
Continued Stay Note   [facility]     Patient Name: Diana Flores  MRN: [id number]  Today's Date: 7/12/2019    Admit Date: 7/10/2019    Discharge Plan     Row Name 07/12/19 0756       Plan    Plan  Discharge orders on chart.        Discharge Codes    No documentation.       Expected Discharge Date and Time     Expected Discharge Date Expected Discharge Time    Jul 12, 2019             Samantha Will

## 2019-07-12 NOTE — TELEPHONE ENCOUNTER
[facility]; 07/12/19; [facility] R[facility]; FRED WITH RBR    APPT TIME:  Monday, July 15th, 2019 @ 11:00am  W/ Marie Paredes

## 2019-07-12 NOTE — PROGRESS NOTES
"   LOS: 2 days   Patient Care Team:  Deon Garcia MD as PCP - General        Interval History:     Patient doing reasonable has no new symptoms at this time.  Wants to go home.    Review of Systems:   Pertinent items are noted in HPI.      OBJECTIVE:    Vital Sign Min/Max for last 24 hours  Temp  Min: 97.8 °F (36.6 °C)  Max: 98.5 °F (36.9 °C)   BP  Min: 103/61  Max: 122/65   Pulse  Min: 43  Max: 110   Resp  Min: 16  Max: 20   SpO2  Min: 95 %  Max: 100 %   No Data Recorded   Weight  Min: 68.6 kg (151 lb 3.2 oz)  Max: 68.6 kg (151 lb 3.2 oz)     Flowsheet Rows      First Filed Value   Admission Height  162.6 cm (64\") Documented at 07/10/2019 1955   Admission Weight  67.1 kg (148 lb) Documented at 07/10/2019 1955          Physical Exam:     General Appearance:    Alert, cooperative, in no acute distress   Head:    Normocephalic, without obvious abnormality, atraumatic   Eyes:            Lids and lashes normal, conjunctivae and sclerae normal, no   icterus, no pallor, corneas clear, PERRLA   Ears:    Ears appear intact with no abnormalities noted   Throat:   No oral lesions, no thrush, oral mucosa moist   Neck:   No adenopathy, supple, trachea midline, no thyromegaly, no     carotid bruit, no JVD   Back:     No kyphosis present, no scoliosis present, no skin lesions,       erythema or scars, no tenderness to percussion or                   palpation,   range of motion normal   Lungs:     Clear to auscultation,respirations regular, even and                   unlabored    Heart:    Regular rhythm and normal rate, normal S1 and S2, no            murmur, no gallop, no rub, no click   Breast Exam:    Deferred   Abdomen:     Normal bowel sounds, no masses, no organomegaly, soft        non-tender, non-distended, no guarding, no rebound                 tenderness   Genitalia:    Deferred   Extremities:   Moves all extremities well, no edema, no cyanosis, no              redness   Pulses:   Pulses palpable and equal " bilaterally   Skin:   No bleeding, bruising or rash   Lymph nodes:   No palpable adenopathy   Neurologic:   Cranial nerves 2 - 12 grossly intact, sensation intact, DTR        present and equal bilaterally       Telemetry:Average heart rate of 60 lowest heart rate of 28 no runs of SVT.  No prolonged pauses beyond 2 seconds seen.#4 dyslipidemia: LAB DATA :     Results from last 7 days   Lab Units 07/11/19  0532   CHOLESTEROL mg/dL 246*   TRIGLYCERIDES mg/dL 96   HDL CHOL mg/dL 72*   LDL CHOL mg/dL 155*       Laboratory results:    Results from last 7 days   Lab Units 07/11/19  0532 07/10/19  2022   SODIUM mmol/L 140 138   POTASSIUM mmol/L 3.7 3.6   CHLORIDE mmol/L 106 103   CO2 mmol/L 28.0 28.0   BUN mg/dL 12 18   CREATININE mg/dL 0.70 0.70   CALCIUM mg/dL 8.8 9.6   BILIRUBIN mg/dL  --  0.4   ALK PHOS U/L  --  117   ALT (SGPT) U/L  --  36   AST (SGOT) U/L  --  34   GLUCOSE mg/dL 91 109*     Results from last 7 days   Lab Units 07/11/19  0532 07/10/19  2022   WBC 10*3/mm3 6.09 9.25   HEMOGLOBIN g/dL 12.8 14.3   HEMATOCRIT % 38.9 43.2   PLATELETS 10*3/mm3 207 228                 Results from last 7 days   Lab Units 07/11/19  0847   TROPONIN I ng/mL <0.012                 Lab Results   Component Value Date    HGBA1C 5.7 07/11/2019     Results from last 7 days   Lab Units 07/11/19  0532   TSH mIU/mL 1.890   FREE T4 ng/dL 0.92           IMAGING DATA:     Xr Chest 1 View    Result Date: 7/11/2019  Narrative: PROCEDURE: XR CHEST 1 VW-  HISTORY: afib; I48.91-Unspecified atrial fibrillation  COMPARISON: None.  FINDINGS: The heart is normal in size. The mediastinum is unremarkable. The lungs are clear. There is no pneumothorax.  There are no acute osseous abnormalities.      Impression: No acute cardiopulmonary process.  Continued followup is recommended.  This report was finalized on 7/11/2019 7:40 AM by Radha Singh M.D..            ASSESSMENT PLAN:    #1 atrial fibrillation:  Status post spontaneous conversion has been  bradycardic overnight.  Will decrease the dose of the Toprol to 25 mg daily.  No underlying pathology identified thyroid function normal left atrial size normal mitral valve status normal.  No obvious evidence for acute coronary event.  On outpatient basis will try to arrange for a stress test.    2.  Anticoagulation therapy:  Chads 2 score of 1.  Continue aspirin therapy for now.    3.  Bradycardia:  Heart rates in the high 20s noted during the sleep hours.  Will decrease the beta-blocker to 25 mg daily.  Will follow this up on outpatient basis and correlate with symptoms.    #4 dyslipidemia:   Suggest that she stay on statin therapy at Lipitor 40 mg p.o. nightly.          Hyperlipidemia    Paroxysmal atrial fibrillation with RVR (CMS/HCC)        Escobar Salguero MD  07/12/19  7:10 AM

## 2019-07-12 NOTE — DISCHARGE SUMMARY
[facility]   DISCHARGE SUMMARY      Name:  Diana Flores   Age:  53 y.o.  Sex:  female  :  1965  MRN:  [id number]   Visit Number:  [id number]    Admission Date:  7/10/2019  Date of Discharge:  2019  Primary Care Physician:  Deon Garcia MD    Important issues to note:    1.  New medications started: Toprol-XL, aspirin, atorvastatin.  2.  Follow-up with Dr. Salguero in 4 to 5 weeks.    Discharge Diagnoses:     1.  Paroxysmal atrial fibrillation with rapid ventricular rate, present on admission.  2.  Osteopenia.  3.  Hyperlipidemia.      Hyperlipidemia    Paroxysmal atrial fibrillation with RVR (CMS/HCC)    Presenting Problem:    Atrial fibrillation with RVR (CMS/HCC) [I48.91]     Consults:     Consults     Date and Time Order Name Status Description    7/10/2019 2231 Inpatient Cardiology Consult          Consulting Physician(s)     Provider Relationship Specialty    Escobar Salguero MD Consulting Physician Interventional Cardiology        Procedures Performed:    None.    History of presenting illness/Hospital Course:    This is a 53-year-old female, relatively healthy except for osteopenia on calcium and vitamin D supplements was brought to the emergency room by her family with complaints of palpitations associated with dizziness and shortness of breath.  She was sitting on the couch and watching TV when this started.  She did not have any history of recent fever or cough.  No prior history of cardiac illness.  She was evaluated in the emergency room and was noted to have atrial fibrillation with rapid ventricular rate going up to 180 bpm.  She was given IV metoprolol and was subsequently started on Cardizem drip and was admitted to the medical floor.  She was also given a dose of therapeutic Lovenox.  Her initial troponin was negative.  Blood work including CMP and CBC were within normal range.  Hemoglobin A1c was 5.7.  TSH was within normal range at  1.89.    Patient was continued on Cardizem drip and did convert spontaneously to sinus rhythm overnight.  Her Cardizem drip was discontinued and she was seen by Dr. Salguero.  Dr. Salguero had initially planned on cardioversion but since she converted spontaneously this was canceled.  She did have a 2D echocardiogram on 7/11/2019 which showed normal left ventricular ejection fraction and fairly normal valve anatomy.  Her chads score was 1.  She was however noted to have occasional sinus bradycardia with a heart rate in the 40s.  Her Toprol dose was decreased from 50 mg to 25 mg by Dr. Salguero.  She was noted to have elevated cholesterol and was started on Lipitor per cardiology recommendations.  She does not meet criteria for anticoagulation and will be continued on low-dose aspirin therapy.  I have discussed the patient's condition with Dr. Salguero and he recommended outpatient follow-up in 4 to 5 weeks for further evaluation for coronary artery disease which may include a stress test if indicated.  Patient denies drinking excessive caffeine.  Her discharge condition and plan was discussed with her  who is at the bedside.  She is advised to follow-up with her primary care physician in 1 week.    Vital Signs:    Temp:  [97.8 °F (36.6 °C)-98.5 °F (36.9 °C)] 97.8 °F (36.6 °C)  Heart Rate:  [43-64] 54  Resp:  [16-20] 16  BP: (103-121)/(59-71) 114/64    Physical Exam:    General Appearance:  Alert and cooperative, not in any acute distress.   Head:  Atraumatic and normocephalic, without obvious abnormality.   Eyes:          PERRLA, conjunctivae and sclerae normal, no Icterus. No pallor. Extraocular movements are within normal limits.   Ears:  Ears appear intact with no abnormalities noted.   Throat: No oral lesions, no thrush, oral mucosa moist.   Neck: Supple, trachea midline, no thyromegaly, no carotid bruit.   Back:   No kyphoscoliosis present. No tenderness to palpation,   range of motion normal.   Lungs:   Chest shape  is normal. Breath sounds heard bilaterally equally.  No crackles or wheezing. No Pleural rub or bronchial breathing.   Heart:  Normal S1 and S2, no murmur, no gallop, no rub. No JVD.   Abdomen:   Normal bowel sounds, no masses, no organomegaly. Soft, non-tender, non-distended, no guarding, no rebound tenderness.   Extremities: Moves all extremities well, no edema, no cyanosis, no clubbing.   Pulses: Pulses palpable and equal bilaterally.   Skin: No bleeding, bruising or rash.   Neurologic: Alert and oriented x 3. Moves all four limbs equally. No tremors. No facial asymetry.     Pertinent Lab Results:     Results from last 7 days   Lab Units 07/11/19  0532 07/10/19  2022   SODIUM mmol/L 140 138   POTASSIUM mmol/L 3.7 3.6   CHLORIDE mmol/L 106 103   CO2 mmol/L 28.0 28.0   BUN mg/dL 12 18   CREATININE mg/dL 0.70 0.70   CALCIUM mg/dL 8.8 9.6   BILIRUBIN mg/dL  --  0.4   ALK PHOS U/L  --  117   ALT (SGPT) U/L  --  36   AST (SGOT) U/L  --  34   GLUCOSE mg/dL 91 109*     Results from last 7 days   Lab Units 07/11/19  0532 07/10/19  2022   WBC 10*3/mm3 6.09 9.25   HEMOGLOBIN g/dL 12.8 14.3   HEMATOCRIT % 38.9 43.2   PLATELETS 10*3/mm3 207 228         Results from last 7 days   Lab Units 07/11/19  0847 07/11/19  0211 07/10/19  2022   TROPONIN I ng/mL <0.012 <0.012 <0.012     Results from last 7 days   Lab Units 07/10/19  2022   PROBNP pg/mL 46.5     Pertinent Radiology Results:    Imaging Results (all)     Procedure Component Value Units Date/Time    XR Chest 1 View [[id number]] Collected:  07/11/19 0740     Updated:  07/11/19 0742    Narrative:       PROCEDURE: XR CHEST 1 VW-     HISTORY: afib; I48.91-Unspecified atrial fibrillation     COMPARISON: None.     FINDINGS: The heart is normal in size. The mediastinum is unremarkable.  The lungs are clear. There is no pneumothorax.  There are no acute  osseous abnormalities.       Impression:       No acute cardiopulmonary process.     Continued followup is recommended.     This  report was finalized on 7/11/2019 7:40 AM by Radha Singh M.D..        2D echocardiogram done on 7/11/2019 is reported as follows:    Technically limited study   1) Normal LV systolic function ( EF is over 55%)  2) Normal left atrial size with normal LVedp   3) Trace MR and TR with normal PA pressures   4) Normal size and function of the RV    Condition on Discharge:      Stable.    Code status during the hospital stay:    Code Status and Medical Interventions:   Ordered at: 07/10/19 2210     Level Of Support Discussed With:    Patient     Code Status:    CPR     Medical Interventions (Level of Support Prior to Arrest):    Full     Discharge Disposition:    Home or Self Care    Discharge Medications:       Discharge Medications      New Medications      Instructions Start Date   aspirin 81 MG EC tablet   81 mg, Oral, Daily      atorvastatin 40 MG tablet  Commonly known as:  LIPITOR   40 mg, Oral, Nightly      metoprolol succinate XL 25 MG 24 hr tablet  Commonly known as:  TOPROL-XL   25 mg, Oral, Every 24 Hours Scheduled         Continue These Medications      Instructions Start Date   CALCIUM 1200 PO   1,200 mg, Oral      Vitamin D (Cholecalciferol) 400 units tablet  Commonly known as:  CHOLECALCIFEROL   400 Units, Oral, Daily           Discharge Diet:     Diet Instructions     Diet: Cardiac; Thin      Discharge Diet:  Cardiac    Fluid Consistency:  Thin        Activity at Discharge:     Activity Instructions     Activity as Tolerated          Follow-up Appointments:    Follow-up Information     Escobar Salguero MD. Schedule an appointment as soon as possible for a visit in 5 week(s).    Specialties:  Interventional Cardiology, Cardiology  Contact information:  [address] [facility] [facility] [address] [facility] [address]  [facility] [address]  [phone]             Deon Garcia MD Follow up in 1 week(s).    Specialty:  Internal Medicine  Contact information:  [address] [facility] [address]  [facility]  [address]  [phone]                 Test Results Pending at Discharge:    None.     Jewel Suarez MD  07/12/19  7:49 AM    Time spent: 20 min.    Dictated utilizing Dragon dictation.

## 2019-07-12 NOTE — PLAN OF CARE
Problem: Patient Care Overview  Goal: Plan of Care Review  Outcome: Ongoing (interventions implemented as appropriate)   07/12/19 0573   Plan of Care Review   Progress improving   OTHER   Outcome Summary VSS. Patient denies pain. NSR/SB on telemetry.    Coping/Psychosocial   Plan of Care Reviewed With patient       Problem: Arrhythmia/Dysrhythmia (Symptomatic) (Adult)  Goal: Signs and Symptoms of Listed Potential Problems Will be Absent, Minimized or Managed (Arrhythmia/Dysrhythmia)  Outcome: Ongoing (interventions implemented as appropriate)   07/12/19 7547   Goal/Outcome Evaluation   Problems Assessed (Arrhythmia/Dysrhythmia) all   Problems Present (Dysrhythmia) none

## 2019-07-15 ENCOUNTER — TRANSITIONAL CARE MANAGEMENT TELEPHONE ENCOUNTER (OUTPATIENT)
Dept: INTERNAL MEDICINE | Facility: CLINIC | Age: 54
End: 2019-07-15

## 2019-07-15 ENCOUNTER — OFFICE VISIT (OUTPATIENT)
Dept: INTERNAL MEDICINE | Facility: CLINIC | Age: 54
End: 2019-07-15

## 2019-07-15 VITALS
DIASTOLIC BLOOD PRESSURE: 64 MMHG | HEIGHT: 64 IN | OXYGEN SATURATION: 98 % | RESPIRATION RATE: 18 BRPM | TEMPERATURE: 98.3 F | BODY MASS INDEX: 25.29 KG/M2 | WEIGHT: 148.12 LBS | HEART RATE: 54 BPM | SYSTOLIC BLOOD PRESSURE: 120 MMHG

## 2019-07-15 DIAGNOSIS — R73.03 PREDIABETES: ICD-10-CM

## 2019-07-15 DIAGNOSIS — I48.0 PAROXYSMAL ATRIAL FIBRILLATION WITH RVR (HCC): Primary | ICD-10-CM

## 2019-07-15 DIAGNOSIS — E78.00 PURE HYPERCHOLESTEROLEMIA: ICD-10-CM

## 2019-07-15 PROCEDURE — 99495 TRANSJ CARE MGMT MOD F2F 14D: CPT | Performed by: PHYSICIAN ASSISTANT

## 2019-07-15 RX ORDER — ATORVASTATIN CALCIUM 40 MG/1
40 TABLET, FILM COATED ORAL NIGHTLY
Qty: 30 TABLET | Refills: 5 | Status: SHIPPED | OUTPATIENT
Start: 2019-07-15 | End: 2020-01-13 | Stop reason: SDUPTHER

## 2019-07-15 RX ORDER — METOPROLOL SUCCINATE 25 MG/1
25 TABLET, EXTENDED RELEASE ORAL
Qty: 30 TABLET | Refills: 5 | Status: SHIPPED | OUTPATIENT
Start: 2019-07-15 | End: 2020-01-13 | Stop reason: SDUPTHER

## 2019-07-15 NOTE — PROGRESS NOTES
Transitional Care Follow Up Visit  Subjective     Diana Flores is a 53 y.o. female who presents for a transitional care management visit.    Within 48 business hours after discharge our office contacted her via telephone to coordinate her care and needs.      I reviewed and discussed the details of that call along with the discharge summary, hospital problems, inpatient lab results, inpatient diagnostic studies, and consultation reports with Diana.     Current outpatient and discharge medications have been reconciled for the patient.    Date of TCM Phone Call 7/15/2019   [facility]   Date of Discharge 7/12/2019   Discharge Disposition Home or Self Care     Risk for Readmission (LACE) Score: 5 (7/12/2019  6:00 AM)      History of Present Illness   Course During Hospital Stay:  This is a 53-year-old female, relatively healthy except for osteopenia on calcium and vitamin D supplements was brought to the emergency room by her family with complaints of palpitations associated with dizziness and shortness of breath.  She was sitting on the couch and watching TV when this started.  She did not have any history of recent fever or cough.  No prior history of cardiac illness.  She was evaluated in the emergency room and was noted to have atrial fibrillation with rapid ventricular rate going up to 180 bpm.  She was given IV metoprolol and was subsequently started on Cardizem drip and was admitted to the medical floor.  She was also given a dose of therapeutic Lovenox.  Her initial troponin was negative.  Blood work including CMP and CBC were within normal range.  Hemoglobin A1c was 5.7.  TSH was within normal range at 1.89.     Patient was continued on Cardizem drip and did convert spontaneously to sinus rhythm overnight.  Her Cardizem drip was discontinued and she was seen by Dr. Salguero.  Dr. Salguero had initially planned on cardioversion but since she converted spontaneously this was canceled.  She  did have a 2D echocardiogram on 7/11/2019 which showed normal left ventricular ejection fraction and fairly normal valve anatomy. Her chads score was 1.  She was however noted to have occasional sinus bradycardia with a heart rate in the 40s.  Her Toprol dose was decreased from 50 mg to 25 mg by Dr. Salguero.  She was noted to have elevated cholesterol and was started on Lipitor per cardiology recommendations.  She does not meet criteria for anticoagulation and will be continued on low-dose aspirin therapy.  I have discussed the patient's condition with Dr. Salguero and he recommended outpatient follow-up in 4 to 5 weeks for further evaluation for coronary artery disease which may include a stress test if indicated.  Patient denies drinking excessive caffeine.  Her discharge condition and plan was discussed with her  who is at the bedside.  She is advised to follow-up with her primary care physician in 1 week.       Following hospital discharge: Since discharge she denies chest pain, dyspnea, orthopnea, palpitations, lower extremity edema, headaches, weakness, visual disturbances or confusion. She is taking metoprolol 25 mg, Lipitor 40 mg and aspirin 81 mg daily and denies any appreciable side effects including fatigue, dizziness, lethargy or myalgias.  She will follow up with Dr. Salguero on 8/19.         The following portions of the patient's history were reviewed and updated as appropriate: allergies, current medications, past family history, past medical history, past social history, past surgical history and problem list.    Review of Systems   Constitutional: Negative for activity change, appetite change, chills, diaphoresis, fatigue, fever and unexpected weight change.   Eyes: Negative for visual disturbance.   Respiratory: Positive for shortness of breath (resolved). Negative for cough, chest tightness and wheezing.    Cardiovascular: Positive for palpitations (resolved). Negative for chest pain and leg  swelling.   Gastrointestinal: Negative.    Genitourinary: Negative.    Musculoskeletal: Negative.  Negative for myalgias.   Skin: Negative.    Neurological: Positive for dizziness (resolved). Negative for tremors, seizures, facial asymmetry, speech difficulty, weakness, light-headedness and headaches.   Hematological: Negative for adenopathy. Does not bruise/bleed easily.   Psychiatric/Behavioral: Negative for agitation, behavioral problems, confusion, dysphoric mood, self-injury and suicidal ideas. The patient is not nervous/anxious.        Objective   Physical Exam   Constitutional: She is oriented to person, place, and time. She appears well-developed and well-nourished. No distress.   HENT:   Head: Normocephalic and atraumatic.   Mouth/Throat: Oropharynx is clear and moist.   Eyes: Conjunctivae and EOM are normal. Pupils are equal, round, and reactive to light.   Neck: Normal range of motion. Neck supple.   Cardiovascular: Normal rate, regular rhythm and normal heart sounds. Exam reveals no gallop and no friction rub.   No murmur heard.  NSR.    Pulmonary/Chest: Effort normal and breath sounds normal. No stridor. No respiratory distress. She has no wheezes. She has no rales. She exhibits no tenderness.   Abdominal: Soft. Bowel sounds are normal. She exhibits no mass. There is no tenderness. No hernia.   Musculoskeletal: Normal range of motion. She exhibits no edema, tenderness or deformity.   Lymphadenopathy:     She has no cervical adenopathy.   Neurological: She is alert and oriented to person, place, and time. She displays normal reflexes. No cranial nerve deficit or sensory deficit. She exhibits normal muscle tone. Coordination normal.   Skin: Skin is warm and dry. Capillary refill takes less than 2 seconds. No rash noted. She is not diaphoretic.   Psychiatric: She has a normal mood and affect. Her behavior is normal. Judgment and thought content normal.   Nursing note and vitals  reviewed.      Assessment/Plan   Diana was seen today for follow-up.    Diagnoses and all orders for this visit:    Paroxysmal atrial fibrillation with RVR (CMS/HCC)  -     Continue: metoprolol succinate XL (TOPROL-XL) 25 MG 24 hr tablet; Take 1 tablet by mouth Daily.    Pure hypercholesterolemia  -     Continue: atorvastatin (LIPITOR) 40 MG tablet; Take 1 tablet by mouth Every Night.  Lipid panel reviewed.     Prediabetes  A1C 5.7 during admission. Discussed the need to reduce sugar and carbohydrate intake to prevent development of diabetes in the future.     BMI 25.0-25.9,adult  Patient's Body mass index is 25.41 kg/m². BMI is above normal parameters. Recommendations include: exercise counseling and nutrition counseling.        Current outpatient and discharge medications have been reconciled for the patient. Hospital admission record reviewed.   Reviewed by: VEENA Toney

## 2019-07-15 NOTE — OUTREACH NOTE
Patient was discharged from [facility] 7/12/19 and has a hospital follow up scheduled with PCP Marie CURIEL 7/15/19 which is within 2 business days of hospital discharge.

## 2019-10-17 ENCOUNTER — OFFICE VISIT (OUTPATIENT)
Dept: INTERNAL MEDICINE | Facility: CLINIC | Age: 54
End: 2019-10-17

## 2019-10-17 VITALS
SYSTOLIC BLOOD PRESSURE: 122 MMHG | BODY MASS INDEX: 25.78 KG/M2 | HEART RATE: 74 BPM | HEIGHT: 64 IN | OXYGEN SATURATION: 99 % | DIASTOLIC BLOOD PRESSURE: 68 MMHG | WEIGHT: 151 LBS | TEMPERATURE: 98.1 F

## 2019-10-17 DIAGNOSIS — I48.0 PAROXYSMAL ATRIAL FIBRILLATION WITH RVR (HCC): Primary | ICD-10-CM

## 2019-10-17 PROBLEM — M47.817 LUMBOSACRAL SPONDYLOSIS WITHOUT MYELOPATHY: Status: ACTIVE | Noted: 2019-08-09

## 2019-10-17 PROBLEM — M51.9 INTERVERTEBRAL DISC DISORDER: Status: ACTIVE | Noted: 2019-08-09

## 2019-10-17 PROCEDURE — 99213 OFFICE O/P EST LOW 20 MIN: CPT | Performed by: PHYSICIAN ASSISTANT

## 2019-10-17 RX ORDER — MELOXICAM 15 MG/1
15 TABLET ORAL DAILY PRN
COMMUNITY
End: 2020-09-11

## 2019-10-17 NOTE — PROGRESS NOTES
Diana Flores is a 53 y.o. female.     Subjective   History of Present Illness   Here today for 3-month follow-up of paroxysmal atrial fibrillation with RVR.  She has continued follow-up with Dr. Salguero, cardiologist, who has continued her on metoprolol XL 25 mg daily.  She does experience occasional palpitations which are tolerable and infrequent. Stress test with Dr. Salguero was normal.  She denies chest pain, dyspnea, orthopnea, lower extremity edema, headaches, weakness, visual disturbances or confusion. She is able to do all of her daily activities without difficulties.           The following portions of the patient's history were reviewed and updated as appropriate: allergies, current medications, past family history, past medical history, past social history, past surgical history and problem list.    Review of Systems   Constitutional: Negative for activity change, appetite change, chills, diaphoresis, fatigue, fever and unexpected weight change.   Eyes: Negative for visual disturbance.   Respiratory: Negative for cough, chest tightness, shortness of breath and wheezing.    Cardiovascular: Positive for palpitations. Negative for chest pain and leg swelling.   Gastrointestinal: Negative for anal bleeding, diarrhea, nausea and vomiting.   Endocrine: Negative for cold intolerance, heat intolerance, polydipsia, polyphagia and polyuria.   Musculoskeletal: Positive for back pain (chronic). Negative for myalgias.   Allergic/Immunologic: Negative for immunocompromised state.   Neurological: Negative for dizziness, tremors, seizures, weakness, numbness and headaches.   Hematological: Negative for adenopathy. Does not bruise/bleed easily.   Psychiatric/Behavioral: Negative for dysphoric mood, self-injury and suicidal ideas. The patient is not nervous/anxious.          Objective    Physical Exam   Constitutional: She is oriented to person, place, and time. She appears well-developed and well-nourished. No distress.  "  HENT:   Head: Normocephalic and atraumatic.   Eyes: Conjunctivae and EOM are normal. Pupils are equal, round, and reactive to light.   Neck: Normal range of motion. Neck supple.   Cardiovascular: Normal rate, regular rhythm and normal heart sounds. Exam reveals no gallop and no friction rub.   No murmur heard.  Pulmonary/Chest: Effort normal and breath sounds normal. No respiratory distress. She has no wheezes. She has no rales.   Abdominal: Soft. Bowel sounds are normal. There is no tenderness.   Musculoskeletal: Normal range of motion. She exhibits no edema, tenderness or deformity.   Neurological: She is alert and oriented to person, place, and time. She displays normal reflexes. No cranial nerve deficit or sensory deficit. She exhibits normal muscle tone. Coordination normal.   Skin: Skin is warm and dry. Capillary refill takes less than 2 seconds. No rash noted. She is not diaphoretic.   Psychiatric: She has a normal mood and affect. Her behavior is normal. Judgment and thought content normal.   Nursing note and vitals reviewed.        /68   Pulse 74   Temp 98.1 °F (36.7 °C)   Ht 162.6 cm (64.02\")   Wt 68.5 kg (151 lb)   SpO2 99%   BMI 25.91 kg/m²     Nursing note and vitals reviewed.          Assessment/Plan   Diana was seen today for follow-up.    Diagnoses and all orders for this visit:    Paroxysmal atrial fibrillation with RVR (CMS/HCC)    Well controlled with metoprolol XL 25 mg daily, continue.   Follow heart healthy/low salt diet.  Avoid processed foods.  Exercise as tolerated up to 30 minutes 5 days per week.      She declined immunizations today.     Return in 6 months for annual physical or sooner if needed.              "

## 2020-01-13 DIAGNOSIS — I48.0 PAROXYSMAL ATRIAL FIBRILLATION WITH RVR (HCC): ICD-10-CM

## 2020-01-13 DIAGNOSIS — E78.00 PURE HYPERCHOLESTEROLEMIA: ICD-10-CM

## 2020-01-13 RX ORDER — METOPROLOL SUCCINATE 25 MG/1
25 TABLET, EXTENDED RELEASE ORAL
Qty: 30 TABLET | Refills: 5 | Status: SHIPPED | OUTPATIENT
Start: 2020-01-13 | End: 2020-02-11 | Stop reason: SDUPTHER

## 2020-01-13 RX ORDER — ATORVASTATIN CALCIUM 40 MG/1
40 TABLET, FILM COATED ORAL NIGHTLY
Qty: 30 TABLET | Refills: 5 | Status: SHIPPED | OUTPATIENT
Start: 2020-01-13 | End: 2020-02-11 | Stop reason: SDUPTHER

## 2020-02-11 ENCOUNTER — TELEPHONE (OUTPATIENT)
Dept: INTERNAL MEDICINE | Facility: CLINIC | Age: 55
End: 2020-02-11

## 2020-02-11 DIAGNOSIS — I48.0 PAROXYSMAL ATRIAL FIBRILLATION WITH RVR (HCC): ICD-10-CM

## 2020-02-11 DIAGNOSIS — E78.00 PURE HYPERCHOLESTEROLEMIA: ICD-10-CM

## 2020-02-11 RX ORDER — ATORVASTATIN CALCIUM 40 MG/1
40 TABLET, FILM COATED ORAL NIGHTLY
Qty: 30 TABLET | Refills: 2 | Status: SHIPPED | OUTPATIENT
Start: 2020-02-11 | End: 2020-05-11

## 2020-02-11 RX ORDER — METOPROLOL SUCCINATE 25 MG/1
25 TABLET, EXTENDED RELEASE ORAL
Qty: 30 TABLET | Refills: 2 | Status: SHIPPED | OUTPATIENT
Start: 2020-02-11 | End: 2020-05-11

## 2020-02-11 NOTE — TELEPHONE ENCOUNTER
Patient is needing refills on 2 medications:     metoprolol succinate XL (TOPROL-XL) 25 MG 24 hr tablet    atorvastatin (LIPITOR) 40 MG tablet    Pt confirms the [facility] in [place]

## 2020-02-24 ENCOUNTER — APPOINTMENT (OUTPATIENT)
Dept: PREADMISSION TESTING | Facility: HOSPITAL | Age: 55
End: 2020-02-24

## 2020-02-24 VITALS
DIASTOLIC BLOOD PRESSURE: 73 MMHG | WEIGHT: 155 LBS | SYSTOLIC BLOOD PRESSURE: 126 MMHG | HEIGHT: 64 IN | OXYGEN SATURATION: 100 % | BODY MASS INDEX: 26.46 KG/M2 | HEART RATE: 55 BPM

## 2020-02-24 LAB
ANION GAP SERPL CALCULATED.3IONS-SCNC: 10.1 MMOL/L (ref 5–15)
BASOPHILS # BLD AUTO: 0.03 10*3/MM3 (ref 0–0.2)
BASOPHILS NFR BLD AUTO: 0.5 % (ref 0–1.5)
BUN BLD-MCNC: 23 MG/DL (ref 6–20)
BUN/CREAT SERPL: 32.9 (ref 7–25)
CALCIUM SPEC-SCNC: 10 MG/DL (ref 8.6–10.5)
CHLORIDE SERPL-SCNC: 104 MMOL/L (ref 98–107)
CO2 SERPL-SCNC: 26.9 MMOL/L (ref 22–29)
CREAT BLD-MCNC: 0.7 MG/DL (ref 0.57–1)
DEPRECATED RDW RBC AUTO: 39.3 FL (ref 37–54)
EOSINOPHIL # BLD AUTO: 0.14 10*3/MM3 (ref 0–0.4)
EOSINOPHIL NFR BLD AUTO: 2.3 % (ref 0.3–6.2)
ERYTHROCYTE [DISTWIDTH] IN BLOOD BY AUTOMATED COUNT: 11.9 % (ref 12.3–15.4)
GFR SERPL CREATININE-BSD FRML MDRD: 87 ML/MIN/1.73
GLUCOSE BLD-MCNC: 98 MG/DL (ref 65–99)
HCT VFR BLD AUTO: 42.1 % (ref 34–46.6)
HGB BLD-MCNC: 13.6 G/DL (ref 12–15.9)
IMM GRANULOCYTES # BLD AUTO: 0.02 10*3/MM3 (ref 0–0.05)
IMM GRANULOCYTES NFR BLD AUTO: 0.3 % (ref 0–0.5)
LYMPHOCYTES # BLD AUTO: 1.82 10*3/MM3 (ref 0.7–3.1)
LYMPHOCYTES NFR BLD AUTO: 29.4 % (ref 19.6–45.3)
MCH RBC QN AUTO: 29.2 PG (ref 26.6–33)
MCHC RBC AUTO-ENTMCNC: 32.3 G/DL (ref 31.5–35.7)
MCV RBC AUTO: 90.5 FL (ref 79–97)
MONOCYTES # BLD AUTO: 0.55 10*3/MM3 (ref 0.1–0.9)
MONOCYTES NFR BLD AUTO: 8.9 % (ref 5–12)
NEUTROPHILS # BLD AUTO: 3.62 10*3/MM3 (ref 1.7–7)
NEUTROPHILS NFR BLD AUTO: 58.6 % (ref 42.7–76)
NRBC BLD AUTO-RTO: 0 /100 WBC (ref 0–0.2)
PLATELET # BLD AUTO: 196 10*3/MM3 (ref 140–450)
PMV BLD AUTO: 11.4 FL (ref 6–12)
POTASSIUM BLD-SCNC: 4.2 MMOL/L (ref 3.5–5.2)
RBC # BLD AUTO: 4.65 10*6/MM3 (ref 3.77–5.28)
SODIUM BLD-SCNC: 141 MMOL/L (ref 136–145)
WBC NRBC COR # BLD: 6.18 10*3/MM3 (ref 3.4–10.8)

## 2020-02-24 PROCEDURE — 36415 COLL VENOUS BLD VENIPUNCTURE: CPT

## 2020-02-24 PROCEDURE — 93005 ELECTROCARDIOGRAM TRACING: CPT

## 2020-02-24 PROCEDURE — 85025 COMPLETE CBC W/AUTO DIFF WBC: CPT | Performed by: ORTHOPAEDIC SURGERY

## 2020-02-24 PROCEDURE — 80048 BASIC METABOLIC PNL TOTAL CA: CPT | Performed by: ORTHOPAEDIC SURGERY

## 2020-03-04 ENCOUNTER — ANESTHESIA EVENT (OUTPATIENT)
Dept: PERIOP | Facility: HOSPITAL | Age: 55
End: 2020-03-04

## 2020-03-04 ENCOUNTER — HOSPITAL ENCOUNTER (OUTPATIENT)
Facility: HOSPITAL | Age: 55
Setting detail: HOSPITAL OUTPATIENT SURGERY
Discharge: HOME | End: 2020-03-04
Attending: ORTHOPAEDIC SURGERY | Admitting: ORTHOPAEDIC SURGERY

## 2020-03-04 ENCOUNTER — ANESTHESIA (OUTPATIENT)
Dept: PERIOP | Facility: HOSPITAL | Age: 55
End: 2020-03-04

## 2020-03-04 VITALS
HEART RATE: 78 BPM | DIASTOLIC BLOOD PRESSURE: 65 MMHG | SYSTOLIC BLOOD PRESSURE: 135 MMHG | RESPIRATION RATE: 16 BRPM | OXYGEN SATURATION: 99 % | TEMPERATURE: 98 F

## 2020-03-04 PROCEDURE — 25010000002 FENTANYL CITRATE (PF) 100 MCG/2ML SOLUTION: Performed by: NURSE ANESTHETIST, CERTIFIED REGISTERED

## 2020-03-04 PROCEDURE — 25010000002 DEXAMETHASONE PER 1 MG: Performed by: NURSE ANESTHETIST, CERTIFIED REGISTERED

## 2020-03-04 PROCEDURE — 25010000002 KETOROLAC TROMETHAMINE PER 15 MG: Performed by: NURSE ANESTHETIST, CERTIFIED REGISTERED

## 2020-03-04 PROCEDURE — 25010000003 CEFAZOLIN SODIUM-DEXTROSE 1-4 GM-%(50ML) RECONSTITUTED SOLUTION: Performed by: ORTHOPAEDIC SURGERY

## 2020-03-04 PROCEDURE — 25010000002 MIDAZOLAM PER 1MG: Performed by: NURSE ANESTHETIST, CERTIFIED REGISTERED

## 2020-03-04 PROCEDURE — 25010000002 PROPOFOL 200 MG/20ML EMULSION: Performed by: NURSE ANESTHETIST, CERTIFIED REGISTERED

## 2020-03-04 PROCEDURE — 25010000002 MORPHINE PER 10 MG: Performed by: ORTHOPAEDIC SURGERY

## 2020-03-04 PROCEDURE — 25010000002 ONDANSETRON PER 1 MG: Performed by: NURSE ANESTHETIST, CERTIFIED REGISTERED

## 2020-03-04 RX ORDER — SODIUM CHLORIDE, SODIUM LACTATE, POTASSIUM CHLORIDE, CALCIUM CHLORIDE 600; 310; 30; 20 MG/100ML; MG/100ML; MG/100ML; MG/100ML
1000 INJECTION, SOLUTION INTRAVENOUS CONTINUOUS
Status: DISCONTINUED | OUTPATIENT
Start: 2020-03-04 | End: 2020-03-04 | Stop reason: HOSPADM

## 2020-03-04 RX ORDER — MEPERIDINE HYDROCHLORIDE 25 MG/ML
12.5 INJECTION INTRAMUSCULAR; INTRAVENOUS; SUBCUTANEOUS
Status: DISCONTINUED | OUTPATIENT
Start: 2020-03-04 | End: 2020-03-04 | Stop reason: HOSPADM

## 2020-03-04 RX ORDER — PROMETHAZINE HYDROCHLORIDE 25 MG/ML
6.25 INJECTION, SOLUTION INTRAMUSCULAR; INTRAVENOUS ONCE AS NEEDED
Status: DISCONTINUED | OUTPATIENT
Start: 2020-03-04 | End: 2020-03-04 | Stop reason: HOSPADM

## 2020-03-04 RX ORDER — CEFAZOLIN SODIUM 1 G/50ML
1 SOLUTION INTRAVENOUS
Status: COMPLETED | OUTPATIENT
Start: 2020-03-04 | End: 2020-03-04

## 2020-03-04 RX ORDER — PROMETHAZINE HYDROCHLORIDE 25 MG/1
25 TABLET ORAL ONCE AS NEEDED
Status: DISCONTINUED | OUTPATIENT
Start: 2020-03-04 | End: 2020-03-04 | Stop reason: HOSPADM

## 2020-03-04 RX ORDER — ONDANSETRON 2 MG/ML
INJECTION INTRAMUSCULAR; INTRAVENOUS AS NEEDED
Status: DISCONTINUED | OUTPATIENT
Start: 2020-03-04 | End: 2020-03-04 | Stop reason: SURG

## 2020-03-04 RX ORDER — IPRATROPIUM BROMIDE AND ALBUTEROL SULFATE 2.5; .5 MG/3ML; MG/3ML
3 SOLUTION RESPIRATORY (INHALATION) ONCE AS NEEDED
Status: DISCONTINUED | OUTPATIENT
Start: 2020-03-04 | End: 2020-03-04 | Stop reason: HOSPADM

## 2020-03-04 RX ORDER — DEXAMETHASONE SODIUM PHOSPHATE 4 MG/ML
INJECTION, SOLUTION INTRA-ARTICULAR; INTRALESIONAL; INTRAMUSCULAR; INTRAVENOUS; SOFT TISSUE AS NEEDED
Status: DISCONTINUED | OUTPATIENT
Start: 2020-03-04 | End: 2020-03-04 | Stop reason: SURG

## 2020-03-04 RX ORDER — MIDAZOLAM HYDROCHLORIDE 2 MG/2ML
INJECTION, SOLUTION INTRAMUSCULAR; INTRAVENOUS AS NEEDED
Status: DISCONTINUED | OUTPATIENT
Start: 2020-03-04 | End: 2020-03-04 | Stop reason: SURG

## 2020-03-04 RX ORDER — HYDROCODONE BITARTRATE AND ACETAMINOPHEN 7.5; 325 MG/1; MG/1
1-2 TABLET ORAL EVERY 4 HOURS PRN
Qty: 50 TABLET | Refills: 0 | Status: SHIPPED | OUTPATIENT
Start: 2020-03-04 | End: 2020-05-28

## 2020-03-04 RX ORDER — KETOROLAC TROMETHAMINE 30 MG/ML
INJECTION, SOLUTION INTRAMUSCULAR; INTRAVENOUS AS NEEDED
Status: DISCONTINUED | OUTPATIENT
Start: 2020-03-04 | End: 2020-03-04 | Stop reason: SURG

## 2020-03-04 RX ORDER — PROMETHAZINE HYDROCHLORIDE 25 MG/1
25 SUPPOSITORY RECTAL ONCE AS NEEDED
Status: DISCONTINUED | OUTPATIENT
Start: 2020-03-04 | End: 2020-03-04 | Stop reason: HOSPADM

## 2020-03-04 RX ORDER — LIDOCAINE HYDROCHLORIDE 20 MG/ML
INJECTION, SOLUTION INTRAVENOUS AS NEEDED
Status: DISCONTINUED | OUTPATIENT
Start: 2020-03-04 | End: 2020-03-04 | Stop reason: SURG

## 2020-03-04 RX ORDER — ONDANSETRON 2 MG/ML
4 INJECTION INTRAMUSCULAR; INTRAVENOUS ONCE AS NEEDED
Status: DISCONTINUED | OUTPATIENT
Start: 2020-03-04 | End: 2020-03-04 | Stop reason: HOSPADM

## 2020-03-04 RX ORDER — SODIUM CHLORIDE 0.9 % (FLUSH) 0.9 %
10 SYRINGE (ML) INJECTION AS NEEDED
Status: DISCONTINUED | OUTPATIENT
Start: 2020-03-04 | End: 2020-03-04 | Stop reason: HOSPADM

## 2020-03-04 RX ORDER — PROPOFOL 10 MG/ML
INJECTION, EMULSION INTRAVENOUS AS NEEDED
Status: DISCONTINUED | OUTPATIENT
Start: 2020-03-04 | End: 2020-03-04 | Stop reason: SURG

## 2020-03-04 RX ORDER — FENTANYL CITRATE 50 UG/ML
INJECTION, SOLUTION INTRAMUSCULAR; INTRAVENOUS AS NEEDED
Status: DISCONTINUED | OUTPATIENT
Start: 2020-03-04 | End: 2020-03-04 | Stop reason: SURG

## 2020-03-04 RX ADMIN — GLYCOPYRROLATE 0.2 MG: 0.2 INJECTION, SOLUTION INTRAMUSCULAR; INTRAVITREAL at 09:21

## 2020-03-04 RX ADMIN — FENTANYL CITRATE 50 MCG: 50 INJECTION INTRAMUSCULAR; INTRAVENOUS at 09:13

## 2020-03-04 RX ADMIN — SODIUM CHLORIDE, POTASSIUM CHLORIDE, SODIUM LACTATE AND CALCIUM CHLORIDE 1000 ML: 600; 310; 30; 20 INJECTION, SOLUTION INTRAVENOUS at 07:39

## 2020-03-04 RX ADMIN — DEXAMETHASONE SODIUM PHOSPHATE 4 MG: 4 INJECTION, SOLUTION INTRAMUSCULAR; INTRAVENOUS at 09:21

## 2020-03-04 RX ADMIN — ONDANSETRON 4 MG: 2 INJECTION INTRAMUSCULAR; INTRAVENOUS at 09:21

## 2020-03-04 RX ADMIN — SODIUM CHLORIDE, POTASSIUM CHLORIDE, SODIUM LACTATE AND CALCIUM CHLORIDE: 600; 310; 30; 20 INJECTION, SOLUTION INTRAVENOUS at 09:41

## 2020-03-04 RX ADMIN — PROPOFOL 200 MG: 10 INJECTION, EMULSION INTRAVENOUS at 09:17

## 2020-03-04 RX ADMIN — MIDAZOLAM HYDROCHLORIDE 2 MG: 1 INJECTION, SOLUTION INTRAMUSCULAR; INTRAVENOUS at 09:13

## 2020-03-04 RX ADMIN — LIDOCAINE HYDROCHLORIDE 60 MG: 20 INJECTION, SOLUTION INTRAVENOUS at 09:17

## 2020-03-04 RX ADMIN — FENTANYL CITRATE 50 MCG: 50 INJECTION INTRAMUSCULAR; INTRAVENOUS at 09:33

## 2020-03-04 RX ADMIN — CEFAZOLIN SODIUM 2 G: 1 SOLUTION INTRAVENOUS at 09:21

## 2020-03-04 RX ADMIN — KETOROLAC TROMETHAMINE 30 MG: 30 INJECTION, SOLUTION INTRAMUSCULAR at 09:21

## 2020-03-04 NOTE — DISCHARGE INSTRUCTIONS
Please follow all post op instructions and follow up appointment time from your physician's office included in your discharge packet.    Keep the affected extremity elevated above  level of the heart.  Use your ice pack as instructed, do not use continuously.  Use your crutches  as directed    Follow your physicians instructions as previously directed.    No pushing, pulling, tugging,  heavy lifting, or strenuous activity.  No major decision making, driving, or drinking alcoholic beverages for 24 hours. ( due to the medications you have  received)  Always use good hand hygiene/washing techniques.  NO driving while taking pain medications.    * if you have an incision:  Check your incision area every day for signs of infection.   Check for:  * more redness, swelling, or pain  *more fluid or blood  *warmth  *pus or bad smell    To assist you in voiding:  Drink plenty of fluids  Listen to running water while attempting to void.    If you are unable to urinate and you have an uncomfortable urge to void or it has been   6 hours since you were discharged, return to the Emergency Room

## 2020-03-04 NOTE — ANESTHESIA POSTPROCEDURE EVALUATION
Patient: Diana Flores    Procedure Summary     Date:  03/04/20 Room / Location:  [facility] OR  /  [facility]    Anesthesia Start:  0913 Anesthesia Stop:  0957    Procedure:  KNEE ARTHROSCOPY RIGHT WITH PARTIAL MEDIAL MENISCECTOMY (Right Knee) Diagnosis:       Effusion of right knee      (Effusion of right knee [M25.461])    Surgeon:  Dusty Dangelo MD Provider:  Christi Serrano CRNA    Anesthesia Type:  general ASA Status:  1          Anesthesia Type: general    Vitals  Vitals Value Taken Time   /64 3/4/2020 11:05 AM   Temp 97.6 °F (36.4 °C) 3/4/2020  9:53 AM   Pulse 53 3/4/2020 11:05 AM   Resp 13 3/4/2020 11:05 AM   SpO2 99 % 3/4/2020 11:05 AM           Post Anesthesia Care and Evaluation    Patient location during evaluation: PHASE II  Patient participation: complete - patient participated  Level of consciousness: awake and alert  Pain score: 0  Pain management: satisfactory to patient  Airway patency: patent  Anesthetic complications: No anesthetic complications  PONV Status: none  Cardiovascular status: acceptable and stable  Respiratory status: acceptable  Hydration status: acceptable

## 2020-03-04 NOTE — OP NOTE
[address],  [address]  [place]  [address]  [phone]    OPERATIVE REPORT      PATIENT NAME:  Diana Flores                            YOB: 1965         PREOP DIAGNOSIS:   Right  knee medial meniscal tear  POSTOP DIAGNOSIS:   Same.    PROCEDURE:   Right  knee diagnostic arthroscopy partial medial meniscectomy    SURGEON:     Bakari Dangelo MD    OPERATIVE TEAM:   Circulator: Sandy Dick RN; Sergei Moe RN  Scrub Person: Zack Landry; Darcie Fajardo    ANESTHETIST:  LILLIE: Christi Serrano CRNA    ANESTHESIA:   General    ESTIM BLOOD LOSS:   minimal    SPECIMENS:    None.    IMPLANTS:     None.    COMPLICATIONS:    None.    DISPOSITION:    Stable to recovery.    INDICATIONS:     Persistent Right  knee pain, swelling, stiffness, mechanical catching and dysfunction with clinical exam and imaging consistent with knee effusion, complex posterior horn medial meniscal tear and osteoarthritis.  Patient has failed all conservative management.  The option of elective knee arthroscopy evaluation and treatment discussed and the patient would like to proceed.  Risks, benefits and alternatives discussed and informed consent for surgical procedure obtained. Risks discussed including but not limited to anesthesia, infection and persistent or progressive knee joint symptoms.  Goals include the potential for pain relief, decreased swelling, improved mobility and function with the knee condition.  The patient presents for elective knee diagnostic arthroscopic evaluation and treatment.    Procedure:    Antibiotic prophylaxis was given.  Surgeon site marking and a time out were performed prior to the procedure.  Anesthesia was effective and well tolerated.  The knee and leg was prepped and draped in the usual sterile fashion.  Leg was exsanguinated with eschmarch tourniquet elevated to 300 mm mercury.    After sterile prep and drape a knee arthroscopy  was performed.    Evaluation of the medial side demonstrated 0, this was debrided with 4.0 shaver,  Evaluation of meniscus showed tear through the posterior horn medial meniscus radial type flap tear which was debrided with a shaver back to a stable rim.   The knotch showed intact acl and pcl.   Lateral side demonstrated 0, meniscus demonstrated no pathology.    Evaluation of patello femoral joint showed 0, there were no loose bodies in the gutters.    Representative arthroscopic photos were saved.  The knee joint was irrigated and suctioned clear.  The portal sites were closed and a sterile dressing was applied. Anesthesia was effective and well tolerated.  There were no complications of the procedure.  The patient was transferred in stable condition to recovery.    Dusty Dangelo MD   3/4/2020

## 2020-03-04 NOTE — ANESTHESIA PROCEDURE NOTES
Airway  Urgency: elective    Date/Time: 3/4/2020 9:19 AM    General Information and Staff    Patient location during procedure: OR  CRNA: Christi Serrano CRNA    Indications and Patient Condition    Preoxygenated: yes  Mask difficulty assessment: 1 - vent by mask    Final Airway Details  Final airway type: supraglottic airway      Successful airway: unique  Size 3  Airway Seal Pressure (cm H2O): 20    Number of attempts at approach: 1  Assessment: lips, teeth, and gum same as pre-op    Additional Comments  LMA seats well

## 2020-03-04 NOTE — ANESTHESIA PREPROCEDURE EVALUATION
Anesthesia Evaluation     Patient summary reviewed and Nursing notes reviewed   NPO Solid Status: > 8 hours  NPO Liquid Status: > 8 hours           Airway   Mallampati: I  TM distance: >3 FB  Neck ROM: full  no difficulty expected  Dental      Pulmonary - normal exam    breath sounds clear to auscultation  Cardiovascular - normal exam    Rhythm: regular  Rate: normal    (+) valvular problems/murmurs MVP,       Neuro/Psych  GI/Hepatic/Renal/Endo      Musculoskeletal     Abdominal    Substance History      OB/GYN          Other                          Anesthesia Plan    ASA 1     general     intravenous induction     Anesthetic plan, all risks, benefits, and alternatives have been provided, discussed and informed consent has been obtained with: patient.

## 2020-05-10 DIAGNOSIS — E78.00 PURE HYPERCHOLESTEROLEMIA: ICD-10-CM

## 2020-05-11 DIAGNOSIS — E78.00 PURE HYPERCHOLESTEROLEMIA: ICD-10-CM

## 2020-05-11 DIAGNOSIS — I48.0 PAROXYSMAL ATRIAL FIBRILLATION WITH RVR (HCC): ICD-10-CM

## 2020-05-11 RX ORDER — ATORVASTATIN CALCIUM 40 MG/1
TABLET, FILM COATED ORAL
Qty: 90 TABLET | Refills: 0 | Status: SHIPPED | OUTPATIENT
Start: 2020-05-11 | End: 2020-09-10 | Stop reason: SDUPTHER

## 2020-05-11 RX ORDER — ATORVASTATIN CALCIUM 40 MG/1
TABLET, FILM COATED ORAL
Qty: 90 TABLET | Refills: 0 | Status: SHIPPED | OUTPATIENT
Start: 2020-05-11 | End: 2020-05-11

## 2020-05-11 RX ORDER — METOPROLOL SUCCINATE 25 MG/1
TABLET, EXTENDED RELEASE ORAL
Qty: 90 TABLET | Refills: 0 | Status: SHIPPED | OUTPATIENT
Start: 2020-05-11 | End: 2020-08-18 | Stop reason: SDUPTHER

## 2020-05-28 ENCOUNTER — OFFICE VISIT (OUTPATIENT)
Dept: INTERNAL MEDICINE | Facility: CLINIC | Age: 55
End: 2020-05-28

## 2020-05-28 VITALS
BODY MASS INDEX: 26.63 KG/M2 | HEIGHT: 64 IN | HEART RATE: 69 BPM | SYSTOLIC BLOOD PRESSURE: 122 MMHG | WEIGHT: 156 LBS | OXYGEN SATURATION: 98 % | TEMPERATURE: 97.8 F | DIASTOLIC BLOOD PRESSURE: 70 MMHG

## 2020-05-28 DIAGNOSIS — Z00.00 ANNUAL PHYSICAL EXAM: Primary | ICD-10-CM

## 2020-05-28 DIAGNOSIS — Z11.59 NEED FOR HEPATITIS C SCREENING TEST: ICD-10-CM

## 2020-05-28 DIAGNOSIS — Z12.31 SCREENING MAMMOGRAM, ENCOUNTER FOR: ICD-10-CM

## 2020-05-28 DIAGNOSIS — E78.00 PURE HYPERCHOLESTEROLEMIA: ICD-10-CM

## 2020-05-28 DIAGNOSIS — I48.0 PAROXYSMAL ATRIAL FIBRILLATION WITH RVR (HCC): ICD-10-CM

## 2020-05-28 DIAGNOSIS — R73.03 PREDIABETES: ICD-10-CM

## 2020-05-28 PROBLEM — M85.89 OSTEOPENIA OF MULTIPLE SITES: Status: ACTIVE | Noted: 2020-05-28

## 2020-05-28 PROCEDURE — 99396 PREV VISIT EST AGE 40-64: CPT | Performed by: PHYSICIAN ASSISTANT

## 2020-05-28 RX ORDER — METHYLPREDNISOLONE 4 MG/1
TABLET ORAL
COMMUNITY
Start: 2020-05-22 | End: 2020-09-11

## 2020-05-28 NOTE — PROGRESS NOTES
Subjective   Diana Flores is a 54 y.o. female and is here for a comprehensive physical exam. The patient reports problems - wrist.    HPI: Atrial fibrillation managed with metoprolol. Patient denies chest pain, dyspnea, orthopnea, lower extremity edema, headaches, weakness, visual disturbances or confusion. Occasional leg cramps.     Tendonitis in left wrist which is currently being treated with a steroid taper by her orthopedic surgeon. The steroid may have caused some increased palpitations so she is spreading the taper out into lower doses.         Health Habits:  Eye exam within last 2 years? yes  Dental exam every 6 months? overdue  Exercise habits: walking  Healthy diet? Attempts to keep a healthy diet    The 10-year ASCVD risk score (Howard Lakemarilynn IRENE Jr., et al., 2013) is: 1.6%    Values used to calculate the score:      Age: 54 years      Sex: Female      Is Non- : No      Diabetic: No      Tobacco smoker: No      Systolic Blood Pressure: 122 mmHg      Is BP treated: No      HDL Cholesterol: 72 mg/dL      Total Cholesterol: 246 mg/dL        Do you take any herbs or supplements that were not prescribed by a doctor? yes, calcium and vitamin D  Are you taking calcium supplements? Yes  Are you taking aspirin daily? Yes     History:  LMP: No LMP recorded. Patient has had a hysterectomy.  Menopause: Yes  Last pap date: 5/6/19  Abnormal pap? no  Family history of breast or ovarian cancer: no         OB History   No data available     Sexual activity questions deferred to the physician.        The following portions of the patient's history were reviewed and updated as appropriate: She  has a past medical history of Arthritis, Diverticulosis (03/2017), Hyperlipidemia, Influenza A (01/29/2019), Intermittent palpitations, Lower back pain, Lumbar spondylosis, MVP (mitral valve prolapse), Osteopenia, Paroxysmal atrial fibrillation with RVR (CMS/HCC) (07/10/2019), Prediabetes, Sinus  bradycardia, Snores, and Wears glasses.  She does not have any pertinent problems on file.  She  has a past surgical history that includes Subtotal Hysterectomy (2003);  section; Trigger finger release (Right); Colonoscopy (N/A, 2017); Knee Arthroscopy (Left, 2017); and Knee Arthroscopy (Right, 3/4/2020).  Her family history includes Pancreatic cancer in her mother.  She  reports that she has never smoked. She has never used smokeless tobacco. She reports that she drinks alcohol. She reports that she does not use drugs.  Current Outpatient Medications   Medication Sig Dispense Refill   • aspirin 81 MG EC tablet Take 1 tablet by mouth Daily. 30 tablet 0   • atorvastatin (LIPITOR) 40 MG tablet TAKE 1 TABLET BY MOUTH ONE TIME A DAY AT NIGHT 90 tablet 0   • Calcium Carbonate-Vit D-Min (CALCIUM 1200 PO) Take 1,200 mg by mouth.     • meloxicam (MOBIC) 15 MG tablet Take 15 mg by mouth Daily As Needed for Mild Pain .     • methylPREDNISolone (MEDROL, NACHO,) 4 MG tablet      • metoprolol succinate XL (TOPROL-XL) 25 MG 24 hr tablet TAKE 1 TABLET BY MOUTH ONE TIME A DAY  90 tablet 0   • Vitamin D, Cholecalciferol, (CHOLECALCIFEROL) 400 units tablet Take 400 Units by mouth Daily.       No current facility-administered medications for this visit.        Review of Systems  Do you have pain that bothers you in your daily life? no    Review of Systems   Constitutional: Negative for appetite change, chills, fatigue, fever and unexpected weight change.   HENT: Negative for congestion, ear pain, hearing loss, nosebleeds, postnasal drip, rhinorrhea, sore throat, tinnitus and trouble swallowing.    Eyes: Negative for photophobia, discharge and visual disturbance.   Respiratory: Negative for cough, chest tightness, shortness of breath and wheezing.    Cardiovascular: Positive for palpitations. Negative for chest pain and leg swelling.   Gastrointestinal: Negative for abdominal distention, abdominal pain, blood in  "stool, constipation, diarrhea, nausea and vomiting.   Endocrine: Negative for cold intolerance, heat intolerance, polydipsia, polyphagia and polyuria.   Genitourinary: Negative.    Musculoskeletal: Positive for arthralgias (left wrist), back pain and myalgias (leg). Negative for joint swelling, neck pain and neck stiffness.   Skin: Negative for color change, pallor, rash and wound.   Allergic/Immunologic: Negative for environmental allergies, food allergies and immunocompromised state.   Neurological: Negative for dizziness, tremors, seizures, weakness, numbness and headaches.   Hematological: Negative for adenopathy. Does not bruise/bleed easily.   Psychiatric/Behavioral: Negative for agitation, behavioral problems, confusion, dysphoric mood, hallucinations, self-injury and suicidal ideas. The patient is not nervous/anxious.          Objective   /70   Pulse 69   Temp 97.8 °F (36.6 °C) (Temporal)   Ht 162.6 cm (64\")   Wt 70.8 kg (156 lb)   SpO2 98%   BMI 26.78 kg/m²     Physical Exam   Constitutional: She is oriented to person, place, and time. She appears well-developed and well-nourished. No distress.   HENT:   Head: Normocephalic and atraumatic.   Right Ear: External ear normal.   Left Ear: External ear normal.   Eyes: Pupils are equal, round, and reactive to light. Conjunctivae and EOM are normal. No scleral icterus.   Neck: Normal range of motion. Neck supple. No thyromegaly present.   Cardiovascular: Normal rate, regular rhythm and normal heart sounds. Exam reveals no gallop and no friction rub.   No murmur heard.  Pulmonary/Chest: Effort normal and breath sounds normal. No respiratory distress. She has no wheezes. She has no rales. She exhibits no tenderness.   Abdominal: Soft. Bowel sounds are normal. She exhibits no distension and no mass. There is no tenderness. There is no rebound.   Musculoskeletal: Normal range of motion. She exhibits no edema or deformity.        Left wrist: She exhibits " tenderness. She exhibits normal range of motion, no bony tenderness, no crepitus, no deformity and no laceration.   Lymphadenopathy:     She has no cervical adenopathy.   Neurological: She is alert and oriented to person, place, and time. She displays normal reflexes. No cranial nerve deficit or sensory deficit.   Skin: Skin is warm and dry. Capillary refill takes less than 2 seconds. No rash noted. She is not diaphoretic. No pallor.   Psychiatric: She has a normal mood and affect. Her behavior is normal. Judgment and thought content normal.   Nursing note and vitals reviewed.         Assessment/Plan   Healthy female exam.     1.    Diagnosis Plan   1. Annual physical exam     2. BMI 26.0-26.9,adult  Patient's Body mass index is 26.78 kg/m². BMI is above normal parameters. Recommendations include: educational material, exercise counseling and nutrition counseling.     3. Prediabetes  Hemoglobin A1c    Provided with educational material regarding diabetes prevention.     4. Need for hepatitis C screening test  Hepatitis C Antibody     5. Paroxysmal atrial fibrillation with RVR (CMS/HCC)  Sinus rhythm today. Continue metoprolol and aspirin daily. Continue cardiology follow-up visits.     6. Pure hypercholesterolemia  Lipid Panel    Comprehensive Metabolic Panel    CK     7. Screening mammogram, encounter for  Mammo Screening Digital Tomosynthesis Bilateral With CAD         2. Patient Counseling:  --Nutrition: Stressed importance of moderation in sodium/caffeine intake, saturated fat and cholesterol, caloric balance, sufficient intake of fresh fruits, vegetables, fiber, calcium, iron.  --Discussed the issue of calcium supplement, and the daily use of baby aspirin if applicable.             --Mammogram recommended every 2 years from age 40-49 and yearly beginning at age 50.  --Exercise: Stressed the importance of regular exercise.   --Substance Abuse: Discussed cessation/primary prevention of tobacco (if applicable),  alcohol, or other drug use (if applicable); driving or other dangerous activities under the influence; availability of treatment for abuse.    --Sexuality: Discussed sexually transmitted diseases, partner selection, use of condoms, avoidance of unintended pregnancy  and contraceptive alternatives.   --Injury prevention: Discussed safety belts, safety helmets, smoke detector, smoking near bedding or upholstery.   --Dental health: Discussed importance of regular tooth brushing, flossing, and dental visits every 6 months.  --Immunizations reviewed.  --Discussed benefits of screening colonoscopy (if applicable).  --After hours service discussed with patient.    3. Discussed the patient's BMI with her.  The BMI is above average; BMI management plan is completed  4. Return in about 1 year (around 5/28/2021) for Annual physical.         VEENA Toney  05/28/2020  9:29 AM

## 2020-05-28 NOTE — PATIENT INSTRUCTIONS
Dyslipidemia  Dyslipidemia is an imbalance of waxy, fat-like substances (lipids) in the blood. The body needs lipids in small amounts. Dyslipidemia often involves a high level of cholesterol or triglycerides, which are types of lipids.  Common forms of dyslipidemia include:  · High levels of LDL cholesterol. LDL is the type of cholesterol that causes fatty deposits (plaques) to build up in the blood vessels that carry blood away from your heart (arteries).  · Low levels of HDL cholesterol. HDL cholesterol is the type of cholesterol that protects against heart disease. High levels of HDL remove the LDL buildup from arteries.  · High levels of triglycerides. Triglycerides are a fatty substance in the blood that is linked to a buildup of plaques in the arteries.  What are the causes?  Primary dyslipidemia is caused by changes (mutations) in genes that are passed down through families (inherited). These mutations cause several types of dyslipidemia.  Secondary dyslipidemia is caused by lifestyle choices and diseases that lead to dyslipidemia, such as:  · Eating a diet that is high in animal fat.  · Not getting enough exercise.  · Having diabetes, kidney disease, liver disease, or thyroid disease.  · Drinking large amounts of alcohol.  · Using certain medicines.  What increases the risk?  You are more likely to develop this condition if you are an older man or if you are a woman who has gone through menopause. Other risk factors include:  · Having a family history of dyslipidemia.  · Taking certain medicines, including birth control pills, steroids, some diuretics, and beta-blockers.  · Smoking cigarettes.  · Eating a high-fat diet.  · Having certain medical conditions such as diabetes, polycystic ovary syndrome (PCOS), kidney disease, liver disease, or hypothyroidism.  · Not exercising regularly.  · Being overweight or obese with too much belly fat.  What are the signs or symptoms?  In most cases, dyslipidemia does not  usually cause any symptoms.  In severe cases, very high lipid levels can cause:  · Fatty bumps under the skin (xanthomas).  · White or gray ring around the black center (pupil) of the eye.  Very high triglyceride levels can cause inflammation of the pancreas (pancreatitis).  How is this diagnosed?  Your health care provider may diagnose dyslipidemia based on a routine blood test (fasting blood test). Because most people do not have symptoms of the condition, this blood testing (lipid profile) is done on adults age 20 and older and is repeated every 5 years. This test checks:  · Total cholesterol. This measures the total amount of cholesterol in your blood, including LDL cholesterol, HDL cholesterol, and triglycerides. A healthy number is below 200.  · LDL cholesterol. The target number for LDL cholesterol is different for each person, depending on individual risk factors. Ask your health care provider what your LDL cholesterol should be.  · HDL cholesterol. An HDL level of 60 or higher is best because it helps to protect against heart disease. A number below 40 for men or below 50 for women increases the risk for heart disease.  · Triglycerides. A healthy triglyceride number is below 150.  If your lipid profile is abnormal, your health care provider may do other blood tests.  How is this treated?  Treatment depends on the type of dyslipidemia that you have and your other risk factors for heart disease and stroke. Your health care provider will have a target range for your lipid levels based on this information.  For many people, this condition may be treated by lifestyle changes, such as diet and exercise. Your health care provider may recommend that you:  · Get regular exercise.  · Make changes to your diet.  · Quit smoking if you smoke.  If diet changes and exercise do not help you reach your goals, your health care provider may also prescribe medicine to lower lipids. The most commonly prescribed type of medicine  lowers your LDL cholesterol (statin drug). If you have a high triglyceride level, your provider may prescribe another type of drug (fibrate) or an omega-3 fish oil supplement, or both.  Follow these instructions at home:    Eating and drinking  · Follow instructions from your health care provider or dietitian about eating or drinking restrictions.  · Eat a healthy diet as told by your health care provider. This can help you reach and maintain a healthy weight, lower your LDL cholesterol, and raise your HDL cholesterol. This may include:  ? Limiting your calories, if you are overweight.  ? Eating more fruits, vegetables, whole grains, fish, and lean meats.  ? Limiting saturated fat, trans fat, and cholesterol.  · If you drink alcohol:  ? Limit how much you use.  ? Be aware of how much alcohol is in your drink. In the [place] one drink equals one 12 oz bottle of beer (355 mL), one 5 oz glass of wine (148 mL), or one 1½ oz glass of hard liquor (44 mL).  · Do not drink alcohol if:  ? Your health care provider tells you not to drink.  ? You are pregnant, may be pregnant, or are planning to become pregnant.  Activity  · Get regular exercise. Start an exercise and strength training program as told by your health care provider. Ask your health care provider what activities are safe for you. Your health care provider may recommend:  ? 30 minutes of aerobic activity 4-6 days a week. Brisk walking is an example of aerobic activity.  ? Strength training 2 days a week.  General instructions  · Do not use any products that contain nicotine or tobacco, such as cigarettes, e-cigarettes, and chewing tobacco. If you need help quitting, ask your health care provider.  · Take over-the-counter and prescription medicines only as told by your health care provider. This includes supplements.  · Keep all follow-up visits as told by your health care provider.  Contact a health care provider if:  · You are:  ? Having trouble sticking to your  exercise or diet plan.  ? Struggling to quit smoking or control your use of alcohol.  Summary  · Dyslipidemia often involves a high level of cholesterol or triglycerides, which are types of lipids.  · Treatment depends on the type of dyslipidemia that you have and your other risk factors for heart disease and stroke.  · For many people, treatment starts with lifestyle changes, such as diet and exercise.  · Your health care provider may prescribe medicine to lower lipids.  This information is not intended to replace advice given to you by your health care provider. Make sure you discuss any questions you have with your health care provider.  Document Released: 12/23/2014 Document Revised: 08/12/2019 Document Reviewed: 07/19/2019  [employer] Patient Education © 2020 [employer]    High Cholesterol    High cholesterol is a condition in which the blood has high levels of a white, waxy, fat-like substance (cholesterol). The human body needs small amounts of cholesterol. The liver makes all the cholesterol that the body needs. Extra (excess) cholesterol comes from the food that we eat.  Cholesterol is carried from the liver by the blood through the blood vessels. If you have high cholesterol, deposits (plaques) may build up on the walls of your blood vessels (arteries). Plaques make the arteries narrower and stiffer. Cholesterol plaques increase your risk for heart attack and stroke. Work with your health care provider to keep your cholesterol levels in a healthy range.  What increases the risk?  This condition is more likely to develop in people who:  · Eat foods that are high in animal fat (saturated fat) or cholesterol.  · Are overweight.  · Are not getting enough exercise.  · Have a family history of high cholesterol.  What are the signs or symptoms?  There are no symptoms of this condition.  How is this diagnosed?  This condition may be diagnosed from the results of a blood test.  · If you are older than age 20, your  "health care provider may check your cholesterol every 4-6 years.  · You may be checked more often if you already have high cholesterol or other risk factors for heart disease.  The blood test for cholesterol measures:  · \"Bad\" cholesterol (LDL cholesterol). This is the main type of cholesterol that causes heart disease. The desired level for LDL is less than 100.  · \"Good\" cholesterol (HDL cholesterol). This type helps to protect against heart disease by cleaning the arteries and carrying the LDL away. The desired level for HDL is 60 or higher.  · Triglycerides. These are fats that the body can store or burn for energy. The desired number for triglycerides is lower than 150.  · Total cholesterol. This is a measure of the total amount of cholesterol in your blood, including LDL cholesterol, HDL cholesterol, and triglycerides. A healthy number is less than 200.  How is this treated?  This condition is treated with diet changes, lifestyle changes, and medicines.  Diet changes  · This may include eating more whole grains, fruits, vegetables, nuts, and fish.  · This may also include cutting back on red meat and foods that have a lot of added sugar.  Lifestyle changes  · Changes may include getting at least 40 minutes of aerobic exercise 3 times a week. Aerobic exercises include walking, biking, and swimming. Aerobic exercise along with a healthy diet can help you maintain a healthy weight.  · Changes may also include quitting smoking.  Medicines  · Medicines are usually given if diet and lifestyle changes have failed to reduce your cholesterol to healthy levels.  · Your health care provider may prescribe a statin medicine. Statin medicines have been shown to reduce cholesterol, which can reduce the risk of heart disease.  Follow these instructions at home:  Eating and drinking  If told by your health care provider:  · Eat chicken (without skin), fish, veal, shellfish, ground turkey breast, and round or loin cuts of red " meat.  · Do not eat fried foods or fatty meats, such as hot dogs and salami.  · Eat plenty of fruits, such as apples.  · Eat plenty of vegetables, such as broccoli, potatoes, and carrots.  · Eat beans, peas, and lentils.  · Eat grains such as barley, rice, couscous, and bulgur wheat.  · Eat pasta without cream sauces.  · Use skim or nonfat milk, and eat low-fat or nonfat yogurt and cheeses.  · Do not eat or drink whole milk, cream, ice cream, egg yolks, or hard cheeses.  · Do not eat stick margarine or tub margarines that contain trans fats (also called partially hydrogenated oils).  · Do not eat saturated tropical oils, such as coconut oil and palm oil.  · Do not eat cakes, cookies, crackers, or other baked goods that contain trans fats.    General instructions  · Exercise as directed by your health care provider. Increase your activity level with activities such as gardening, walking, and taking the stairs.  · Take over-the-counter and prescription medicines only as told by your health care provider.  · Do not use any products that contain nicotine or tobacco, such as cigarettes and e-cigarettes. If you need help quitting, ask your health care provider.  · Keep all follow-up visits as told by your health care provider. This is important.  Contact a health care provider if:  · You are struggling to maintain a healthy diet or weight.  · You need help to start on an exercise program.  · You need help to stop smoking.  Get help right away if:  · You have chest pain.  · You have trouble breathing.  This information is not intended to replace advice given to you by your health care provider. Make sure you discuss any questions you have with your health care provider.  Document Released: 12/18/2006 Document Revised: 12/21/2018 Document Reviewed: 06/17/2017  [employer] Patient Education © 2020 [employer]    Heart-Healthy Eating Plan  Many factors influence your heart (coronary) health, including eating and exercise  habits. Coronary risk increases with abnormal blood fat (lipid) levels. Heart-healthy meal planning includes limiting unhealthy fats, increasing healthy fats, and making other diet and lifestyle changes.  What is my plan?  Your health care provider may recommend that you:  · Limit your fat intake to _________% or less of your total calories each day.  · Limit your saturated fat intake to _________% or less of your total calories each day.  · Limit the amount of cholesterol in your diet to less than _________ mg per day.  What are tips for following this plan?  Cooking  Cook foods using methods other than frying. Baking, boiling, grilling, and broiling are all good options. Other ways to reduce fat include:  · Removing the skin from poultry.  · Removing all visible fats from meats.  · Steaming vegetables in water or broth.  Meal planning    · At meals, imagine dividing your plate into fourths:  ? Fill one-half of your plate with vegetables and green salads.  ? Fill one-fourth of your plate with whole grains.  ? Fill one-fourth of your plate with lean protein foods.  · Eat 4-5 servings of vegetables per day. One serving equals 1 cup raw or cooked vegetable, or 2 cups raw leafy greens.  · Eat 4-5 servings of fruit per day. One serving equals 1 medium whole fruit, ¼ cup dried fruit, ½ cup fresh, frozen, or canned fruit, or ½ cup 100% fruit juice.  · Eat more foods that contain soluble fiber. Examples include apples, broccoli, carrots, beans, peas, and barley. Aim to get 25-30 g of fiber per day.  · Increase your consumption of legumes, nuts, and seeds to 4-5 servings per week. One serving of dried beans or legumes equals ½ cup cooked, 1 serving of nuts is ¼ cup, and 1 serving of seeds equals 1 tablespoon.  Fats  · Choose healthy fats more often. Choose monounsaturated and polyunsaturated fats, such as olive and canola oils, flaxseeds, walnuts, almonds, and seeds.  · Eat more omega-3 fats. Choose salmon, mackerel,  sardines, tuna, flaxseed oil, and ground flaxseeds. Aim to eat fish at least 2 times each week.  · Check food labels carefully to identify foods with trans fats or high amounts of saturated fat.  · Limit saturated fats. These are found in animal products, such as meats, butter, and cream. Plant sources of saturated fats include palm oil, palm kernel oil, and coconut oil.  · Avoid foods with partially hydrogenated oils in them. These contain trans fats. Examples are stick margarine, some tub margarines, cookies, crackers, and other baked goods.  · Avoid fried foods.  General information  · Eat more home-cooked food and less restaurant, buffet, and fast food.  · Limit or avoid alcohol.  · Limit foods that are high in starch and sugar.  · Lose weight if you are overweight. Losing just 5-10% of your body weight can help your overall health and prevent diseases such as diabetes and heart disease.  · Monitor your salt (sodium) intake, especially if you have high blood pressure. Talk with your health care provider about your sodium intake.  · Try to incorporate more vegetarian meals weekly.  What foods can I eat?  Fruits  All fresh, canned (in natural juice), or frozen fruits.  Vegetables  Fresh or frozen vegetables (raw, steamed, roasted, or grilled). Green salads.  Grains  Most grains. Choose whole wheat and whole grains most of the time. Rice and pasta, including brown rice and pastas made with whole wheat.  Meats and other proteins  Lean, well-trimmed beef, veal, pork, and lamb. Chicken and turkey without skin. All fish and shellfish. Wild duck, rabbit, pheasant, and venison. Egg whites or low-cholesterol egg substitutes. Dried beans, peas, lentils, and tofu. Seeds and most nuts.  Dairy  Low-fat or nonfat cheeses, including ricotta and mozzarella. Skim or 1% milk (liquid, powdered, or evaporated). Buttermilk made with low-fat milk. Nonfat or low-fat yogurt.  Fats and oils  Non-hydrogenated (trans-free) margarines.  Vegetable oils, including soybean, sesame, sunflower, olive, peanut, safflower, corn, canola, and cottonseed. Salad dressings or mayonnaise made with a vegetable oil.  Beverages  Water (mineral or sparkling). Coffee and tea. Diet carbonated beverages.  Sweets and desserts  Sherbet, gelatin, and fruit ice. Small amounts of dark chocolate.  Limit all sweets and desserts.  Seasonings and condiments  All seasonings and condiments.  The items listed above may not be a complete list of foods and beverages you can eat. Contact a dietitian for more options.  What foods are not recommended?  Fruits  Canned fruit in heavy syrup. Fruit in cream or butter sauce. Fried fruit. Limit coconut.  Vegetables  Vegetables cooked in cheese, cream, or butter sauce. Fried vegetables.  Grains  Breads made with saturated or trans fats, oils, or whole milk. Croissants. Sweet rolls. Donuts. High-fat crackers, such as cheese crackers.  Meats and other proteins  Fatty meats, such as hot dogs, ribs, sausage, gutierrez, rib-eye roast or steak. High-fat deli meats, such as salami and bologna. Caviar. Domestic duck and goose. Organ meats, such as liver.  Dairy  Cream, sour cream, cream cheese, and creamed cottage cheese. Whole milk cheeses. Whole or 2% milk (liquid, evaporated, or condensed). Whole buttermilk. Cream sauce or high-fat cheese sauce. Whole-milk yogurt.  Fats and oils  Meat fat, or shortening. Cocoa butter, hydrogenated oils, palm oil, coconut oil, palm kernel oil. Solid fats and shortenings, including gutierrez fat, salt pork, lard, and butter. Nondairy cream substitutes. Salad dressings with cheese or sour cream.  Beverages  Regular sodas and any drinks with added sugar.  Sweets and desserts  Frosting. Pudding. Cookies. Cakes. Pies. Milk chocolate or white chocolate. Buttered syrups. Full-fat ice cream or ice cream drinks.  The items listed above may not be a complete list of foods and beverages to avoid. Contact a dietitian for more  information.  Summary  · Heart-healthy meal planning includes limiting unhealthy fats, increasing healthy fats, and making other diet and lifestyle changes.  · Lose weight if you are overweight. Losing just 5-10% of your body weight can help your overall health and prevent diseases such as diabetes and heart disease.  · Focus on eating a balance of foods, including fruits and vegetables, low-fat or nonfat dairy, lean protein, nuts and legumes, whole grains, and heart-healthy oils and fats.  This information is not intended to replace advice given to you by your health care provider. Make sure you discuss any questions you have with your health care provider.  Document Released: 09/26/2009 Document Revised: 01/25/2019 Document Reviewed: 01/25/2019  [employer] Patient Education © 2020 [employer]    Preventing Type 2 Diabetes Mellitus  Type 2 diabetes (type 2 diabetes mellitus) is a long-term (chronic) disease that affects blood sugar (glucose) levels. Normally, a hormone called insulin allows glucose to enter cells in the body. The cells use glucose for energy. In type 2 diabetes, one or both of these problems may be present:  · The body does not make enough insulin.  · The body does not respond properly to insulin that it makes (insulin resistance).  Insulin resistance or lack of insulin causes excess glucose to build up in the blood instead of going into cells. As a result, high blood glucose (hyperglycemia) develops, which can cause many complications. Being overweight or obese and having an inactive (sedentary) lifestyle can increase your risk for diabetes. Type 2 diabetes can be delayed or prevented by making certain nutrition and lifestyle changes.  What nutrition changes can be made?    · Eat healthy meals and snacks regularly. Keep a healthy snack with you for when you get hungry between meals, such as fruit or a handful of nuts.  · Eat lean meats and proteins that are low in saturated fats, such as chicken,  fish, egg whites, and beans. Avoid processed meats.  · Eat plenty of fruits and vegetables and plenty of grains that have not been processed (whole grains). It is recommended that you eat:  ? 1½?2 cups of fruit every day.  ? 2½?3 cups of vegetables every day.  ? 6?8 oz of whole grains every day, such as oats, whole wheat, bulgur, brown rice, quinoa, and millet.  · Eat low-fat dairy products, such as milk, yogurt, and cheese.  · Eat foods that contain healthy fats, such as nuts, avocado, olive oil, and canola oil.  · Drink water throughout the day. Avoid drinks that contain added sugar, such as soda or sweet tea.  · Follow instructions from your health care provider about specific eating or drinking restrictions.  · Control how much food you eat at a time (portion size).  ? Check food labels to find out the serving sizes of foods.  ? Use a kitchen scale to weigh amounts of foods.  · Saute or steam food instead of frying it. Cook with water or broth instead of oils or butter.  · Limit your intake of:  ? Salt (sodium). Have no more than 1 tsp (2,400 mg) of sodium a day. If you have heart disease or high blood pressure, have less than ½?¾ tsp (1,500 mg) of sodium a day.  ? Saturated fat. This is fat that is solid at room temperature, such as butter or fat on meat.  What lifestyle changes can be made?  Activity    · Do moderate-intensity physical activity for at least 30 minutes on at least 5 days of the week, or as much as told by your health care provider.  · Ask your health care provider what activities are safe for you. A mix of physical activities may be best, such as walking, swimming, cycling, and strength training.  · Try to add physical activity into your day. For example:  ? Park in spots that are farther away than usual, so that you walk more. For example, park in a far corner of the parking lot when you go to the office or the grocery store.  ? Take a walk during your lunch break.  ? Use stairs instead of  elevators or escalators.  Weight Loss  · Lose weight as directed. Your health care provider can determine how much weight loss is best for you and can help you lose weight safely.  · If you are overweight or obese, you may be instructed to lose at least 5?7 % of your body weight.  Alcohol and Tobacco    · Limit alcohol intake to no more than 1 drink a day for nonpregnant women and 2 drinks a day for men. One drink equals 12 oz of beer, 5 oz of wine, or 1½ oz of hard liquor.  · Do not use any tobacco products, such as cigarettes, chewing tobacco, and e-cigarettes. If you need help quitting, ask your health care provider.  Work With Your Health Care Provider  · Have your blood glucose tested regularly, as told by your health care provider.  · Discuss your risk factors and how you can reduce your risk for diabetes.  · Get screening tests as told by your health care provider. You may have screening tests regularly, especially if you have certain risk factors for type 2 diabetes.  · Make an appointment with a diet and nutrition specialist (registered dietitian). A registered dietitian can help you make a healthy eating plan and can help you understand portion sizes and food labels.  Why are these changes important?  · It is possible to prevent or delay type 2 diabetes and related health problems by making lifestyle and nutrition changes.  · It can be difficult to recognize signs of type 2 diabetes. The best way to avoid possible damage to your body is to take actions to prevent the disease before you develop symptoms.  What can happen if changes are not made?  · Your blood glucose levels may keep increasing. Having high blood glucose for a long time is dangerous. Too much glucose in your blood can damage your blood vessels, heart, kidneys, nerves, and eyes.  · You may develop prediabetes or type 2 diabetes. Type 2 diabetes can lead to many chronic health problems and complications, such as:  ? Heart  disease.  ? Stroke.  ? Blindness.  ? Kidney disease.  ? Depression.  ? Poor circulation in the feet and legs, which could lead to surgical removal (amputation) in severe cases.  Where to find support  · Ask your health care provider to recommend a registered dietitian, diabetes educator, or weight loss program.  · Look for local or online weight loss groups.  · Join a gym, fitness club, or outdoor activity group, such as a walking club.  Where to find more information  To learn more about diabetes and diabetes prevention, visit:  · [employer] Association (ADA): www.diabetes.org  · National Wayne of Diabetes and Digestive and Kidney Diseases: www.niddk.nih.gov/health-information/diabetes  To learn more about healthy eating, visit:  · The [place]. Department of Agriculture ([employer]), Choose My Plate: www.MobiClub.gov/food-groups  · Office of Disease Prevention and Health Promotion (ODPHP), Dietary Guidelines: www.health.gov/dietaryguidelines  Summary  · You can reduce your risk for type 2 diabetes by increasing your physical activity, eating healthy foods, and losing weight as directed.  · Talk with your health care provider about your risk for type 2 diabetes. Ask about any blood tests or screening tests that you need to have.  This information is not intended to replace advice given to you by your health care provider. Make sure you discuss any questions you have with your health care provider.  Document Released: 04/10/2017 Document Revised: 04/10/2020 Document Reviewed: 02/07/2017  Elsevier Patient Education © 2020 Elsevier Inc.    Preventive Care 40-64 Years Old, Female  Preventive care refers to visits with your health care provider and lifestyle choices that can promote health and wellness. This includes:  · A yearly physical exam. This may also be called an annual well check.  · Regular dental visits and eye exams.  · Immunizations.  · Screening for certain conditions.  · Healthy lifestyle choices,  such as eating a healthy diet, getting regular exercise, not using drugs or products that contain nicotine and tobacco, and limiting alcohol use.  What can I expect for my preventive care visit?  Physical exam  Your health care provider will check your:  · Height and weight. This may be used to calculate body mass index (BMI), which tells if you are at a healthy weight.  · Heart rate and blood pressure.  · Skin for abnormal spots.  Counseling  Your health care provider may ask you questions about your:  · Alcohol, tobacco, and drug use.  · Emotional well-being.  · Home and relationship well-being.  · Sexual activity.  · Eating habits.  · Work and work environment.  · Method of birth control.  · Menstrual cycle.  · Pregnancy history.  What immunizations do I need?    Influenza (flu) vaccine  · This is recommended every year.  Tetanus, diphtheria, and pertussis (Tdap) vaccine  · You may need a Td booster every 10 years.  Varicella (chickenpox) vaccine  · You may need this if you have not been vaccinated.  Zoster (shingles) vaccine  · You may need this after age 60.  Measles, mumps, and rubella (MMR) vaccine  · You may need at least one dose of MMR if you were born in 1957 or later. You may also need a second dose.  Pneumococcal conjugate (PCV13) vaccine  · You may need this if you have certain conditions and were not previously vaccinated.  Pneumococcal polysaccharide (PPSV23) vaccine  · You may need one or two doses if you smoke cigarettes or if you have certain conditions.  Meningococcal conjugate (MenACWY) vaccine  · You may need this if you have certain conditions.  Hepatitis A vaccine  · You may need this if you have certain conditions or if you travel or work in places where you may be exposed to hepatitis A.  Hepatitis B vaccine  · You may need this if you have certain conditions or if you travel or work in places where you may be exposed to hepatitis B.  Haemophilus influenzae type b (Hib) vaccine  · You may  need this if you have certain conditions.  Human papillomavirus (HPV) vaccine  · If recommended by your health care provider, you may need three doses over 6 months.  You may receive vaccines as individual doses or as more than one vaccine together in one shot (combination vaccines). Talk with your health care provider about the risks and benefits of combination vaccines.  What tests do I need?  Blood tests  · Lipid and cholesterol levels. These may be checked every 5 years, or more frequently if you are over 50 years old.  · Hepatitis C test.  · Hepatitis B test.  Screening  · Lung cancer screening. You may have this screening every year starting at age 55 if you have a 30-pack-year history of smoking and currently smoke or have quit within the past 15 years.  · Colorectal cancer screening. All adults should have this screening starting at age 50 and continuing until age 75. Your health care provider may recommend screening at age 45 if you are at increased risk. You will have tests every 1-10 years, depending on your results and the type of screening test.  · Diabetes screening. This is done by checking your blood sugar (glucose) after you have not eaten for a while (fasting). You may have this done every 1-3 years.  · Mammogram. This may be done every 1-2 years. Talk with your health care provider about when you should start having regular mammograms. This may depend on whether you have a family history of breast cancer.  · BRCA-related cancer screening. This may be done if you have a family history of breast, ovarian, tubal, or peritoneal cancers.  · Pelvic exam and Pap test. This may be done every 3 years starting at age 21. Starting at age 30, this may be done every 5 years if you have a Pap test in combination with an HPV test.  Other tests  · Sexually transmitted disease (STD) testing.  · Bone density scan. This is done to screen for osteoporosis. You may have this scan if you are at high risk for  osteoporosis.  Follow these instructions at home:  Eating and drinking  · Eat a diet that includes fresh fruits and vegetables, whole grains, lean protein, and low-fat dairy.  · Take vitamin and mineral supplements as recommended by your health care provider.  · Do not drink alcohol if:  ? Your health care provider tells you not to drink.  ? You are pregnant, may be pregnant, or are planning to become pregnant.  · If you drink alcohol:  ? Limit how much you have to 0-1 drink a day.  ? Be aware of how much alcohol is in your drink. In the [place] one drink equals one 12 oz bottle of beer (355 mL), one 5 oz glass of wine (148 mL), or one 1½ oz glass of hard liquor (44 mL).  Lifestyle  · Take daily care of your teeth and gums.  · Stay active. Exercise for at least 30 minutes on 5 or more days each week.  · Do not use any products that contain nicotine or tobacco, such as cigarettes, e-cigarettes, and chewing tobacco. If you need help quitting, ask your health care provider.  · If you are sexually active, practice safe sex. Use a condom or other form of birth control (contraception) in order to prevent pregnancy and STIs (sexually transmitted infections).  · If told by your health care provider, take low-dose aspirin daily starting at age 50.  What's next?  · Visit your health care provider once a year for a well check visit.  · Ask your health care provider how often you should have your eyes and teeth checked.  · Stay up to date on all vaccines.  This information is not intended to replace advice given to you by your health care provider. Make sure you discuss any questions you have with your health care provider.  Document Released: 01/13/2017 Document Revised: 08/29/2019 Document Reviewed: 08/29/2019  [employer] Patient Education © 2020 Elsevier Inc.

## 2020-05-29 ENCOUNTER — RESULTS ENCOUNTER (OUTPATIENT)
Dept: INTERNAL MEDICINE | Facility: CLINIC | Age: 55
End: 2020-05-29

## 2020-05-29 DIAGNOSIS — Z11.59 NEED FOR HEPATITIS C SCREENING TEST: ICD-10-CM

## 2020-05-29 DIAGNOSIS — E78.00 PURE HYPERCHOLESTEROLEMIA: ICD-10-CM

## 2020-05-29 DIAGNOSIS — R73.03 PREDIABETES: ICD-10-CM

## 2020-08-16 DIAGNOSIS — I48.0 PAROXYSMAL ATRIAL FIBRILLATION WITH RVR (HCC): ICD-10-CM

## 2020-08-17 NOTE — TELEPHONE ENCOUNTER
Pt hasn't been seen to f/u with Dr. Garcia for about 2 years, just forwarding this to you since she has seen you all recently last.

## 2020-08-18 DIAGNOSIS — I48.0 PAROXYSMAL ATRIAL FIBRILLATION WITH RVR (HCC): ICD-10-CM

## 2020-08-18 RX ORDER — METOPROLOL SUCCINATE 25 MG/1
25 TABLET, EXTENDED RELEASE ORAL DAILY
Qty: 90 TABLET | Refills: 3 | Status: SHIPPED | OUTPATIENT
Start: 2020-08-18 | End: 2021-06-01 | Stop reason: SDUPTHER

## 2020-08-18 RX ORDER — METOPROLOL SUCCINATE 25 MG/1
25 TABLET, EXTENDED RELEASE ORAL DAILY
Qty: 90 TABLET | Refills: 0 | Status: SHIPPED | OUTPATIENT
Start: 2020-08-18 | End: 2020-09-11 | Stop reason: SDUPTHER

## 2020-09-10 DIAGNOSIS — E78.00 PURE HYPERCHOLESTEROLEMIA: ICD-10-CM

## 2020-09-11 ENCOUNTER — PROCEDURE VISIT (OUTPATIENT)
Dept: INTERNAL MEDICINE | Facility: CLINIC | Age: 55
End: 2020-09-11

## 2020-09-11 VITALS
BODY MASS INDEX: 26.12 KG/M2 | HEART RATE: 76 BPM | SYSTOLIC BLOOD PRESSURE: 128 MMHG | OXYGEN SATURATION: 98 % | WEIGHT: 153 LBS | HEIGHT: 64 IN | DIASTOLIC BLOOD PRESSURE: 84 MMHG | TEMPERATURE: 98.2 F

## 2020-09-11 DIAGNOSIS — B07.8 COMMON WART: Primary | ICD-10-CM

## 2020-09-11 DIAGNOSIS — B07.9 VIRAL WART OF THUMB: ICD-10-CM

## 2020-09-11 PROCEDURE — 17110 DESTRUCTION B9 LES UP TO 14: CPT | Performed by: PHYSICIAN ASSISTANT

## 2020-09-11 PROCEDURE — 99213 OFFICE O/P EST LOW 20 MIN: CPT | Performed by: PHYSICIAN ASSISTANT

## 2020-09-11 RX ORDER — ATORVASTATIN CALCIUM 40 MG/1
40 TABLET, FILM COATED ORAL NIGHTLY
Qty: 90 TABLET | Refills: 0 | Status: SHIPPED | OUTPATIENT
Start: 2020-09-11 | End: 2020-12-09

## 2020-09-11 NOTE — PROGRESS NOTES
Diana Flores is a 54 y.o. female.     Subjective   History of Present Illness   Here today with concern of warts on both thumbs.  She reports that around 10 months ago she developed a small cluster of 3 tiny warts on her left thumb and 1 on her right thumb.  She was initially treated for the warts by Dr. Kuhn in January but then a few weeks later she developed a new wart on the right thumb and the original cluster of 3 warts on left thumb returned along with 4 new warts.  She denies any prior history of troubles with warts.  She has tried treating at home with Compound W which was ineffective.  No other family members have warts.          The following portions of the patient's history were reviewed and updated as appropriate: allergies, current medications, past family history, past medical history, past social history, past surgical history and problem list.    Review of Systems   Constitutional: Negative for activity change, chills, fatigue and fever.   HENT: Negative.    Eyes: Negative.  Negative for visual disturbance.   Respiratory: Negative for cough, chest tightness, shortness of breath and wheezing.    Cardiovascular: Negative for chest pain, palpitations and leg swelling.   Gastrointestinal: Negative for abdominal pain, constipation, diarrhea, nausea and vomiting.   Endocrine: Negative for polydipsia, polyphagia and polyuria.   Genitourinary: Negative.    Musculoskeletal: Negative.    Skin: Positive for skin lesions (warts on thumbs).   Allergic/Immunologic: Negative for immunocompromised state.   Neurological: Negative for dizziness, speech difficulty, weakness, headache and confusion.   Hematological: Negative for adenopathy. Does not bruise/bleed easily.   Psychiatric/Behavioral: Negative for agitation, sleep disturbance and depressed mood. The patient is not nervous/anxious.          Objective    Physical Exam   Constitutional: She is oriented to person, place, and time. She appears  "well-developed. No distress.   HENT:   Head: Normocephalic and atraumatic.   Eyes: Pupils are equal, round, and reactive to light. Conjunctivae are normal.   Neck: Normal range of motion. Neck supple.   Cardiovascular: Normal rate, regular rhythm and normal heart sounds. Exam reveals no gallop and no friction rub.   No murmur heard.  Pulmonary/Chest: Effort normal and breath sounds normal. No respiratory distress. She has no wheezes. She has no rales.   Abdominal: Soft. Bowel sounds are normal. There is no abdominal tenderness.   Musculoskeletal: Normal range of motion. No tenderness or deformity.   Neurological: She is alert and oriented to person, place, and time. She displays normal reflexes. No cranial nerve deficit or sensory deficit. She exhibits normal muscle tone. Coordination normal.   Skin: Skin is warm and dry. Capillary refill takes less than 2 seconds. Lesion ( 9 total common warts with 2 on right thumb and 7 on left thumb. ) noted. No rash noted. She is not diaphoretic.   Psychiatric: Her behavior is normal. Mood, judgment and thought content normal.   Nursing note and vitals reviewed.      Cryotherapy, Skin Lesion  Date/Time: 9/11/2020 1:49 PM  Performed by: Marie Paredes PA  Authorized by: Marie Paredes PA   Consent: Verbal consent obtained. Written consent obtained.  Risks and benefits: risks, benefits and alternatives were discussed  Consent given by: patient  Patient understanding: patient states understanding of the procedure being performed  Patient consent: the patient's understanding of the procedure matches consent given  Procedure consent: procedure consent matches procedure scheduled  Required items: required blood products, implants, devices, and special equipment available  Patient identity confirmed: verbally with patient  Time out: Immediately prior to procedure a \"time out\" was called to verify the correct patient, procedure, equipment, support staff and site/side " "marked as required.  Preparation: Patient was prepped and draped in the usual sterile fashion.  Local anesthesia used: no    Anesthesia:  Local anesthesia used: no    Sedation:  Patient sedated: no    Patient tolerance: Patient tolerated the procedure well with no immediate complications  Comments: 9 common warts in total (2 on right thumb and 7 on left thumb) were first cleaned with alcohol prep pads allowed to dry for 3 to 4 minutes then each was treated with 3 freeze/thaw cycles of cryotherapy.  Patient tolerated procedure very well without any immediate complications. Lesions were then covered with Band-Aids.            /84   Pulse 76   Temp 98.2 °F (36.8 °C)   Ht 162.6 cm (64.02\")   Wt 69.4 kg (153 lb)   SpO2 98%   BMI 26.25 kg/m²     Nursing note and vitals reviewed.          Assessment/Plan   Diana was seen today for wart removal.    Diagnoses and all orders for this visit:    Common wart  -     Cryotherapy, Skin Lesion    Viral wart of thumb  -     Cryotherapy, Skin Lesion    A total of 9 common warts were treated with cryotherapy today.  She was encouraged to begin using Compound W nightly as tolerated beginning in 3 days.    Schedule f/u with dermatology in around 2 weeks for subsequent treatment if necessary.          VEENA Toney  09/11/2020  1:33 PM  "

## 2020-12-09 DIAGNOSIS — E78.00 PURE HYPERCHOLESTEROLEMIA: ICD-10-CM

## 2020-12-09 RX ORDER — ATORVASTATIN CALCIUM 40 MG/1
TABLET, FILM COATED ORAL
Qty: 90 TABLET | Refills: 3 | Status: SHIPPED | OUTPATIENT
Start: 2020-12-09 | End: 2021-02-08 | Stop reason: SDUPTHER

## 2021-01-16 ENCOUNTER — APPOINTMENT (OUTPATIENT)
Dept: GENERAL RADIOLOGY | Facility: HOSPITAL | Age: 56
End: 2021-01-16

## 2021-01-16 ENCOUNTER — HOSPITAL ENCOUNTER (INPATIENT)
Facility: HOSPITAL | Age: 56
LOS: 2 days | Discharge: HOME | End: 2021-01-18
Attending: EMERGENCY MEDICINE | Admitting: INTERNAL MEDICINE

## 2021-01-16 DIAGNOSIS — I48.91 ATRIAL FIBRILLATION WITH RAPID VENTRICULAR RESPONSE (HCC): ICD-10-CM

## 2021-01-16 DIAGNOSIS — I48.0 PAROXYSMAL ATRIAL FIBRILLATION (HCC): Primary | ICD-10-CM

## 2021-01-16 LAB
ALBUMIN SERPL-MCNC: 4.6 G/DL (ref 3.5–5.2)
ALBUMIN/GLOB SERPL: 1.4 G/DL
ALP SERPL-CCNC: 121 U/L (ref 39–117)
ALT SERPL W P-5'-P-CCNC: 18 U/L (ref 1–33)
ANION GAP SERPL CALCULATED.3IONS-SCNC: 13.7 MMOL/L (ref 5–15)
AST SERPL-CCNC: 20 U/L (ref 1–32)
BASOPHILS # BLD AUTO: 0.02 10*3/MM3 (ref 0–0.2)
BASOPHILS NFR BLD AUTO: 0.2 % (ref 0–1.5)
BILIRUB SERPL-MCNC: 0.2 MG/DL (ref 0–1.2)
BUN SERPL-MCNC: 22 MG/DL (ref 6–20)
BUN/CREAT SERPL: 28.6 (ref 7–25)
CALCIUM SPEC-SCNC: 9.9 MG/DL (ref 8.6–10.5)
CHLORIDE SERPL-SCNC: 105 MMOL/L (ref 98–107)
CO2 SERPL-SCNC: 20.3 MMOL/L (ref 22–29)
CREAT SERPL-MCNC: 0.77 MG/DL (ref 0.57–1)
CRP SERPL-MCNC: 0.03 MG/DL (ref 0–0.5)
DEPRECATED RDW RBC AUTO: 39.4 FL (ref 37–54)
EOSINOPHIL # BLD AUTO: 0.18 10*3/MM3 (ref 0–0.4)
EOSINOPHIL NFR BLD AUTO: 1.8 % (ref 0.3–6.2)
ERYTHROCYTE [DISTWIDTH] IN BLOOD BY AUTOMATED COUNT: 12.2 % (ref 12.3–15.4)
GFR SERPL CREATININE-BSD FRML MDRD: 78 ML/MIN/1.73
GLOBULIN UR ELPH-MCNC: 3.2 GM/DL
GLUCOSE SERPL-MCNC: 101 MG/DL (ref 65–99)
HCT VFR BLD AUTO: 43 % (ref 34–46.6)
HGB BLD-MCNC: 14.3 G/DL (ref 12–15.9)
IMM GRANULOCYTES # BLD AUTO: 0.03 10*3/MM3 (ref 0–0.05)
IMM GRANULOCYTES NFR BLD AUTO: 0.3 % (ref 0–0.5)
LYMPHOCYTES # BLD AUTO: 2.67 10*3/MM3 (ref 0.7–3.1)
LYMPHOCYTES NFR BLD AUTO: 26.9 % (ref 19.6–45.3)
MAGNESIUM SERPL-MCNC: 2 MG/DL (ref 1.6–2.6)
MCH RBC QN AUTO: 29.2 PG (ref 26.6–33)
MCHC RBC AUTO-ENTMCNC: 33.3 G/DL (ref 31.5–35.7)
MCV RBC AUTO: 87.8 FL (ref 79–97)
MONOCYTES # BLD AUTO: 0.95 10*3/MM3 (ref 0.1–0.9)
MONOCYTES NFR BLD AUTO: 9.6 % (ref 5–12)
NEUTROPHILS NFR BLD AUTO: 6.06 10*3/MM3 (ref 1.7–7)
NEUTROPHILS NFR BLD AUTO: 61.2 % (ref 42.7–76)
NRBC BLD AUTO-RTO: 0 /100 WBC (ref 0–0.2)
PLATELET # BLD AUTO: 223 10*3/MM3 (ref 140–450)
PMV BLD AUTO: 11.5 FL (ref 6–12)
POTASSIUM SERPL-SCNC: 3.9 MMOL/L (ref 3.5–5.2)
PROCALCITONIN SERPL-MCNC: 0.04 NG/ML (ref 0–0.25)
PROT SERPL-MCNC: 7.8 G/DL (ref 6–8.5)
RBC # BLD AUTO: 4.9 10*6/MM3 (ref 3.77–5.28)
SODIUM SERPL-SCNC: 139 MMOL/L (ref 136–145)
TROPONIN T SERPL-MCNC: <0.01 NG/ML (ref 0–0.03)
TROPONIN T SERPL-MCNC: <0.01 NG/ML (ref 0–0.03)
TSH SERPL DL<=0.05 MIU/L-ACNC: 1.38 UIU/ML (ref 0.27–4.2)
WBC # BLD AUTO: 9.91 10*3/MM3 (ref 3.4–10.8)

## 2021-01-16 PROCEDURE — 93005 ELECTROCARDIOGRAM TRACING: CPT | Performed by: EMERGENCY MEDICINE

## 2021-01-16 PROCEDURE — 84484 ASSAY OF TROPONIN QUANT: CPT | Performed by: PHYSICIAN ASSISTANT

## 2021-01-16 PROCEDURE — 84145 PROCALCITONIN (PCT): CPT | Performed by: EMERGENCY MEDICINE

## 2021-01-16 PROCEDURE — 86140 C-REACTIVE PROTEIN: CPT | Performed by: EMERGENCY MEDICINE

## 2021-01-16 PROCEDURE — 99223 1ST HOSP IP/OBS HIGH 75: CPT | Performed by: EMERGENCY MEDICINE

## 2021-01-16 PROCEDURE — 99284 EMERGENCY DEPT VISIT MOD MDM: CPT

## 2021-01-16 PROCEDURE — 84484 ASSAY OF TROPONIN QUANT: CPT | Performed by: EMERGENCY MEDICINE

## 2021-01-16 PROCEDURE — 80053 COMPREHEN METABOLIC PANEL: CPT | Performed by: PHYSICIAN ASSISTANT

## 2021-01-16 PROCEDURE — 25010000002 ENOXAPARIN PER 10 MG: Performed by: EMERGENCY MEDICINE

## 2021-01-16 PROCEDURE — 83735 ASSAY OF MAGNESIUM: CPT | Performed by: EMERGENCY MEDICINE

## 2021-01-16 PROCEDURE — 85025 COMPLETE CBC W/AUTO DIFF WBC: CPT | Performed by: PHYSICIAN ASSISTANT

## 2021-01-16 PROCEDURE — 84443 ASSAY THYROID STIM HORMONE: CPT | Performed by: EMERGENCY MEDICINE

## 2021-01-16 PROCEDURE — 71045 X-RAY EXAM CHEST 1 VIEW: CPT

## 2021-01-16 RX ORDER — ACETAMINOPHEN 325 MG/1
650 TABLET ORAL EVERY 4 HOURS PRN
Status: DISCONTINUED | OUTPATIENT
Start: 2021-01-16 | End: 2021-01-18 | Stop reason: HOSPADM

## 2021-01-16 RX ORDER — METOPROLOL TARTRATE 5 MG/5ML
5 INJECTION INTRAVENOUS ONCE
Status: COMPLETED | OUTPATIENT
Start: 2021-01-16 | End: 2021-01-16

## 2021-01-16 RX ORDER — SODIUM CHLORIDE 0.9 % (FLUSH) 0.9 %
10 SYRINGE (ML) INJECTION AS NEEDED
Status: DISCONTINUED | OUTPATIENT
Start: 2021-01-16 | End: 2021-01-18 | Stop reason: HOSPADM

## 2021-01-16 RX ORDER — ATORVASTATIN CALCIUM 40 MG/1
40 TABLET, FILM COATED ORAL NIGHTLY
Status: DISCONTINUED | OUTPATIENT
Start: 2021-01-16 | End: 2021-01-18 | Stop reason: HOSPADM

## 2021-01-16 RX ORDER — ONDANSETRON 2 MG/ML
4 INJECTION INTRAMUSCULAR; INTRAVENOUS EVERY 6 HOURS PRN
Status: DISCONTINUED | OUTPATIENT
Start: 2021-01-16 | End: 2021-01-18 | Stop reason: HOSPADM

## 2021-01-16 RX ORDER — ASPIRIN 81 MG/1
81 TABLET ORAL DAILY
Status: DISCONTINUED | OUTPATIENT
Start: 2021-01-17 | End: 2021-01-18 | Stop reason: HOSPADM

## 2021-01-16 RX ORDER — ACETAMINOPHEN 650 MG/1
650 SUPPOSITORY RECTAL EVERY 4 HOURS PRN
Status: DISCONTINUED | OUTPATIENT
Start: 2021-01-16 | End: 2021-01-18 | Stop reason: HOSPADM

## 2021-01-16 RX ORDER — ACETAMINOPHEN 160 MG/5ML
650 SOLUTION ORAL EVERY 4 HOURS PRN
Status: DISCONTINUED | OUTPATIENT
Start: 2021-01-16 | End: 2021-01-18 | Stop reason: HOSPADM

## 2021-01-16 RX ORDER — SODIUM CHLORIDE 0.9 % (FLUSH) 0.9 %
10 SYRINGE (ML) INJECTION EVERY 12 HOURS SCHEDULED
Status: DISCONTINUED | OUTPATIENT
Start: 2021-01-16 | End: 2021-01-18 | Stop reason: HOSPADM

## 2021-01-16 RX ADMIN — ATORVASTATIN CALCIUM 40 MG: 40 TABLET, FILM COATED ORAL at 20:44

## 2021-01-16 RX ADMIN — SODIUM CHLORIDE 1000 ML: 9 INJECTION, SOLUTION INTRAVENOUS at 19:13

## 2021-01-16 RX ADMIN — METOPROLOL TARTRATE 5 MG: 1 INJECTION, SOLUTION INTRAVENOUS at 17:26

## 2021-01-16 RX ADMIN — ENOXAPARIN SODIUM 40 MG: 40 INJECTION SUBCUTANEOUS at 20:44

## 2021-01-16 RX ADMIN — DILTIAZEM HYDROCHLORIDE 5 MG/HR: 100 INJECTION, POWDER, LYOPHILIZED, FOR SOLUTION INTRAVENOUS at 18:05

## 2021-01-16 RX ADMIN — SODIUM CHLORIDE 250 ML: 9 INJECTION, SOLUTION INTRAVENOUS at 22:50

## 2021-01-16 RX ADMIN — METOPROLOL TARTRATE 25 MG: 25 TABLET, FILM COATED ORAL at 20:44

## 2021-01-16 RX ADMIN — METOPROLOL TARTRATE 5 MG: 1 INJECTION, SOLUTION INTRAVENOUS at 17:39

## 2021-01-16 NOTE — ED NOTES
Patient's blood pressure 79/46.  Harley Rodriguez PA-c notified and new orders received to decrease diltiazem rate to 5mg/hr and to continue to monitor blood pressure.      Kait Rudolph, RN  01/16/21 5851

## 2021-01-16 NOTE — ED PROVIDER NOTES
Subjective   55-year-old female presents with a rapid heart rate, she dates she was cleaning her home, and the heart rate started about an hour prior to arrival. She does take a beta-blocker and aspirin, she has a history of A. fib. She has had this in the past and she can usually take a deep breath and make the heart rate slowed down however they did not work this time. She states she feels a little bit short of breath but otherwise no other symptoms.      History provided by:  Patient   used: No        Review of Systems   Respiratory: Positive for shortness of breath.    Cardiovascular: Positive for palpitations.        Rapid heart rate   All other systems reviewed and are negative.      Past Medical History:   Diagnosis Date   • Arthritis    • Diverticulosis 2017   • Hyperlipidemia    • Influenza A 2019   • Intermittent palpitations    • Lower back pain    • Lumbar spondylosis    • MVP (mitral valve prolapse)    • Osteopenia    • Paroxysmal atrial fibrillation with RVR (CMS/HCC) 07/10/2019   • Prediabetes    • Sinus bradycardia    • Snores    • Wears glasses        No Known Allergies    Past Surgical History:   Procedure Laterality Date   •  SECTION     • COLONOSCOPY N/A 2017    Procedure: COLONOSCOPY;  Surgeon: Michele Marion MD;  Location: [facility] ENDOSCOPY;  Service:    • KNEE ARTHROSCOPY Left 2017    Procedure: KNEE ARTHROSCOPY LEFT  WITH PARTIAL MEDIAL and  LATERAL  MENISCECTOMY;  Surgeon: Dusty Dangelo MD;  Location: [facility] OR;  Service:    • KNEE ARTHROSCOPY Right 3/4/2020    Procedure: KNEE ARTHROSCOPY RIGHT WITH PARTIAL MEDIAL MENISCECTOMY;  Surgeon: Dusty Dangelo MD;  Location: [facility] OR;  Service: Orthopedics;  Laterality: Right;   • SUBTOTAL HYSTERECTOMY  2003   • TRIGGER FINGER RELEASE Right     3RD FINGER       Family History   Problem Relation Age of Onset   • Pancreatic cancer Mother    • Colon cancer Neg Hx    • Liver cancer Neg Hx    • Liver  disease Neg Hx    • Stomach cancer Neg Hx    • Esophageal cancer Neg Hx        Social History     Socioeconomic History   • Marital status:      Spouse name: Not on file   • Number of children: Not on file   • Years of education: Not on file   • Highest education level: Not on file   Tobacco Use   • Smoking status: Never Smoker   • Smokeless tobacco: Never Used   Substance and Sexual Activity   • Alcohol use: Yes     Comment: SOCIAL   • Drug use: No   • Sexual activity: Defer           Objective   Physical Exam  Vitals signs and nursing note reviewed.   Constitutional:       Appearance: She is well-developed.   HENT:      Head: Normocephalic.   Neck:      Musculoskeletal: Normal range of motion and neck supple.   Cardiovascular:      Rate and Rhythm: Normal rate and regular rhythm.   Pulmonary:      Effort: Pulmonary effort is normal.      Breath sounds: Normal breath sounds.   Abdominal:      Palpations: Abdomen is soft.   Musculoskeletal: Normal range of motion.   Skin:     General: Skin is warm and dry.   Neurological:      Mental Status: She is alert and oriented to person, place, and time.      Deep Tendon Reflexes: Reflexes are normal and symmetric.         Procedures           ED Course  ED Course as of Jan 17 0703   Sat Jan 16, 2021   1723 Discussed with Dr. Crouch, if patient hemodynamically stable he recommended getting beta-blockers or Cardizem    [CS]   1901 Discussed with Dr. Craven  he accepted the patient for admission    [CS]      ED Course User Index  [CS] Harley Rodriguez Jr., QUIANA                                           [facility]  Number of Diagnoses or Management Options  Paroxysmal atrial fibrillation (CMS/HCC): new and requires workup  Diagnosis management comments: Patient required multiple doses of rate control medications and ended up on cardizem infusion.     30 minutes of critical care provided. This time excludes other billable procedures. Time does include preparation of documents,  medical consultations, review of old records, and direct bedside care. Patient was at high risk for life-threatening deterioration due to atrial fibrillation with rapid ventricular rate requiring multiple IV medications.          Amount and/or Complexity of Data Reviewed  Discuss the patient with other providers: yes  Independent visualization of images, tracings, or specimens: yes    Risk of Complications, Morbidity, and/or Mortality  Presenting problems: high  Diagnostic procedures: high  Management options: high    Critical Care  Total time providing critical care: 30-74 minutes      Final diagnoses:   Paroxysmal atrial fibrillation (CMS/[facility])            Harley Rodriguez Jr., PA-C  01/16/21 1908       Carson Lomax MD  01/17/21 07

## 2021-01-17 ENCOUNTER — APPOINTMENT (OUTPATIENT)
Dept: CARDIOLOGY | Facility: HOSPITAL | Age: 56
End: 2021-01-17

## 2021-01-17 LAB
QT INTERVAL: 332 MS
QTC INTERVAL: 443 MS

## 2021-01-17 PROCEDURE — 25010000002 ENOXAPARIN PER 10 MG: Performed by: EMERGENCY MEDICINE

## 2021-01-17 PROCEDURE — 25010000002 AMIODARONE IN DEXTROSE 5% 360-4.14 MG/200ML-% SOLUTION: Performed by: INTERNAL MEDICINE

## 2021-01-17 PROCEDURE — 25010000002 AMIODARONE IN DEXTROSE 5% 150-4.21 MG/100ML-% SOLUTION: Performed by: INTERNAL MEDICINE

## 2021-01-17 PROCEDURE — 93005 ELECTROCARDIOGRAM TRACING: CPT | Performed by: INTERNAL MEDICINE

## 2021-01-17 PROCEDURE — 99254 IP/OBS CNSLTJ NEW/EST MOD 60: CPT | Performed by: INTERNAL MEDICINE

## 2021-01-17 PROCEDURE — 99233 SBSQ HOSP IP/OBS HIGH 50: CPT | Performed by: EMERGENCY MEDICINE

## 2021-01-17 RX ORDER — METOPROLOL TARTRATE 50 MG/1
50 TABLET, FILM COATED ORAL EVERY 12 HOURS SCHEDULED
Status: DISCONTINUED | OUTPATIENT
Start: 2021-01-17 | End: 2021-01-18

## 2021-01-17 RX ADMIN — ASPIRIN 81 MG: 81 TABLET, COATED ORAL at 08:39

## 2021-01-17 RX ADMIN — AMIODARONE HYDROCHLORIDE 1 MG/MIN: 1.8 INJECTION, SOLUTION INTRAVENOUS at 11:34

## 2021-01-17 RX ADMIN — METOPROLOL TARTRATE 50 MG: 25 TABLET, FILM COATED ORAL at 08:38

## 2021-01-17 RX ADMIN — SODIUM CHLORIDE, PRESERVATIVE FREE 10 ML: 5 INJECTION INTRAVENOUS at 08:40

## 2021-01-17 RX ADMIN — SODIUM CHLORIDE, PRESERVATIVE FREE 10 ML: 5 INJECTION INTRAVENOUS at 20:23

## 2021-01-17 RX ADMIN — ENOXAPARIN SODIUM 40 MG: 40 INJECTION SUBCUTANEOUS at 20:22

## 2021-01-17 RX ADMIN — AMIODARONE HYDROCHLORIDE 0.5 MG/MIN: 1.8 INJECTION, SOLUTION INTRAVENOUS at 17:22

## 2021-01-17 RX ADMIN — AMIODARONE HYDROCHLORIDE 150 MG: 1.5 INJECTION, SOLUTION INTRAVENOUS at 10:52

## 2021-01-17 RX ADMIN — METOPROLOL TARTRATE 50 MG: 25 TABLET, FILM COATED ORAL at 20:22

## 2021-01-17 RX ADMIN — ATORVASTATIN CALCIUM 40 MG: 40 TABLET, FILM COATED ORAL at 20:22

## 2021-01-17 NOTE — PLAN OF CARE
Goal Outcome Evaluation:  Plan of Care Reviewed With: patient  Progress: no change  Outcome Summary: BP hypotensive HR tachy-cardizem stopped for hypotension; continue to monitor BP HR

## 2021-01-17 NOTE — PROGRESS NOTES
[facility]    Name:  Diana Flores   Age:  55 y.o.  Sex:  female  :  1965  MRN:  [id number]   Visit Number:  [id number]  Admission Date:  2021  Date Of Service:  21  Primary Care Physician:  Deon Garcia MD     LOS: 1 day :  Patient Care Team:  Deon Garcia MD as PCP - General:    Chief Complaint:  Chest pressure, palpitations    Subjective / Interval History: She denies further CP, dyspnea, or palpitations. She was given IVF overnight at multiple occasions due to her hypotension. Her diltiazem was held. He denies any symptoms of dizziness or lightheadedness.  She is tolerating her meals well.     Review of Systems: A full 10 point review of systems was performed and all pertinent positives and negatives are noted in the HPI.    Vital Signs:  Temp:  [97.2 °F (36.2 °C)-98.9 °F (37.2 °C)] 98.9 °F (37.2 °C)  Heart Rate:  [] 118  Resp:  [16-18] 18  BP: ()/(40-97) 75/62    Intake and output:  I/O last 3 completed shifts:  In: -   Out: 1150 [Urine:1150]  No intake/output data recorded.    Physical Exam:  General: NAD, resting in bed  HEENT: EOMI, NC/AT  Heart: Irregularly irregular, tachycardic  Lungs: nonlabored  Abdomen: Soft, nondistended, nontender  Extremities: No edema  Neurological: A&O x3, moves all extremities  Psychological: Mood and affect appropriate, speech is coherent  Skin: warm, dry    Laboratory results:  Results from last 7 days   Lab Units 21  1805   SODIUM mmol/L 139   POTASSIUM mmol/L 3.9   CHLORIDE mmol/L 105   CO2 mmol/L 20.3*   BUN mg/dL 22*   CREATININE mg/dL 0.77   CALCIUM mg/dL 9.9   BILIRUBIN mg/dL 0.2   ALK PHOS U/L 121*   ALT (SGPT) U/L 18   AST (SGOT) U/L 20   GLUCOSE mg/dL 101*     Results from last 7 days   Lab Units 21  1805   WBC 10*3/mm3 9.91   HEMOGLOBIN g/dL 14.3   HEMATOCRIT % 43.0   PLATELETS 10*3/mm3 223         Results from last 7 days   Lab Units 21  2204 21  2776    TROPONIN T ng/mL <0.010 <0.010     I have reviewed the patient's laboratory results.    Radiology results:  Imaging Results (Last 24 Hours)     Procedure Component Value Units Date/Time    XR Chest 1 View [[id number]] Collected: 01/17/21 0519     Updated: 01/17/21 0521    Narrative:      PROCEDURE: XR CHEST 1 VW-     HISTORY: rapid hr        COMPARISON: 07/10/2019.     FINDINGS:  The heart size is normal. The mediastinum is normal. No acute  pulmonary abnormality is identified. There is no pneumothorax. The bony  thorax in intact.       Impression:      No acute cardiopulmonary process.           This report was finalized on 1/17/2021 5:19 AM by Harley York MD.      I have reviewed the patient's radiology reports.    Medication Review: I have reviewed the patient's active and prn medications.     Assessment:    Atrial fibrillation with rapid ventricular response (CMS/HCC)    Plan:  -She remains in RVR this AM, and asymptomatic with her hypotension. Increase Lopressor, hold dilt  -await cardiology consultation   -cardioversion as indicated   -echo pending  -[id number] score is 1, defer RAMONA Craven DO  01/17/21  07:59 EST    Dictated utilizing Dragon dictation.

## 2021-01-17 NOTE — H&P
[facility]      Name:  Diana Flores   Age:  55 y.o.  Sex:  female  :  1965  MRN:  [id number]   Visit Number:  [id number]  Admission Date:  2021  Date Of Service:  21  Primary Care Physician:  Deon Garcia MD    Chief Complaint: Chest pressure and palpitations    History Of Presenting Illness:  55 F history of atrial fibrillation on aspirin and metoprolol presented to the ED complaining of chest pressure and palpitations onset this afternoon around 4 PM.  Patient was cleaning at her usual pace with no additional or extra exertion per the norm when she felt chest pressure midsternal, and palpitations come on.  It was nonradiating, did not have any associated dyspnea, nausea, diaphoresis, or any other acute symptoms.  It has improved by arrival to the ER after being started on diltiazem drip.  She did miss her metoprolol this morning.  She denies any previous episode of experiencing chest pressure but has felt palpitations in the past.  In the ED, after initiation of the diltiazem drip she became hypotensive and was started on a fluid bolus.  The diltiazem rate has been slowed.    Review Of Systems: A full 10 point review of systems was performed and all pertinent positives and negatives are noted in the HPI.     Past Medical History:  Past Medical History:   Diagnosis Date   • Arthritis    • Diverticulosis 2017   • Hyperlipidemia    • Influenza A 2019   • Intermittent palpitations    • Lower back pain    • Lumbar spondylosis    • MVP (mitral valve prolapse)    • Osteopenia    • Paroxysmal atrial fibrillation with RVR (CMS/HCC) 07/10/2019   • Prediabetes    • Sinus bradycardia    • Snores    • Wears glasses      Past Surgical history:  Past Surgical History:   Procedure Laterality Date   •  SECTION     • COLONOSCOPY N/A 2017    Procedure: COLONOSCOPY;  Surgeon: Michele Marion MD;  Location: [facility] ENDOSCOPY;   Service:    • KNEE ARTHROSCOPY Left 7/21/2017    Procedure: KNEE ARTHROSCOPY LEFT  WITH PARTIAL MEDIAL and  LATERAL  MENISCECTOMY;  Surgeon: Dusty Dangelo MD;  Location: [facility];  Service:    • KNEE ARTHROSCOPY Right 3/4/2020    Procedure: KNEE ARTHROSCOPY RIGHT WITH PARTIAL MEDIAL MENISCECTOMY;  Surgeon: Dusty Dangelo MD;  Location: [facility];  Service: Orthopedics;  Laterality: Right;   • SUBTOTAL HYSTERECTOMY  11/2003   • TRIGGER FINGER RELEASE Right     3RD FINGER     Social History:  Social History     Socioeconomic History   • Marital status:      Spouse name: Not on file   • Number of children: Not on file   • Years of education: Not on file   • Highest education level: Not on file   Tobacco Use   • Smoking status: Never Smoker   • Smokeless tobacco: Never Used   Substance and Sexual Activity   • Alcohol use: Yes     Comment: SOCIAL   • Drug use: No   • Sexual activity: Defer     Family History:  Family History   Problem Relation Age of Onset   • Pancreatic cancer Mother    • Colon cancer Neg Hx    • Liver cancer Neg Hx    • Liver disease Neg Hx    • Stomach cancer Neg Hx    • Esophageal cancer Neg Hx        Allergies:  Patient has no known allergies.    Home Medications:  Prior to Admission Medications     Prescriptions Last Dose Informant Patient Reported? Taking?    aspirin 81 MG EC tablet   No No    Take 1 tablet by mouth Daily.    atorvastatin (LIPITOR) 40 MG tablet   No No    TAKE 1 TABLET BY MOUTH EVERY DAY AT NIGHT    Calcium Carbonate-Vit D-Min (CALCIUM 1200 PO)  Self Yes No    Take 1,200 mg by mouth.    metoprolol succinate XL (TOPROL-XL) 25 MG 24 hr tablet   No No    Take 1 tablet by mouth Daily. 200001    Vitamin D, Cholecalciferol, (CHOLECALCIFEROL) 400 units tablet  Spouse/Significant Other Yes No    Take 400 Units by mouth Daily.             ED Medications:  Medications   dilTIAZem (CARDIZEM) 100 mg in 100 mL NS infusion (ADV) (5 mg/hr Intravenous Rate/Dose Change 1/16/21 2356)    metoprolol tartrate (LOPRESSOR) tablet 25 mg (has no administration in time range)   sodium chloride 0.9 % bolus 1,000 mL (1,000 mL Intravenous New Bag 1/16/21 1913)   metoprolol tartrate (LOPRESSOR) injection 5 mg (5 mg Intravenous Given 1/16/21 1726)   metoprolol tartrate (LOPRESSOR) injection 5 mg (5 mg Intravenous Given 1/16/21 1739)     Vital Signs:  Temp:  [97.2 °F (36.2 °C)] 97.2 °F (36.2 °C)  Heart Rate:  [109-170] 121  Resp:  [16] 16  BP: ()/(52-96) 90/78        01/16/21  1707   Weight: 70.3 kg (155 lb)     Body mass index is 26.61 kg/m².    Physical Exam:  General: NAD, resting in bed  HEENT: EOMI, NC/AT  Heart: Irregularly irregular, tachycardic  Lungs: nonlabored  Abdomen: Soft, nondistended, nontender  Extremities: No edema  Neurological: A&O x3, moves all extremities  Psychological: Mood and affect appropriate, speech is coherent  Skin: warm, dry    Laboratory data: I have reviewed the labs done in the emergency room.    Results from last 7 days   Lab Units 01/16/21  1805   SODIUM mmol/L 139   POTASSIUM mmol/L 3.9   CHLORIDE mmol/L 105   CO2 mmol/L 20.3*   BUN mg/dL 22*   CREATININE mg/dL 0.77   CALCIUM mg/dL 9.9   BILIRUBIN mg/dL 0.2   ALK PHOS U/L 121*   ALT (SGPT) U/L 18   AST (SGOT) U/L 20   GLUCOSE mg/dL 101*     Results from last 7 days   Lab Units 01/16/21  1805   WBC 10*3/mm3 9.91   HEMOGLOBIN g/dL 14.3   HEMATOCRIT % 43.0   PLATELETS 10*3/mm3 223         Results from last 7 days   Lab Units 01/16/21  1805   TROPONIN T ng/mL <0.010     Pain Management Panel     There is no flowsheet data to display.        EKG:  Reviewed by myself reveals atrial fibrillation with RVR, rate 163, QTc 332, inferolateral ST depressions, ST elevation in aVR, abnormal EKG    Radiology:  Imaging Results (Last 72 Hours)     Procedure Component Value Units Date/Time    XR Chest 1 View [[id number]] Resulted: 01/16/21 1844     Updated: 01/16/21 1844      CXR personally reviewed by myself reveals no acute  pathology  Assessment:    Atrial fibrillation with rapid ventricular response (CMS/HCC)    Plan:  -administer Lopressor now, titrate off dilt gtt given the hypotension  -cardioversion as indicated   -r/o ischemia; repeat Tt, obtain electrolytes, echo, cardiology eval in AM  -[id number] score is 1, defer AC  -full code, inpt, high risk    Raquel Craven DO  01/16/21  19:29 EST    Dictated utilizing Dragon dictation.

## 2021-01-17 NOTE — PLAN OF CARE
Goal Outcome Evaluation:  Plan of Care Reviewed With: patient, spouse, son  Progress: no change   BP fluctuates. Dr. Crouch at bedside and medications changed. Amiodarone bailey Ortiz remains in A-Fib att.  NPO at midnight for possible conversion tomorrow.

## 2021-01-17 NOTE — CONSULTS
"     [facility] Cardiology Consult Note    Diana Flores  1965  [id number]  21    Requesting Physician: Raquel Craven Do    Chief Complaint: Palpitations    History of Present Illness:   Mrs. Diana Flores is a 55 y.o. female who is being seen by Cardiology for evaluation of atrial fibrillation with rapid ventricular rates.  The patient has a past medical history significant for prediabetes and hyperlipidemia.  She has a past cardiac history significant for paroxysmal atrial fibrillation, with the initial episode occurring in .  She was scheduled for cardioversion at that time, however converted to sinus rhythm immediately prior to cardioversion.  She subsequently underwent an MPS in , with no evidence of inducible ischemia.  The patient reports she has not had any recurrent episodes of PAF until yesterday evening at approximately 4 PM.  The patient reports she was doing some light cleaning, when she noted acute onset palpitations described as a \"fast and irregular\" heart rate.  This was associated with mild chest pressure.  She denies any significant shortness of breath.  Given persistence of her palpitations and chest discomfort, she presented to the emergency department for evaluation.    Upon initial evaluation the emergency department, the patient was found to be in atrial fibrillation with rapid ventricular rates in the 160s.  A laboratory evaluation including troponin x2 was unremarkable.  She was given beta-blocker and subsequently a diltiazem drip in the emergency department.  The patient did have improvement in her heart rate, however the size of drip was discontinued due to hypotension.  This morning, the patient continues to report palpitations.  On telemetry, she remains in atrial fibrillation with ventricular rates in the 120s.  Cardiology has been consulted for further recommendations.    Prior Cardiac Testin. Last Coronary Angio: None  2. Prior Stress " Testing: MPS 2019   1.  No evidence of inducible ischemia  3. Last Echo: 2019   1.  Normal left ventricular systolic function, LVEF greater than 55%   2.  Trace MR and TR  4. Prior Holter Monitor: None    Review of Systems:   Review of Systems   Constitutional: Negative for activity change, appetite change, chills, diaphoresis, fatigue, fever, unexpected weight gain and unexpected weight loss.   Eyes: Negative for blurred vision and double vision.   Respiratory: Positive for chest tightness. Negative for cough, shortness of breath and wheezing.    Cardiovascular: Positive for palpitations. Negative for chest pain and leg swelling.   Gastrointestinal: Negative for abdominal pain, anal bleeding, blood in stool and GERD.   Endocrine: Negative for cold intolerance and heat intolerance.   Genitourinary: Negative for hematuria.   Neurological: Negative for dizziness, syncope, weakness and light-headedness.   Hematological: Does not bruise/bleed easily.   Psychiatric/Behavioral: Negative for depressed mood and stress. The patient is not nervous/anxious.        Past Medical History:   Past Medical History:   Diagnosis Date   • Arthritis    • Diverticulosis 2017   • Hyperlipidemia    • Influenza A 2019   • Intermittent palpitations    • Lower back pain    • Lumbar spondylosis    • MVP (mitral valve prolapse)    • Osteopenia    • Paroxysmal atrial fibrillation with RVR (CMS/HCC) 07/10/2019   • Prediabetes    • Sinus bradycardia    • Snores    • Wears glasses        Past Surgical History:   Past Surgical History:   Procedure Laterality Date   •  SECTION     • COLONOSCOPY N/A 2017    Procedure: COLONOSCOPY;  Surgeon: Michele Marion MD;  Location: [facility] ENDOSCOPY;  Service:    • KNEE ARTHROSCOPY Left 2017    Procedure: KNEE ARTHROSCOPY LEFT  WITH PARTIAL MEDIAL and  LATERAL  MENISCECTOMY;  Surgeon: Dusty Dangelo MD;  Location: [facility] OR;  Service:    • KNEE ARTHROSCOPY Right 3/4/2020     Procedure: KNEE ARTHROSCOPY RIGHT WITH PARTIAL MEDIAL MENISCECTOMY;  Surgeon: Dusty Dangelo MD;  Location: [facility];  Service: Orthopedics;  Laterality: Right;   • SUBTOTAL HYSTERECTOMY  11/2003   • TRIGGER FINGER RELEASE Right     3RD FINGER       Family History:   Family History   Problem Relation Age of Onset   • Pancreatic cancer Mother    • Colon cancer Neg Hx    • Liver cancer Neg Hx    • Liver disease Neg Hx    • Stomach cancer Neg Hx    • Esophageal cancer Neg Hx        Social History:   Social History     Socioeconomic History   • Marital status:      Spouse name: Not on file   • Number of children: Not on file   • Years of education: Not on file   • Highest education level: Not on file   Tobacco Use   • Smoking status: Never Smoker   • Smokeless tobacco: Never Used   Substance and Sexual Activity   • Alcohol use: Yes     Comment: SOCIAL   • Drug use: No   • Sexual activity: Defer       Medications:     Current Facility-Administered Medications:   •  acetaminophen (TYLENOL) tablet 650 mg, 650 mg, Oral, Q4H PRN **OR** acetaminophen (TYLENOL) 160 MG/5ML solution 650 mg, 650 mg, Oral, Q4H PRN **OR** acetaminophen (TYLENOL) suppository 650 mg, 650 mg, Rectal, Q4H PRN, Raquel Craven,   •  aspirin EC tablet 81 mg, 81 mg, Oral, Daily, Raquel Craven DO, 81 mg at 01/17/21 0839  •  atorvastatin (LIPITOR) tablet 40 mg, 40 mg, Oral, Nightly, Raquel Craven, , 40 mg at 01/16/21 2044  •  dilTIAZem (CARDIZEM) 100 mg in 100 mL NS infusion (ADV), 5-15 mg/hr, Intravenous, Titrated, Raquel Craven DO, Stopped at 01/16/21 2240  •  enoxaparin (LOVENOX) syringe 40 mg, 40 mg, Subcutaneous, Nightly, Raquel Craven, , 40 mg at 01/16/21 2044  •  metoprolol tartrate (LOPRESSOR) tablet 50 mg, 50 mg, Oral, Q12H, Raquel Craven, , 50 mg at 01/17/21 0838  •  ondansetron (ZOFRAN) injection 4 mg, 4 mg, Intravenous, Q6H PRN, Raquel Craven,   •  sodium chloride 0.9 % flush 10 mL, 10 mL, Intravenous, Q12H, Raquel Craven, , 10 mL  at 01/17/21 0840  •  sodium chloride 0.9 % flush 10 mL, 10 mL, Intravenous, PRN, Herber, Umar, DO    Allergies:   No Known Allergies    Physical Exam:  Vital Signs:   Vitals:    01/17/21 0525 01/17/21 0600 01/17/21 0738 01/17/21 0838   BP:  90/55 (!) 75/62 109/67   BP Location:   Right arm    Patient Position:   Sitting    Pulse: 117 117 118 (!) 124   Resp:   18    Temp:   98.9 °F (37.2 °C)    TempSrc:   Oral    SpO2: 97% 96%     Weight: 71.8 kg (158 lb 4.6 oz)      Height:           Physical Exam  Vitals signs and nursing note reviewed.   Constitutional:       General: She is not in acute distress.     Appearance: Normal appearance. She is well-developed. She is not diaphoretic.   HENT:      Head: Normocephalic and atraumatic.   Eyes:      General: No scleral icterus.     Pupils: Pupils are equal, round, and reactive to light.   Neck:      Musculoskeletal: Neck supple. No muscular tenderness.      Trachea: No tracheal deviation.   Cardiovascular:      Rate and Rhythm: Tachycardia present. Rhythm irregular.      Heart sounds: Normal heart sounds. No murmur. No friction rub. No gallop.       Comments: Normal JVD.  Pulmonary:      Effort: Pulmonary effort is normal. No respiratory distress.      Breath sounds: Normal breath sounds. No stridor. No wheezing or rales.   Chest:      Chest wall: No tenderness.   Abdominal:      General: Bowel sounds are normal. There is no distension.      Palpations: Abdomen is soft.      Tenderness: There is no abdominal tenderness. There is no guarding or rebound.   Musculoskeletal: Normal range of motion.         General: No swelling.   Lymphadenopathy:      Cervical: No cervical adenopathy.   Skin:     General: Skin is warm and dry.      Findings: No erythema.   Neurological:      General: No focal deficit present.      Mental Status: She is alert and oriented to person, place, and time.   Psychiatric:         Mood and Affect: Mood normal.         Behavior: Behavior normal.          Results Review:   Results from last 7 days   Lab Units 01/16/21  1805   SODIUM mmol/L 139   POTASSIUM mmol/L 3.9   CHLORIDE mmol/L 105   CO2 mmol/L 20.3*   BUN mg/dL 22*   CREATININE mg/dL 0.77   CALCIUM mg/dL 9.9   BILIRUBIN mg/dL 0.2   ALK PHOS U/L 121*   ALT (SGPT) U/L 18   AST (SGOT) U/L 20   GLUCOSE mg/dL 101*     Results from last 7 days   Lab Units 01/16/21  2204 01/16/21  1805   TROPONIN T ng/mL <0.010 <0.010     Results from last 7 days   Lab Units 01/16/21  1805   WBC 10*3/mm3 9.91   HEMOGLOBIN g/dL 14.3   HEMATOCRIT % 43.0   PLATELETS 10*3/mm3 223         Results from last 7 days   Lab Units 01/16/21  1805   MAGNESIUM mg/dL 2.0           I personally viewed and interpreted the patient's EKG/Telemetry data     Assessment / Plan:     1.  Paroxysmal atrial fibrillation with rapid trickle rates  --Initial episode of PAF with RVR occurred in 7/19, no recurrence until now  --Current episode appears to have onset at approximately 4 PM yesterday  --Did not tolerate diltiazem drip overnight due to hypotension  --Given onset of A. fib with RVR is within 48 hours, will start IV amiodarone to attempt chemical cardioversion  --If sinus rhythm is restored with IV amiodarone, would then discontinue amiodarone and discharge home with follow-up in the cardiology clinic next week to consider initiation of flecainide for rhythm control strategy  --If unable to achieve sinus rhythm with IV amiodarone, will then make n.p.o. after midnight for consideration of GALI with cardioversion  --Continue home metoprolol on discharge  --While [id number] is 1, given potential need for electrocardioversion recommend initiation of Eliquis.  Will reevaluate need for therapeutic anticoagulation upon outpatient follow-up      Thank you for allowing me to participate in the care of your patient. Please do not hesitate to contact me with additional questions or concerns.     CORBY Crouch MD  Interventional Cardiology    01/17/21  10:13 EST

## 2021-01-17 NOTE — PROGRESS NOTES
Discharge Planning Assessment  [facility]     Patient Name: Diana Flores  MRN: [id number]  Today's Date: 1/17/2021    Admit Date: 1/16/2021    Discharge Needs Assessment     Row Name 01/17/21 1426       Living Environment    Lives With  spouse    Name(s) of Who Lives With Patient  Sean Flores spouse    Current Living Arrangements  home/apartment/condo    Duration at Residence  16 years    Primary Care Provided by  self    Provides Primary Care For  no one    Family Caregiver if Needed  spouse    Quality of Family Relationships  supportive Pt has 3 grown sons who are available       Resource/Environmental Concerns    Resource/Environmental Concerns  none       Transition Planning    Patient/Family Anticipates Transition to  home    Patient/Family Anticipated Services at Transition  none    Transportation Anticipated  family or friend will provide       Discharge Needs Assessment    Readmission Within the Last 30 Days  no previous admission in last 30 days    Equipment Currently Used at Home  none    Concerns to be Addressed  no discharge needs identified    Anticipated Changes Related to Illness  none    Equipment Needed After Discharge  none        Discharge Plan     Row Name 01/17/21 1429       Plan    Plan  Spoke with pt about DCP  Confirmed current address and phone contacts  Sean Flores, spouse [phone]   No POA   Pt states she can perform all ADL good   PCP Deon Garcia MD  DME none  Pt states she has never used HH services in the past   Will continue to assess pt for any further needs prior to being discharged        Continued Care and Services - Admitted Since 1/16/2021    Coordination has not been started for this encounter.       Expected Discharge Date and Time     Expected Discharge Date Expected Discharge Time    Jan 19, 2021         Demographic Summary     Row Name 01/17/21 1424       General Information    Admission Type  inpatient    Arrived From  emergency department    Referral  Source  admission list    Reason for Consult  discharge planning    Preferred Language  English       Contact Information    Permission Granted to Share Info With      Contact Information Obtained for          Functional Status     Row Name 01/17/21 2925       Functional Status    Usual Activity Tolerance  good    Current Activity Tolerance  good    Functional Status Comments  Pt states she can perform all ADL good and without problems       Functional Status, IADL    Medications  independent    Meal Preparation  independent    Housekeeping  independent    Laundry  independent    Shopping  independent       Employment/    Employment Status  employed full-time        Psychosocial    No documentation.       Abuse/Neglect    No documentation.       Legal    No documentation.       Substance Abuse    No documentation.       Patient Forms    No documentation.           Tara Craig RN

## 2021-01-18 ENCOUNTER — APPOINTMENT (OUTPATIENT)
Dept: CARDIOLOGY | Facility: HOSPITAL | Age: 56
End: 2021-01-18

## 2021-01-18 ENCOUNTER — READMISSION MANAGEMENT (OUTPATIENT)
Dept: CALL CENTER | Facility: HOSPITAL | Age: 56
End: 2021-01-18

## 2021-01-18 VITALS
RESPIRATION RATE: 18 BRPM | DIASTOLIC BLOOD PRESSURE: 46 MMHG | WEIGHT: 158.29 LBS | OXYGEN SATURATION: 96 % | TEMPERATURE: 98.1 F | BODY MASS INDEX: 27.02 KG/M2 | HEART RATE: 54 BPM | SYSTOLIC BLOOD PRESSURE: 102 MMHG | HEIGHT: 64 IN

## 2021-01-18 PROCEDURE — 99233 SBSQ HOSP IP/OBS HIGH 50: CPT | Performed by: INTERNAL MEDICINE

## 2021-01-18 PROCEDURE — 99239 HOSP IP/OBS DSCHRG MGMT >30: CPT | Performed by: INTERNAL MEDICINE

## 2021-01-18 PROCEDURE — 93005 ELECTROCARDIOGRAM TRACING: CPT | Performed by: INTERNAL MEDICINE

## 2021-01-18 PROCEDURE — 25010000002 AMIODARONE IN DEXTROSE 5% 360-4.14 MG/200ML-% SOLUTION: Performed by: INTERNAL MEDICINE

## 2021-01-18 RX ORDER — FLECAINIDE ACETATE 50 MG/1
50 TABLET ORAL EVERY 12 HOURS
Qty: 60 TABLET | Refills: 0 | Status: SHIPPED | OUTPATIENT
Start: 2021-01-18 | End: 2021-02-15 | Stop reason: SDUPTHER

## 2021-01-18 RX ORDER — METOPROLOL SUCCINATE 25 MG/1
25 TABLET, EXTENDED RELEASE ORAL
Status: DISCONTINUED | OUTPATIENT
Start: 2021-01-19 | End: 2021-01-18 | Stop reason: HOSPADM

## 2021-01-18 RX ADMIN — ASPIRIN 81 MG: 81 TABLET, COATED ORAL at 08:29

## 2021-01-18 RX ADMIN — METOPROLOL TARTRATE 50 MG: 25 TABLET, FILM COATED ORAL at 08:29

## 2021-01-18 RX ADMIN — SODIUM CHLORIDE, PRESERVATIVE FREE 10 ML: 5 INJECTION INTRAVENOUS at 08:29

## 2021-01-18 RX ADMIN — AMIODARONE HYDROCHLORIDE 0.5 MG/MIN: 1.8 INJECTION, SOLUTION INTRAVENOUS at 05:30

## 2021-01-18 NOTE — PLAN OF CARE
Goal Outcome Evaluation:  Plan of Care Reviewed With: patient  Progress: no change  Outcome Summary: VSS, rested well, continues to be afib on telemetry, BP stable with amiodarone drip

## 2021-01-18 NOTE — PAYOR COMM NOTE
"TO:FUENTES  FROM:MISBAH MARCOS RN PHONE [phone]  INPT CLINICALS REF# [id number]    Diana Morales (55 y.o. Female)     Date of Birth Social Security Number Address Home Phone MRN    1965  [address] [facility] [address] [phone] [id number]    [facility] Marital Status          [place]        Admission Date Admission Type Admitting Provider Attending Provider Department, Room/Bed    21 Emergency Raul Sheffield DO Shields, Morgan Blanton, DO [facility] MED SURG  3, 305/    Discharge Date Discharge Disposition Discharge Destination                       Attending Provider: Raul Sheffield DO    Allergies: No Known Allergies    Isolation: None   Infection: None   Code Status: CPR    Ht: 162.6 cm (64\")   Wt: 71.8 kg (158 lb 4.6 oz)    Admission Cmt: None   Principal Problem: Atrial fibrillation with rapid ventricular response (CMS/HCC) [I48.91]                 Active Insurance as of 2021     Primary Coverage     Payor Plan Insurance Group Employer/Plan Group    [employer] GEHA - [id number]     Payor Plan Address Payor Plan Phone Number Payor Plan Fax Number Effective Dates    [address]   2016 - None Entered    [facility] [id number]       Subscriber Name Subscriber Birth Date Member ID       JACKIE MORALES 1969 [id number]                 Emergency Contacts      (Rel.) Home Phone Work Phone Mobile Phone    Jackie Morales (Spouse) [phone] -- [phone]               History & Physical      Raquel Craven DO at 21 1920              [facility] HOSPITALIST   HISTORY AND PHYSICAL      Name:  Diana Morales   Age:  55 y.o.  Sex:  female  :  1965  MRN:  [id number]   Visit Number:  [id number]  Admission Date:  2021  Date Of Service:  21  Primary Care Physician:  Deon Garcia MD    Chief Complaint: Chest pressure and palpitations    History Of " Presenting Illness:  55 F history of atrial fibrillation on aspirin and metoprolol presented to the ED complaining of chest pressure and palpitations onset this afternoon around 4 PM.  Patient was cleaning at her usual pace with no additional or extra exertion per the norm when she felt chest pressure midsternal, and palpitations come on.  It was nonradiating, did not have any associated dyspnea, nausea, diaphoresis, or any other acute symptoms.  It has improved by arrival to the ER after being started on diltiazem drip.  She did miss her metoprolol this morning.  She denies any previous episode of experiencing chest pressure but has felt palpitations in the past.  In the ED, after initiation of the diltiazem drip she became hypotensive and was started on a fluid bolus.  The diltiazem rate has been slowed.    Review Of Systems: A full 10 point review of systems was performed and all pertinent positives and negatives are noted in the HPI.     Past Medical History:  Past Medical History:   Diagnosis Date   • Arthritis    • Diverticulosis 2017   • Hyperlipidemia    • Influenza A 2019   • Intermittent palpitations    • Lower back pain    • Lumbar spondylosis    • MVP (mitral valve prolapse)    • Osteopenia    • Paroxysmal atrial fibrillation with RVR (CMS/HCC) 07/10/2019   • Prediabetes    • Sinus bradycardia    • Snores    • Wears glasses      Past Surgical history:  Past Surgical History:   Procedure Laterality Date   •  SECTION     • COLONOSCOPY N/A 2017    Procedure: COLONOSCOPY;  Surgeon: Michele Marion MD;  Location: [facility] ENDOSCOPY;  Service:    • KNEE ARTHROSCOPY Left 2017    Procedure: KNEE ARTHROSCOPY LEFT  WITH PARTIAL MEDIAL and  LATERAL  MENISCECTOMY;  Surgeon: Dusty Dangelo MD;  Location: [facility] OR;  Service:    • KNEE ARTHROSCOPY Right 3/4/2020    Procedure: KNEE ARTHROSCOPY RIGHT WITH PARTIAL MEDIAL MENISCECTOMY;  Surgeon: Dusty Dangelo MD;  Location: [facility] OR;   Service: Orthopedics;  Laterality: Right;   • SUBTOTAL HYSTERECTOMY  11/2003   • TRIGGER FINGER RELEASE Right     3RD FINGER     Social History:  Social History     Socioeconomic History   • Marital status:      Spouse name: Not on file   • Number of children: Not on file   • Years of education: Not on file   • Highest education level: Not on file   Tobacco Use   • Smoking status: Never Smoker   • Smokeless tobacco: Never Used   Substance and Sexual Activity   • Alcohol use: Yes     Comment: SOCIAL   • Drug use: No   • Sexual activity: Defer     Family History:  Family History   Problem Relation Age of Onset   • Pancreatic cancer Mother    • Colon cancer Neg Hx    • Liver cancer Neg Hx    • Liver disease Neg Hx    • Stomach cancer Neg Hx    • Esophageal cancer Neg Hx        Allergies:  Patient has no known allergies.    Home Medications:  Prior to Admission Medications     Prescriptions Last Dose Informant Patient Reported? Taking?    aspirin 81 MG EC tablet   No No    Take 1 tablet by mouth Daily.    atorvastatin (LIPITOR) 40 MG tablet   No No    TAKE 1 TABLET BY MOUTH EVERY DAY AT NIGHT    Calcium Carbonate-Vit D-Min (CALCIUM 1200 PO)  Self Yes No    Take 1,200 mg by mouth.    metoprolol succinate XL (TOPROL-XL) 25 MG 24 hr tablet   No No    Take 1 tablet by mouth Daily. 200001    Vitamin D, Cholecalciferol, (CHOLECALCIFEROL) 400 units tablet  Spouse/Significant Other Yes No    Take 400 Units by mouth Daily.             ED Medications:  Medications   dilTIAZem (CARDIZEM) 100 mg in 100 mL NS infusion (ADV) (5 mg/hr Intravenous Rate/Dose Change 1/16/21 1836)   metoprolol tartrate (LOPRESSOR) tablet 25 mg (has no administration in time range)   sodium chloride 0.9 % bolus 1,000 mL (1,000 mL Intravenous New Bag 1/16/21 1913)   metoprolol tartrate (LOPRESSOR) injection 5 mg (5 mg Intravenous Given 1/16/21 1726)   metoprolol tartrate (LOPRESSOR) injection 5 mg (5 mg Intravenous Given 1/16/21 1739)     Vital  Signs:  Temp:  [97.2 °F (36.2 °C)] 97.2 °F (36.2 °C)  Heart Rate:  [109-170] 121  Resp:  [16] 16  BP: ()/(52-96) 90/78        01/16/21  1707   Weight: 70.3 kg (155 lb)     Body mass index is 26.61 kg/m².    Physical Exam:  General: NAD, resting in bed  HEENT: EOMI, NC/AT  Heart: Irregularly irregular, tachycardic  Lungs: nonlabored  Abdomen: Soft, nondistended, nontender  Extremities: No edema  Neurological: A&O x3, moves all extremities  Psychological: Mood and affect appropriate, speech is coherent  Skin: warm, dry    Laboratory data: I have reviewed the labs done in the emergency room.    Results from last 7 days   Lab Units 01/16/21  1805   SODIUM mmol/L 139   POTASSIUM mmol/L 3.9   CHLORIDE mmol/L 105   CO2 mmol/L 20.3*   BUN mg/dL 22*   CREATININE mg/dL 0.77   CALCIUM mg/dL 9.9   BILIRUBIN mg/dL 0.2   ALK PHOS U/L 121*   ALT (SGPT) U/L 18   AST (SGOT) U/L 20   GLUCOSE mg/dL 101*     Results from last 7 days   Lab Units 01/16/21  1805   WBC 10*3/mm3 9.91   HEMOGLOBIN g/dL 14.3   HEMATOCRIT % 43.0   PLATELETS 10*3/mm3 223         Results from last 7 days   Lab Units 01/16/21  1805   TROPONIN T ng/mL <0.010     Pain Management Panel     There is no flowsheet data to display.        EKG:  Reviewed by myself reveals atrial fibrillation with RVR, rate 163, QTc 332, inferolateral ST depressions, ST elevation in aVR, abnormal EKG    Radiology:  Imaging Results (Last 72 Hours)     Procedure Component Value Units Date/Time    XR Chest 1 View [[id number]] Resulted: 01/16/21 1844     Updated: 01/16/21 1844      CXR personally reviewed by myself reveals no acute pathology  Assessment:    Atrial fibrillation with rapid ventricular response (CMS/HCC)    Plan:  -administer Lopressor now, titrate off dilt gtt given the hypotension  -cardioversion as indicated   -r/o ischemia; repeat Tt, obtain electrolytes, echo, cardiology eval in AM  -[id number] score is 1, defer AC  -full code, inpt, high risk    Raquel Craven,    21  19:29 EST    Dictated utilizing Dragon dictation.      Electronically signed by Raquel Craven DO at 21 1929          Emergency Department Notes      Carson Lomax MD at 21 1822          Subjective   55-year-old female presents with a rapid heart rate, she dates she was cleaning her home, and the heart rate started about an hour prior to arrival. She does take a beta-blocker and aspirin, she has a history of A. fib. She has had this in the past and she can usually take a deep breath and make the heart rate slowed down however they did not work this time. She states she feels a little bit short of breath but otherwise no other symptoms.      History provided by:  Patient   used: No        Review of Systems   Respiratory: Positive for shortness of breath.    Cardiovascular: Positive for palpitations.        Rapid heart rate   All other systems reviewed and are negative.      Past Medical History:   Diagnosis Date   • Arthritis    • Diverticulosis 2017   • Hyperlipidemia    • Influenza A 2019   • Intermittent palpitations    • Lower back pain    • Lumbar spondylosis    • MVP (mitral valve prolapse)    • Osteopenia    • Paroxysmal atrial fibrillation with RVR (CMS/HCC) 07/10/2019   • Prediabetes    • Sinus bradycardia    • Snores    • Wears glasses        No Known Allergies    Past Surgical History:   Procedure Laterality Date   •  SECTION     • COLONOSCOPY N/A 2017    Procedure: COLONOSCOPY;  Surgeon: Michele Marion MD;  Location: [facility] ENDOSCOPY;  Service:    • KNEE ARTHROSCOPY Left 2017    Procedure: KNEE ARTHROSCOPY LEFT  WITH PARTIAL MEDIAL and  LATERAL  MENISCECTOMY;  Surgeon: Dusty Dangelo MD;  Location: [facility] OR;  Service:    • KNEE ARTHROSCOPY Right 3/4/2020    Procedure: KNEE ARTHROSCOPY RIGHT WITH PARTIAL MEDIAL MENISCECTOMY;  Surgeon: Dusty Dangelo MD;  Location: [facility] OR;  Service: Orthopedics;  Laterality: Right;   •  SUBTOTAL HYSTERECTOMY  11/2003   • TRIGGER FINGER RELEASE Right     3RD FINGER       Family History   Problem Relation Age of Onset   • Pancreatic cancer Mother    • Colon cancer Neg Hx    • Liver cancer Neg Hx    • Liver disease Neg Hx    • Stomach cancer Neg Hx    • Esophageal cancer Neg Hx        Social History     Socioeconomic History   • Marital status:      Spouse name: Not on file   • Number of children: Not on file   • Years of education: Not on file   • Highest education level: Not on file   Tobacco Use   • Smoking status: Never Smoker   • Smokeless tobacco: Never Used   Substance and Sexual Activity   • Alcohol use: Yes     Comment: SOCIAL   • Drug use: No   • Sexual activity: Defer           Objective   Physical Exam  Vitals signs and nursing note reviewed.   Constitutional:       Appearance: She is well-developed.   HENT:      Head: Normocephalic.   Neck:      Musculoskeletal: Normal range of motion and neck supple.   Cardiovascular:      Rate and Rhythm: Normal rate and regular rhythm.   Pulmonary:      Effort: Pulmonary effort is normal.      Breath sounds: Normal breath sounds.   Abdominal:      Palpations: Abdomen is soft.   Musculoskeletal: Normal range of motion.   Skin:     General: Skin is warm and dry.   Neurological:      Mental Status: She is alert and oriented to person, place, and time.      Deep Tendon Reflexes: Reflexes are normal and symmetric.         Procedures          ED Course  ED Course as of Jan 17 0703   Sat Jan 16, 2021   1723 Discussed with Dr. Crouch, if patient hemodynamically stable he recommended getting beta-blockers or Cardizem    [CS]   1901 Discussed with Dr. Craven  he accepted the patient for admission    [CS]      ED Course User Index  [CS] Harley Rodriguez Jr., QUIANA                                           MDM  Number of Diagnoses or Management Options  Paroxysmal atrial fibrillation (CMS/HCC): new and requires workup  Diagnosis management comments: Patient  required multiple doses of rate control medications and ended up on cardizem infusion.     30 minutes of critical care provided. This time excludes other billable procedures. Time does include preparation of documents, medical consultations, review of old records, and direct bedside care. Patient was at high risk for life-threatening deterioration due to atrial fibrillation with rapid ventricular rate requiring multiple IV medications.          Amount and/or Complexity of Data Reviewed  Discuss the patient with other providers: yes  Independent visualization of images, tracings, or specimens: yes    Risk of Complications, Morbidity, and/or Mortality  Presenting problems: high  Diagnostic procedures: high  Management options: high    Critical Care  Total time providing critical care: 30-74 minutes      Final diagnoses:   Paroxysmal atrial fibrillation (CMS/[facility])            Harley Rodriguez Jr., PA-C  01/16/21 1908       Carson Lomax MD  01/17/21 0705      Electronically signed by Carson Lomax MD at 01/17/21 0705     Kait Rudolph RN at 01/16/21 1824        PCXR complete at bedside.     Kait Rudolhp RN  01/16/21 1824      Electronically signed by Kait Rudolph RN at 01/16/21 1824     Kait Rudolph RN at 01/16/21 1830        Patient's blood pressure 79/46.  Harley Rodriguez PA-c notified and new orders received to decrease diltiazem rate to 5mg/hr and to continue to monitor blood pressure.      Kait Rudolph RN  01/16/21 1837      Electronically signed by Kait Rudolph RN at 01/16/21 1837     Dusty Rutledge at 01/16/21 1913        Per House Supervisor, patient will be going to 305     Dusty Rutledge  01/16/21 1913      Electronically signed by Dusty Rutledge at 01/16/21 1913     Diane Rowland, RN at 01/16/21 1926        Report to JIMBO Bender by Kait Rudolph.      Diane Rowland, RN  01/16/21 1926      Electronically signed by Diane Rowland, RN at 01/16/21 1926       Vital  Signs (last day)     Date/Time   Temp   Temp src   Pulse   Resp   BP   Patient Position   SpO2    01/18/21 0700   98.1 (36.7)   Oral   104   18   103/88   Sitting   98    01/18/21 0500   --   --   106   --   101/76   Lying   97    01/18/21 0344   98.1 (36.7)   Oral   96   18   115/79   Lying   98    01/18/21 0201   --   --   85   --   91/52   --   98    01/18/21 0102   --   --   101   --   --   --   98    01/18/21 0101   --   --   --   --   110/59   --   --    01/18/21 0001   --   --   --   --   111/78   --   --    01/18/21 0000   --   --   114   --   --   --   --    01/17/21 2343   --   --   110   --   --   --   --    01/17/21 2340   97.9 (36.6)   Oral   89   18   111/82   Lying   97    01/17/21 2301   --   --   --   --   98/70   --   --    01/17/21 2300   --   --   99   --   --   --   --    01/17/21 2201   --   --   99   --   96/80   --   95    01/17/21 2100   --   --   --   --   105/85   Lying   97    01/17/21 2001   --   --   96   --   106/76   Lying   96    01/17/21 1915   --   --   114   --   93/74   --   97    01/17/21 1900   --   --   104   --   (!) 79/64   --   97    01/17/21 1845   --   --   104   --   95/63   --   97    01/17/21 1830   --   --   92   --   102/63   --   96    01/17/21 1815   --   --   113   --   100/72   --   96    01/17/21 1800   --   --   87   --   105/71   --   97    01/17/21 1745   --   --   100   --   106/81   --   97    01/17/21 1730   --   --   108   --   101/91   --   95    01/17/21 1715   --   --   90   --   109/82   --   94    01/17/21 1700   --   --   114   --   112/94   --   97    01/17/21 1645   --   --   104   --   96/76   --   96    01/17/21 1630   --   --   92   --   (!) 82/57   --   97    01/17/21 1615   --   --   115   --   101/62   --   97    01/17/21 1600   --   --   89   --   (!) 82/59   --   96    01/17/21 1545   --   --   109   --   (!) 71/39   --   95    01/17/21 1530   --   --   106   --   99/86   --   96    01/17/21 1515   --   --   107   --   107/79   --   95     01/17/21 1500   --   --   116   --   105/67   --   96    01/17/21 1445   --   --   107   --   100/76   --   95    01/17/21 1440   98.1 (36.7)   Oral   --   18   --   Lying   --    01/17/21 1430   --   --   104   --   (!) 88/57   --   97    01/17/21 1415   --   --   94   --   96/74   --   98    01/17/21 1400   --   --   103   --   109/60   --   96    01/17/21 1345   --   --   97   --   99/70   --   96    01/17/21 1330   --   --   91   --   104/69   --   97    01/17/21 1315   --   --   100   --   110/93   --   97    01/17/21 1300   --   --   107   --   94/73   --   96    01/17/21 1245   --   --   89   --   95/57   --   95    01/17/21 1230   --   --   68   --   108/72   --   97    01/17/21 1215   --   --   87   --   115/88   --   95    01/17/21 1200   --   --   71   --   105/82   --   98    01/17/21 1145   --   --   60   --   95/74   --   95    01/17/21 1130   --   --   65   --   99/76   --   97    01/17/21 1115   --   --   83   --   100/69   --   98    01/17/21 1100   --   --   --   --   119/83   --   97    01/17/21 1045   --   --   100   --   --   --   97    01/17/21 1030   --   --   119   --   --   --   97    01/17/21 1015   --   --   109   --   --   --   96    01/17/21 1011   98.1 (36.7)   Oral   --   18   105/79   Sitting   --    01/17/21 1000   --   --   90   --   105/79   --   96    01/17/21 0945   --   --   98   --   --   --   97    01/17/21 0930   --   --   104   --   --   --   96    01/17/21 0915   --   --   (!) 135   --   --   --   97    01/17/21 0900   --   --   92   --   105/57   --   98    01/17/21 0838   --   --   (!) 124   --   109/67   --   --    01/17/21 0738   98.9 (37.2)   Oral   118   18   (!) 75/62   Sitting   --    01/17/21 0600   --   --   117   --   90/55   --   96    01/17/21 0525   --   --   117   --   --   --   97    01/17/21 0500   --   --   117   --   108/81   --   98    01/17/21 0400   --   --   101   --   (!) 84/71   --   97    01/17/21 0315   97.5 (36.4)   Oral   (!) 121   18   --   --    97    01/17/21 0300   --   --   109   --   90/70   --   95    01/17/21 0200   --   --   (!) 127   --   97/84   --   96    01/17/21 0100   --   --   95   --   94/76   --   96    01/17/21 0045   --   --   --   --   99/76   --   98    01/17/21 0015   --   --   107   --   104/60   --   96    01/17/21 0000   --   --   91   --   93/80   --   96                Current Facility-Administered Medications   Medication Dose Route Frequency Provider Last Rate Last Admin   • acetaminophen (TYLENOL) tablet 650 mg  650 mg Oral Q4H PRN Raquel Craven,         Or   • acetaminophen (TYLENOL) 160 MG/5ML solution 650 mg  650 mg Oral Q4H PRN Raquel Craven,         Or   • acetaminophen (TYLENOL) suppository 650 mg  650 mg Rectal Q4H PRN Raquel Craven, DO       • amiodarone 360 mg in 200 mL D5W infusion  0.5 mg/min Intravenous Continuous Jaylon Crouch MD 16.67 mL/hr at 01/18/21 0530 0.5 mg/min at 01/18/21 0530   • aspirin EC tablet 81 mg  81 mg Oral Daily Raquel Craven, DO   81 mg at 01/18/21 0829   • atorvastatin (LIPITOR) tablet 40 mg  40 mg Oral Nightly Raquel Craven, DO   40 mg at 01/17/21 2022   • enoxaparin (LOVENOX) syringe 40 mg  40 mg Subcutaneous Nightly Raquel Craven, DO   40 mg at 01/17/21 2022   • metoprolol tartrate (LOPRESSOR) tablet 50 mg  50 mg Oral Q12H Raquel Craven, DO   50 mg at 01/18/21 0829   • ondansetron (ZOFRAN) injection 4 mg  4 mg Intravenous Q6H PRN Raquel Craven, DO       • sodium chloride 0.9 % flush 10 mL  10 mL Intravenous Q12H Raquel Craven, DO   10 mL at 01/18/21 0829   • sodium chloride 0.9 % flush 10 mL  10 mL Intravenous PRN Raquel Craven,            Lab Results (last 24 hours)     ** No results found for the last 24 hours. **        Imaging Results (Last 24 Hours)     ** No results found for the last 24 hours. **        Physician Progress Notes (last 24 hours) (Notes from 01/17/21 1009 through 01/18/21 1009)    No notes of this type exist for this encounter.            Consult Notes (last 24 hours)  "(Notes from 01/17/21 1009 through 01/18/21 1009)      Jaylon Crouch MD at 01/17/21 1013      Consult Orders    1. Inpatient Cardiology Consult [[id number]] ordered by Raquel Craven DO at 01/17/21 0709                    [facility] Cardiology Consult Note    Diana Flores  1965  [id number]  01/17/21    Requesting Physician: Raquel Craven Do    Chief Complaint: Palpitations    History of Present Illness:   Mrs. Diana Flores is a 55 y.o. female who is being seen by Cardiology for evaluation of atrial fibrillation with rapid ventricular rates.  The patient has a past medical history significant for prediabetes and hyperlipidemia.  She has a past cardiac history significant for paroxysmal atrial fibrillation, with the initial episode occurring in 7/19.  She was scheduled for cardioversion at that time, however converted to sinus rhythm immediately prior to cardioversion.  She subsequently underwent an MPS in 9/19, with no evidence of inducible ischemia.  The patient reports she has not had any recurrent episodes of PAF until yesterday evening at approximately 4 PM.  The patient reports she was doing some light cleaning, when she noted acute onset palpitations described as a \"fast and irregular\" heart rate.  This was associated with mild chest pressure.  She denies any significant shortness of breath.  Given persistence of her palpitations and chest discomfort, she presented to the emergency department for evaluation.    Upon initial evaluation the emergency department, the patient was found to be in atrial fibrillation with rapid ventricular rates in the 160s.  A laboratory evaluation including troponin x2 was unremarkable.  She was given beta-blocker and subsequently a diltiazem drip in the emergency department.  The patient did have improvement in her heart rate, however the size of drip was discontinued due to hypotension.  This morning, the patient continues to report palpitations.  " On telemetry, she remains in atrial fibrillation with ventricular rates in the 120s.  Cardiology has been consulted for further recommendations.    Prior Cardiac Testin. Last Coronary Angio: None  2. Prior Stress Testing: MPS 2019   1.  No evidence of inducible ischemia  3. Last Echo: 2019   1.  Normal left ventricular systolic function, LVEF greater than 55%   2.  Trace MR and TR  4. Prior Holter Monitor: None    Review of Systems:   Review of Systems   Constitutional: Negative for activity change, appetite change, chills, diaphoresis, fatigue, fever, unexpected weight gain and unexpected weight loss.   Eyes: Negative for blurred vision and double vision.   Respiratory: Positive for chest tightness. Negative for cough, shortness of breath and wheezing.    Cardiovascular: Positive for palpitations. Negative for chest pain and leg swelling.   Gastrointestinal: Negative for abdominal pain, anal bleeding, blood in stool and GERD.   Endocrine: Negative for cold intolerance and heat intolerance.   Genitourinary: Negative for hematuria.   Neurological: Negative for dizziness, syncope, weakness and light-headedness.   Hematological: Does not bruise/bleed easily.   Psychiatric/Behavioral: Negative for depressed mood and stress. The patient is not nervous/anxious.        Past Medical History:   Past Medical History:   Diagnosis Date   • Arthritis    • Diverticulosis 2017   • Hyperlipidemia    • Influenza A 2019   • Intermittent palpitations    • Lower back pain    • Lumbar spondylosis    • MVP (mitral valve prolapse)    • Osteopenia    • Paroxysmal atrial fibrillation with RVR (CMS/HCC) 07/10/2019   • Prediabetes    • Sinus bradycardia    • Snores    • Wears glasses        Past Surgical History:   Past Surgical History:   Procedure Laterality Date   •  SECTION     • COLONOSCOPY N/A 2017    Procedure: COLONOSCOPY;  Surgeon: Michele Marion MD;  Location: [facility] ENDOSCOPY;  Service:    •  KNEE ARTHROSCOPY Left 7/21/2017    Procedure: KNEE ARTHROSCOPY LEFT  WITH PARTIAL MEDIAL and  LATERAL  MENISCECTOMY;  Surgeon: Dusty Dangelo MD;  Location: [facility];  Service:    • KNEE ARTHROSCOPY Right 3/4/2020    Procedure: KNEE ARTHROSCOPY RIGHT WITH PARTIAL MEDIAL MENISCECTOMY;  Surgeon: Dusty Dangelo MD;  Location: [facility];  Service: Orthopedics;  Laterality: Right;   • SUBTOTAL HYSTERECTOMY  11/2003   • TRIGGER FINGER RELEASE Right     3RD FINGER       Family History:   Family History   Problem Relation Age of Onset   • Pancreatic cancer Mother    • Colon cancer Neg Hx    • Liver cancer Neg Hx    • Liver disease Neg Hx    • Stomach cancer Neg Hx    • Esophageal cancer Neg Hx        Social History:   Social History     Socioeconomic History   • Marital status:      Spouse name: Not on file   • Number of children: Not on file   • Years of education: Not on file   • Highest education level: Not on file   Tobacco Use   • Smoking status: Never Smoker   • Smokeless tobacco: Never Used   Substance and Sexual Activity   • Alcohol use: Yes     Comment: SOCIAL   • Drug use: No   • Sexual activity: Defer       Medications:     Current Facility-Administered Medications:   •  acetaminophen (TYLENOL) tablet 650 mg, 650 mg, Oral, Q4H PRN **OR** acetaminophen (TYLENOL) 160 MG/5ML solution 650 mg, 650 mg, Oral, Q4H PRN **OR** acetaminophen (TYLENOL) suppository 650 mg, 650 mg, Rectal, Q4H PRN, Herber, Umar, DO  •  aspirin EC tablet 81 mg, 81 mg, Oral, Daily, Herber, Julisaar, DO, 81 mg at 01/17/21 0839  •  atorvastatin (LIPITOR) tablet 40 mg, 40 mg, Oral, Nightly, HerberJulisa louisar, DO, 40 mg at 01/16/21 2044  •  dilTIAZem (CARDIZEM) 100 mg in 100 mL NS infusion (ADV), 5-15 mg/hr, Intravenous, Titrated, Raquel Craven, DO, Stopped at 01/16/21 2240  •  enoxaparin (LOVENOX) syringe 40 mg, 40 mg, Subcutaneous, Nightly, Raquel Craven, DO, 40 mg at 01/16/21 2044  •  metoprolol tartrate (LOPRESSOR) tablet 50 mg, 50 mg, Oral,  Q12H, Herber, Umar, DO, 50 mg at 01/17/21 0838  •  ondansetron (ZOFRAN) injection 4 mg, 4 mg, Intravenous, Q6H PRN, Herber, Umar, DO  •  sodium chloride 0.9 % flush 10 mL, 10 mL, Intravenous, Q12H, Herber, Umar, DO, 10 mL at 01/17/21 0840  •  sodium chloride 0.9 % flush 10 mL, 10 mL, Intravenous, PRN, Herber, Umar, DO    Allergies:   No Known Allergies    Physical Exam:  Vital Signs:   Vitals:    01/17/21 0525 01/17/21 0600 01/17/21 0738 01/17/21 0838   BP:  90/55 (!) 75/62 109/67   BP Location:   Right arm    Patient Position:   Sitting    Pulse: 117 117 118 (!) 124   Resp:   18    Temp:   98.9 °F (37.2 °C)    TempSrc:   Oral    SpO2: 97% 96%     Weight: 71.8 kg (158 lb 4.6 oz)      Height:           Physical Exam  Vitals signs and nursing note reviewed.   Constitutional:       General: She is not in acute distress.     Appearance: Normal appearance. She is well-developed. She is not diaphoretic.   HENT:      Head: Normocephalic and atraumatic.   Eyes:      General: No scleral icterus.     Pupils: Pupils are equal, round, and reactive to light.   Neck:      Musculoskeletal: Neck supple. No muscular tenderness.      Trachea: No tracheal deviation.   Cardiovascular:      Rate and Rhythm: Tachycardia present. Rhythm irregular.      Heart sounds: Normal heart sounds. No murmur. No friction rub. No gallop.       Comments: Normal JVD.  Pulmonary:      Effort: Pulmonary effort is normal. No respiratory distress.      Breath sounds: Normal breath sounds. No stridor. No wheezing or rales.   Chest:      Chest wall: No tenderness.   Abdominal:      General: Bowel sounds are normal. There is no distension.      Palpations: Abdomen is soft.      Tenderness: There is no abdominal tenderness. There is no guarding or rebound.   Musculoskeletal: Normal range of motion.         General: No swelling.   Lymphadenopathy:      Cervical: No cervical adenopathy.   Skin:     General: Skin is warm and dry.      Findings: No erythema.    Neurological:      General: No focal deficit present.      Mental Status: She is alert and oriented to person, place, and time.   Psychiatric:         Mood and Affect: Mood normal.         Behavior: Behavior normal.         Results Review:   Results from last 7 days   Lab Units 01/16/21  1805   SODIUM mmol/L 139   POTASSIUM mmol/L 3.9   CHLORIDE mmol/L 105   CO2 mmol/L 20.3*   BUN mg/dL 22*   CREATININE mg/dL 0.77   CALCIUM mg/dL 9.9   BILIRUBIN mg/dL 0.2   ALK PHOS U/L 121*   ALT (SGPT) U/L 18   AST (SGOT) U/L 20   GLUCOSE mg/dL 101*     Results from last 7 days   Lab Units 01/16/21  2204 01/16/21  1805   TROPONIN T ng/mL <0.010 <0.010     Results from last 7 days   Lab Units 01/16/21  1805   WBC 10*3/mm3 9.91   HEMOGLOBIN g/dL 14.3   HEMATOCRIT % 43.0   PLATELETS 10*3/mm3 223         Results from last 7 days   Lab Units 01/16/21  1805   MAGNESIUM mg/dL 2.0           I personally viewed and interpreted the patient's EKG/Telemetry data     Assessment / Plan:     1.  Paroxysmal atrial fibrillation with rapid trickle rates  --Initial episode of PAF with RVR occurred in 7/19, no recurrence until now  --Current episode appears to have onset at approximately 4 PM yesterday  --Did not tolerate diltiazem drip overnight due to hypotension  --Given onset of A. fib with RVR is within 48 hours, will start IV amiodarone to attempt chemical cardioversion  --If sinus rhythm is restored with IV amiodarone, would then discontinue amiodarone and discharge home with follow-up in the cardiology clinic next week to consider initiation of flecainide for rhythm control strategy  --If unable to achieve sinus rhythm with IV amiodarone, will then make n.p.o. after midnight for consideration of GALI with cardioversion  --Continue home metoprolol on discharge  --While [id number] is 1, given potential need for electrocardioversion recommend initiation of Eliquis.  Will reevaluate need for therapeutic anticoagulation upon outpatient  follow-up      Thank you for allowing me to participate in the care of your patient. Please do not hesitate to contact me with additional questions or concerns.     CORBY Crouch MD  Interventional Cardiology   01/17/21  10:13 EST    Electronically signed by Jaylon Crouch MD at 01/17/21 0711

## 2021-01-18 NOTE — PROGRESS NOTES
[facility] Cardiology IP Progress Note    Diana Flores  1965  [id number]  01/18/21    Subjective:   Mrs. Diana Flores is a 55 y.o. female seen in follow-up for paroxysmal atrial fibrillation with rapid ventricular rates.  No acute overnight events.  On review telemetry, converted to sinus rhythm at approximately 9:30 AM this morning.  Currently, the patient denies palpitations.  No recurrent episodes of chest pain or chest discomfort.  No new complaints at this time.    Review of Systems:   Review of Systems   Constitutional: Negative for chills, diaphoresis, fatigue and fever.   Respiratory: Negative for cough, chest tightness, shortness of breath and wheezing.    Cardiovascular: Negative for chest pain, palpitations and leg swelling.   Gastrointestinal: Negative for abdominal pain and GERD.   Neurological: Negative for dizziness, syncope, weakness and light-headedness.       I have reviewed and/or updated the patient's past medical, past surgical, family history, social history, problem list and allergies as appropriate in the chart.     Physical Exam:  Vital Signs:   Vitals:    01/18/21 0201 01/18/21 0344 01/18/21 0500 01/18/21 0700   BP: 91/52 115/79 101/76 103/88   BP Location:  Right arm Right arm Right arm   Patient Position:  Lying Lying Sitting   Pulse: 85 96 106 104   Resp:  18  18   Temp:  98.1 °F (36.7 °C)  98.1 °F (36.7 °C)   TempSrc:  Oral  Oral   SpO2: 98% 98% 97% 98%   Weight:       Height:           Physical Exam  Vitals signs and nursing note reviewed.   Constitutional:       General: She is not in acute distress.     Appearance: Normal appearance. She is well-developed. She is not diaphoretic.   HENT:      Head: Normocephalic and atraumatic.   Eyes:      General: No scleral icterus.     Pupils: Pupils are equal, round, and reactive to light.   Neck:      Musculoskeletal: Neck supple. No muscular tenderness.      Trachea: No tracheal deviation.    Cardiovascular:      Rate and Rhythm: Normal rate and regular rhythm.      Heart sounds: Normal heart sounds. No murmur. No friction rub. No gallop.       Comments: Normal JVD.  Pulmonary:      Effort: Pulmonary effort is normal. No respiratory distress.      Breath sounds: Normal breath sounds. No stridor. No wheezing or rales.   Chest:      Chest wall: No tenderness.   Abdominal:      General: Bowel sounds are normal. There is no distension.      Palpations: Abdomen is soft.      Tenderness: There is no abdominal tenderness. There is no guarding or rebound.   Musculoskeletal: Normal range of motion.         General: No swelling.   Lymphadenopathy:      Cervical: No cervical adenopathy.   Skin:     General: Skin is warm and dry.      Findings: No erythema.   Neurological:      General: No focal deficit present.      Mental Status: She is alert and oriented to person, place, and time.   Psychiatric:         Mood and Affect: Mood normal.         Behavior: Behavior normal.         Results Review:   Results from last 7 days   Lab Units 01/16/21  1805   SODIUM mmol/L 139   POTASSIUM mmol/L 3.9   CHLORIDE mmol/L 105   CO2 mmol/L 20.3*   BUN mg/dL 22*   CREATININE mg/dL 0.77   CALCIUM mg/dL 9.9   BILIRUBIN mg/dL 0.2   ALK PHOS U/L 121*   ALT (SGPT) U/L 18   AST (SGOT) U/L 20   GLUCOSE mg/dL 101*     Results from last 7 days   Lab Units 01/16/21  2204 01/16/21  1805   TROPONIN T ng/mL <0.010 <0.010     Results from last 7 days   Lab Units 01/16/21  1805   WBC 10*3/mm3 9.91   HEMOGLOBIN g/dL 14.3   HEMATOCRIT % 43.0   PLATELETS 10*3/mm3 223         Results from last 7 days   Lab Units 01/16/21  1805   MAGNESIUM mg/dL 2.0           I personally viewed and interpreted the patient's EKG/Telemetry data     Medications:   )  Current Facility-Administered Medications:   •  acetaminophen (TYLENOL) tablet 650 mg, 650 mg, Oral, Q4H PRN **OR** acetaminophen (TYLENOL) 160 MG/5ML solution 650 mg, 650 mg, Oral, Q4H PRN **OR**  acetaminophen (TYLENOL) suppository 650 mg, 650 mg, Rectal, Q4H PRN, Raquel Craven DO  •  [COMPLETED] amiodarone in dextrose 5% (NEXTERONE) loading dose 150mg/100mL, 150 mg, Intravenous, Once, 150 mg at 21 1052 **FOLLOWED BY** [] amiodarone 360 mg in 200 mL D5W infusion, 1 mg/min, Intravenous, Continuous, Stopped at 21 1723 **FOLLOWED BY** amiodarone 360 mg in 200 mL D5W infusion, 0.5 mg/min, Intravenous, Continuous, CookJaylon MD, Last Rate: 16.67 mL/hr at 21, 0.5 mg/min at 21  •  aspirin EC tablet 81 mg, 81 mg, Oral, Daily, Raqule Craven DO, 81 mg at 21 08  •  atorvastatin (LIPITOR) tablet 40 mg, 40 mg, Oral, Nightly, Raquel Craven DO, 40 mg at 21  •  enoxaparin (LOVENOX) syringe 40 mg, 40 mg, Subcutaneous, Nightly, Raquel Craven DO, 40 mg at 21  •  metoprolol tartrate (LOPRESSOR) tablet 50 mg, 50 mg, Oral, Q12H, Raquel Craven, , 50 mg at 21 08  •  ondansetron (ZOFRAN) injection 4 mg, 4 mg, Intravenous, Q6H PRN, Raquel Craven DO  •  sodium chloride 0.9 % flush 10 mL, 10 mL, Intravenous, Q12H, Raquel Craven, , 10 mL at 21  •  sodium chloride 0.9 % flush 10 mL, 10 mL, Intravenous, PRN, Raquel Craven DO    Assessment / Plan:     1.  Paroxysmal atrial fibrillation with rapid ventricular rates  --Initial episode of PAF with RVR occurred in , no recurrence until current presentation  --Converted to sinus rhythm at approximately 9:30 AM this morning  --Discontinue amiodarone drip  --Given bradycardia, change metoprolol to 25 mg p.o. daily on discharge  --Start flecainide 50 mg p.o. twice daily on   --[id number] is 1, however will start 3-4-week course of anticoagulation with Eliquis and reconsider upon outpatient follow-up  --The patient has been instructed to present to the cardiology clinic on  for repeat ECG after initiation of flecainide  --Will arrange for outpatient treadmill stress  test following initiation of flecainide  --Okay to discharge home today, follow-up in the cardiology clinic in 1 week        Thank you for allowing me to participate in the care of your patient. Please to not hesitate to contact me with additional questions or concerns.     CORBY Crouch MD  Interventional Cardiology   01/18/21  11:21 EST

## 2021-01-18 NOTE — PLAN OF CARE
Goal Outcome Evaluation:  Plan of Care Reviewed With: patient, spouse  Progress: improving   Diana converted to NSR. Denies pain and SOA.  at bedside. Discharged home with  to assist as needed. Discharge teaching complete. Follow- up appointments provided.

## 2021-01-19 ENCOUNTER — TRANSITIONAL CARE MANAGEMENT TELEPHONE ENCOUNTER (OUTPATIENT)
Dept: CALL CENTER | Facility: HOSPITAL | Age: 56
End: 2021-01-19

## 2021-01-19 NOTE — DISCHARGE SUMMARY
[facility]   DISCHARGE SUMMARY      Name:  Diana Flores   Age:  55 y.o.  Sex:  female  :  1965  MRN:  [id number]   Visit Number:  [id number]    Admission Date:  2021  Date of Discharge:  2021  Primary Care Physician:  Deon Garcia MD    Discharge Diagnoses:         Atrial fibrillation with rapid ventricular response (CMS/HCC)      Presenting Problem:    Paroxysmal atrial fibrillation (CMS/HCC) [I48.0]  Atrial fibrillation with rapid ventricular response (CMS/HCC) [I48.91]  Paroxysmal atrial fibrillation (CMS/HCC) [I48.0]     Consults:     Consults     Date and Time Order Name Status Description    2021 0709 Inpatient Cardiology Consult Completed         Consulting Physician(s)     Provider Relationship Specialty    Jaylon Crouch MD Consulting Physician Cardiology          Procedures Performed:           History of presenting illness:    55 F history of atrial fibrillation on aspirin and metoprolol presented to the ED complaining of chest pressure and palpitations onset this afternoon around 4 PM.  Patient was cleaning at her usual pace with no additional or extra exertion per the norm when she felt chest pressure midsternal, and palpitations come on.  It was nonradiating, did not have any associated dyspnea, nausea, diaphoresis, or any other acute symptoms.  It has improved by arrival to the ER after being started on diltiazem drip.  She did miss her metoprolol this morning.  She denies any previous episode of experiencing chest pressure but has felt palpitations in the past.  In the ED, after initiation of the diltiazem drip she became hypotensive and was started on a fluid bolus.  The diltiazem rate has been slowed.  Per H&P    Hospital Course:    Patient was admitted to the hospital for observation.  She did not tolerate diltiazem drip due to hypotension.  After discontinuation of diltiazem drip patient was back in A. fib with RVR.  Amiodarone  was started as A. fib with RVR initiated less than 48 hours prior to presentation.  Sinus rhythm was restored.  Patient was scheduled to follow-up in the cardiology clinic in 1 week post discharge.  She was prescribed flecainide.  [id number] score was 1, however given potential need for electrocardioversion she was initiated on Eliquis.  Follow-up with PCP and cardiology.    Vital Signs:         Physical Exam:    General Appearance:  Alert and cooperative, not in any acute distress.   Head:  Atraumatic and normocephalic, without obvious abnormality.   Eyes:          PERRLA, conjunctivae and sclerae normal, no icterus. No pallor. Extraocular movements are within normal limits.   Ears:  Ears appear intact with no abnormalities noted.   Throat: No oral lesions, no thrush, oral mucosa moist.   Neck: Supple, trachea midline, no thyromegaly, no carotid bruit.       Lungs:   Chest shape is normal. Breath sounds heard bilaterally equally.  No crackles or wheezing. No pleural rub or bronchial breathing.   Heart:  Normal S1 and S2, no murmur, no gallop, no rub. No JVD.   Abdomen:   Normal bowel sounds, no masses, no organomegaly. Soft, nontender, nondistended, no guarding, no rebound tenderness.   Extremities: Moves all extremities well, no edema, no cyanosis, no clubbing.   Pulses: Pulses palpable and equal bilaterally.   Skin: No bleeding, bruising or rash.       Neurologic: Alert and oriented x 3. Moves all four limbs equally. No tremors. No facial asymmetry.     Pertinent Lab Results:     Results from last 7 days   Lab Units 01/16/21  1805   SODIUM mmol/L 139   POTASSIUM mmol/L 3.9   CHLORIDE mmol/L 105   CO2 mmol/L 20.3*   BUN mg/dL 22*   CREATININE mg/dL 0.77   CALCIUM mg/dL 9.9   BILIRUBIN mg/dL 0.2   ALK PHOS U/L 121*   ALT (SGPT) U/L 18   AST (SGOT) U/L 20   GLUCOSE mg/dL 101*     Results from last 7 days   Lab Units 01/16/21  1805   WBC 10*3/mm3 9.91   HEMOGLOBIN g/dL 14.3   HEMATOCRIT % 43.0   PLATELETS 10*3/mm3  223         Results from last 7 days   Lab Units 01/16/21  2204 01/16/21  1805   TROPONIN T ng/mL <0.010 <0.010                           Invalid input(s): USDES,  BLOODU, NITRITITE, BACT, EP  Pain Management Panel     There is no flowsheet data to display.              Pertinent Radiology Results:    Imaging Results (All)     Procedure Component Value Units Date/Time    XR Chest 1 View [[id number]] Collected: 01/17/21 0519     Updated: 01/17/21 0521    Narrative:      PROCEDURE: XR CHEST 1 VW-     HISTORY: rapid hr        COMPARISON: 07/10/2019.     FINDINGS:  The heart size is normal. The mediastinum is normal. No acute  pulmonary abnormality is identified. There is no pneumothorax. The bony  thorax in intact.       Impression:      No acute cardiopulmonary process.           This report was finalized on 1/17/2021 5:19 AM by Harley York MD.          Echo:    Results for orders placed during the hospital encounter of 07/10/19   Transthoracic Echo Complete With Contrast if Necessary Per Protocol    Narrative Technically limited study   1) Normal LV systolic function ( EF is over 55%)  2) Normal left atrial size with normal LVedp   3) Trace MR and TR with normal PA pressures   4) Normal size and function of the RV        Condition on Discharge:      Stable.    Code status during the hospital stay:    Code Status and Medical Interventions:   Ordered at: 01/16/21 1908     Code Status:    CPR     Medical Interventions (Level of Support Prior to Arrest):    Full       Discharge Disposition:    Home or Self Care    Discharge Medications:       Discharge Medications      New Medications      Instructions Start Date   Eliquis 5 MG tablet tablet  Generic drug: apixaban   Take 1 tablet by mouth Every 12 (Twelve) Hours      flecainide 50 MG tablet  Commonly known as: TAMBOCOR   Take 1 tablet by mouth Every 12 (Twelve) Hours         Continue These Medications      Instructions Start Date   Aspirin Low Dose 81 MG EC  tablet  Generic drug: aspirin   81 mg, Oral, Daily      atorvastatin 40 MG tablet  Commonly known as: LIPITOR   TAKE 1 TABLET BY MOUTH EVERY DAY AT NIGHT      CALCIUM 1200 PO   1,200 mg, Oral      metoprolol succinate XL 25 MG 24 hr tablet  Commonly known as: TOPROL-XL   25 mg, Oral, Daily, 200001             Discharge Diet:     Diet Instructions     Regular Cardiac               Activity at Discharge:     Activity Instructions     As tolerated               Follow-up Appointments:    Additional Instructions for the Follow-ups that You Need to Schedule     Discharge Follow-up with PCP   As directed       Currently Documented PCP:    Deon Garcia MD    PCP Phone Number:    [phone]     Follow Up Details: 2 weeks           Follow-up Information     Jaylon Crouch MD Follow up in 1 week(s).    Specialties: Cardiology, Interventional Cardiology, Internal Medicine  Why: Follow up appointment January 28th @ 09:45 am  Contact information:  [address]  [address]  [facility] [address] [phone]             Deon Garcia MD Follow up.    Specialty: Internal Medicine  Why: 2 weeks  Follow up appt Monday Feb 1st @ 11:00   Contact information:  [address]  [address]  [facility] [address]  [phone]                   Future Appointments   Date Time Provider Department Center   1/28/2021  9:45 AM Jaylon Crouch MD [facility] R None   2/1/2021 11:00 AM Marie Paredes PA [facility]       Additional Instructions for the Follow-ups that You Need to Schedule     Discharge Follow-up with PCP   As directed       Currently Documented PCP:    Deon Garcia MD    PCP Phone Number:    [phone]     Follow Up Details: 2 weeks               Test Results Pending at Discharge:           Raul Sheffield DO  01/19/21  18:21 EST    Time spent: Time: I spent  >30  minutes on this discharge activity which included: face-to-face encounter with the patient, reviewing the data in the system,  coordination of the care with the nursing staff as well as consultants, documentation, and entering orders.        Dictated utilizing Dragon dictation.

## 2021-01-19 NOTE — OUTREACH NOTE
Call Center TCM Note      Responses   [facility] patient discharged from?  [facility]   Does the patient have one of the following disease processes/diagnoses(primary or secondary)?  Other   TCM attempt successful?  Yes   Call start time  1259   Call end time  1301   Discharge diagnosis  Atrial fib with RVR   Is patient permission given to speak with other caregiver?  No   Meds reviewed with patient/caregiver?  Yes   Is the patient having any side effects they believe may be caused by any medication additions or changes?  No   Does the patient have all medications ordered at discharge?  Yes   Is the patient taking all medications as directed (includes completed medication regime)?  Yes   Does the patient have a primary care provider?   Yes   Does the patient have an appointment with their PCP within 7 days of discharge?  Greater than 7 days   Comments regarding PCP  PCP Dr Deon Garcia. Hospital Follow Up with VEENA Toney Monday Feb 1, 2021 11:00 AM   Nursing Interventions  Verified appointment date/time/provider   Has the patient kept scheduled appointments due by today?  N/A   Comments  Follow up with Jaylon Crouch MD 1/28/21 945am   Has home health visited the patient within 72 hours of discharge?  N/A   Psychosocial issues?  No   Did the patient receive a copy of their discharge instructions?  Yes   Nursing interventions  Reviewed instructions with patient   What is the patient's perception of their health status since discharge?  Improving   Is the patient/caregiver able to teach back signs and symptoms related to disease process for when to call PCP?  Yes   Is the patient/caregiver able to teach back signs and symptoms related to disease process for when to call 911?  Yes   Is the patient/caregiver able to teach back the hierarchy of who to call/visit for symptoms/problems? PCP, Specialist, Home health nurse, Urgent Care, ED, 911  Yes   If the patient is a current smoker, are they able to  teach back resources for cessation?  Not a smoker   TCM call completed?  Yes          Andree Deal RN    1/19/2021, 13:01 EST

## 2021-01-19 NOTE — PAYOR COMM NOTE
"TO:GEHA  FROM:MISBAH MARCOS RN PHONE [phone]  DISCHARGE SUMM    Diana Morales (55 y.o. Female)     Date of Birth Social Security Number Address Home Phone MRN    1965  [address] [place] [phone] [id number]    Druze Marital Status          Shinto        Admission Date Admission Type Admitting Provider Attending Provider Department, Room/Bed    1/16/21 Emergency Raul Sheffield DO  [facility] MED SURG  3, 305/1    Discharge Date Discharge Disposition Discharge Destination        1/18/2021 Home or Self Care              Attending Provider: (none)   Allergies: No Known Allergies    Isolation: None   Infection: None   Code Status: Prior    Ht: 162.6 cm (64\")   Wt: 71.8 kg (158 lb 4.6 oz)    Admission Cmt: None   Principal Problem: Atrial fibrillation with rapid ventricular response (CMS/HCC) [I48.91]                 Active Insurance as of 1/16/2021     Primary Coverage     Payor Plan Insurance Group Employer/Plan Group    [employer] GEHA - [id number]     Payor Plan Address Payor Plan Phone Number Payor Plan Fax Number Effective Dates    [address]   12/13/2016 - None Entered    [id number]       Subscriber Name Subscriber Birth Date Member ID       JACKIE MORALES 8/16/1969 [id number]                 Emergency Contacts      (Rel.) Home Phone Work Phone Mobile Phone    Jackie Morales (Spouse) [phone] -- [phone]               Physician Progress Notes (last 48 hours) (Notes from 01/17/21 1446 through 01/19/21 1446)      Jaylon Crouch MD at 01/18/21 1120               [facility] Cardiology IP Progress Note    Diana Morales  1965  [id number]  01/18/21    Subjective:   Mrs. Diana Morales is a 55 y.o. female seen in follow-up for paroxysmal atrial fibrillation with rapid ventricular rates.  No acute overnight events.  On review telemetry, converted " to sinus rhythm at approximately 9:30 AM this morning.  Currently, the patient denies palpitations.  No recurrent episodes of chest pain or chest discomfort.  No new complaints at this time.    Review of Systems:   Review of Systems   Constitutional: Negative for chills, diaphoresis, fatigue and fever.   Respiratory: Negative for cough, chest tightness, shortness of breath and wheezing.    Cardiovascular: Negative for chest pain, palpitations and leg swelling.   Gastrointestinal: Negative for abdominal pain and GERD.   Neurological: Negative for dizziness, syncope, weakness and light-headedness.       I have reviewed and/or updated the patient's past medical, past surgical, family history, social history, problem list and allergies as appropriate in the chart.     Physical Exam:  Vital Signs:   Vitals:    01/18/21 0201 01/18/21 0344 01/18/21 0500 01/18/21 0700   BP: 91/52 115/79 101/76 103/88   BP Location:  Right arm Right arm Right arm   Patient Position:  Lying Lying Sitting   Pulse: 85 96 106 104   Resp:  18  18   Temp:  98.1 °F (36.7 °C)  98.1 °F (36.7 °C)   TempSrc:  Oral  Oral   SpO2: 98% 98% 97% 98%   Weight:       Height:           Physical Exam  Vitals signs and nursing note reviewed.   Constitutional:       General: She is not in acute distress.     Appearance: Normal appearance. She is well-developed. She is not diaphoretic.   HENT:      Head: Normocephalic and atraumatic.   Eyes:      General: No scleral icterus.     Pupils: Pupils are equal, round, and reactive to light.   Neck:      Musculoskeletal: Neck supple. No muscular tenderness.      Trachea: No tracheal deviation.   Cardiovascular:      Rate and Rhythm: Normal rate and regular rhythm.      Heart sounds: Normal heart sounds. No murmur. No friction rub. No gallop.       Comments: Normal JVD.  Pulmonary:      Effort: Pulmonary effort is normal. No respiratory distress.      Breath sounds: Normal breath sounds. No stridor. No wheezing or rales.    Chest:      Chest wall: No tenderness.   Abdominal:      General: Bowel sounds are normal. There is no distension.      Palpations: Abdomen is soft.      Tenderness: There is no abdominal tenderness. There is no guarding or rebound.   Musculoskeletal: Normal range of motion.         General: No swelling.   Lymphadenopathy:      Cervical: No cervical adenopathy.   Skin:     General: Skin is warm and dry.      Findings: No erythema.   Neurological:      General: No focal deficit present.      Mental Status: She is alert and oriented to person, place, and time.   Psychiatric:         Mood and Affect: Mood normal.         Behavior: Behavior normal.         Results Review:   Results from last 7 days   Lab Units 21  1805   SODIUM mmol/L 139   POTASSIUM mmol/L 3.9   CHLORIDE mmol/L 105   CO2 mmol/L 20.3*   BUN mg/dL 22*   CREATININE mg/dL 0.77   CALCIUM mg/dL 9.9   BILIRUBIN mg/dL 0.2   ALK PHOS U/L 121*   ALT (SGPT) U/L 18   AST (SGOT) U/L 20   GLUCOSE mg/dL 101*     Results from last 7 days   Lab Units 21  2204 21  1805   TROPONIN T ng/mL <0.010 <0.010     Results from last 7 days   Lab Units 21  1805   WBC 10*3/mm3 9.91   HEMOGLOBIN g/dL 14.3   HEMATOCRIT % 43.0   PLATELETS 10*3/mm3 223         Results from last 7 days   Lab Units 21  1805   MAGNESIUM mg/dL 2.0           I personally viewed and interpreted the patient's EKG/Telemetry data     Medications:   )  Current Facility-Administered Medications:   •  acetaminophen (TYLENOL) tablet 650 mg, 650 mg, Oral, Q4H PRN **OR** acetaminophen (TYLENOL) 160 MG/5ML solution 650 mg, 650 mg, Oral, Q4H PRN **OR** acetaminophen (TYLENOL) suppository 650 mg, 650 mg, Rectal, Q4H PRN, Raquel Craven, DO  •  [COMPLETED] amiodarone in dextrose 5% (NEXTERONE) loading dose 150mg/100mL, 150 mg, Intravenous, Once, 150 mg at 21 1052 **FOLLOWED BY** [] amiodarone 360 mg in 200 mL D5W infusion, 1 mg/min, Intravenous, Continuous, Stopped at 21  1723 **FOLLOWED BY** amiodarone 360 mg in 200 mL D5W infusion, 0.5 mg/min, Intravenous, Continuous, Jaylon Crouch MD, Last Rate: 16.67 mL/hr at 01/18/21 0530, 0.5 mg/min at 01/18/21 0530  •  aspirin EC tablet 81 mg, 81 mg, Oral, Daily, Herber, Umar, DO, 81 mg at 01/18/21 0829  •  atorvastatin (LIPITOR) tablet 40 mg, 40 mg, Oral, Nightly, Herber, Umar, DO, 40 mg at 01/17/21 2022  •  enoxaparin (LOVENOX) syringe 40 mg, 40 mg, Subcutaneous, Nightly, Herber, Umar, DO, 40 mg at 01/17/21 2022  •  metoprolol tartrate (LOPRESSOR) tablet 50 mg, 50 mg, Oral, Q12H, Herber, Umar, DO, 50 mg at 01/18/21 0829  •  ondansetron (ZOFRAN) injection 4 mg, 4 mg, Intravenous, Q6H PRN, Herber, Umar, DO  •  sodium chloride 0.9 % flush 10 mL, 10 mL, Intravenous, Q12H, Herber, Umar, DO, 10 mL at 01/18/21 0829  •  sodium chloride 0.9 % flush 10 mL, 10 mL, Intravenous, PRN, Herber, Umar, DO    Assessment / Plan:     1.  Paroxysmal atrial fibrillation with rapid ventricular rates  --Initial episode of PAF with RVR occurred in 7/19, no recurrence until current presentation  --Converted to sinus rhythm at approximately 9:30 AM this morning  --Discontinue amiodarone drip  --Given bradycardia, change metoprolol to 25 mg p.o. daily on discharge  --Start flecainide 50 mg p.o. twice daily on Wednesday 1/20  --[id number] is 1, however will start 3-4-week course of anticoagulation with Eliquis and reconsider upon outpatient follow-up  --The patient has been instructed to present to the cardiology clinic on Friday 1/22 for repeat ECG after initiation of flecainide  --Will arrange for outpatient treadmill stress test following initiation of flecainide  --Okay to discharge home today, follow-up in the cardiology clinic in 1 week        Thank you for allowing me to participate in the care of your patient. Please to not hesitate to contact me with additional questions or concerns.     CORBY Crouch MD  Interventional Cardiology   01/18/21  11:21  EST      Electronically signed by Jaylon Crouch MD at 01/18/21 113       Discharge Summary    No notes of this type exist for this encounter.

## 2021-01-21 LAB
QT INTERVAL: 340 MS
QTC INTERVAL: 429 MS

## 2021-01-27 NOTE — PLAN OF CARE
Problem: Perioperative Period (Adult)  Goal: Signs and Symptoms of Listed Potential Problems Will be Absent or Manageable (Perioperative Period)  Outcome: Ongoing (interventions implemented as appropriate)    07/21/17 1424   Perioperative Period   Problems Assessed (Perioperative Period) all   Problems Present (Perioperative Period) none   Pt meets PACU discharge criteria.       no

## 2021-01-28 ENCOUNTER — OFFICE VISIT (OUTPATIENT)
Dept: CARDIOLOGY | Facility: CLINIC | Age: 56
End: 2021-01-28

## 2021-01-28 VITALS
HEIGHT: 64 IN | BODY MASS INDEX: 27.04 KG/M2 | OXYGEN SATURATION: 97 % | WEIGHT: 158.4 LBS | HEART RATE: 73 BPM | DIASTOLIC BLOOD PRESSURE: 62 MMHG | RESPIRATION RATE: 18 BRPM | SYSTOLIC BLOOD PRESSURE: 102 MMHG

## 2021-01-28 DIAGNOSIS — I48.0 PAROXYSMAL ATRIAL FIBRILLATION WITH RVR (HCC): Primary | ICD-10-CM

## 2021-01-28 DIAGNOSIS — E78.00 PURE HYPERCHOLESTEROLEMIA: ICD-10-CM

## 2021-01-28 PROCEDURE — 93000 ELECTROCARDIOGRAM COMPLETE: CPT | Performed by: INTERNAL MEDICINE

## 2021-01-28 PROCEDURE — 99214 OFFICE O/P EST MOD 30 MIN: CPT | Performed by: INTERNAL MEDICINE

## 2021-01-28 NOTE — PROGRESS NOTES
[facility] Cardiology Office Follow Up Note    Diana Flores  [id number]  2021    Primary Care Provider: Deon Garcia MD    Chief Complaint: Hospital follow-up    History of Present Illness:   Mrs. Diana Flores is a 55 y.o. female who presents to the Cardiology Clinic for hospital follow-up.  The patient has a past medical history significant for hyperlipidemia.  She has a past cardiac history significant for paroxysmal atrial fibrillation, initially presenting in  when she spontaneously converted to sinus rhythm at that time.  Most recently, the patient was hospitalized in  for atrial fibrillation with rapid ventricular rates.  She again spontaneously converted to sinus rhythm, and was subsequently discharged on flecainide for rhythm control.  She presents the cardiology clinic today for follow-up after hospital discharge.  Since discharge, the patient reports she has done well without any recurrent episodes of palpitations to suggest recurrent PAF or RVR.  No significant chest pain, exertional dyspnea, orthopnea, PND, or lower extremity swelling.  She has tolerated Eliquis without significant bleeding or bruising.  No other specific complaints at this time.    Past Cardiac Testin. Last Coronary Angio: None  2. Prior Stress Testing: None  3. Last Echo: 2019   1.  Normal left ventricular systolic function   2.  Trace MR and TR  4. Prior Holter Monitor: None    Review of Systems:   Review of Systems   Constitutional: Negative for activity change, appetite change, chills, diaphoresis, fatigue, fever, unexpected weight gain and unexpected weight loss.   Eyes: Negative for blurred vision and double vision.   Respiratory: Negative for cough, chest tightness, shortness of breath and wheezing.    Cardiovascular: Negative for chest pain, palpitations and leg swelling.   Gastrointestinal: Negative for abdominal pain, anal bleeding, blood in stool and GERD.    "  Endocrine: Negative for cold intolerance and heat intolerance.   Genitourinary: Negative for hematuria.   Neurological: Negative for dizziness, syncope, weakness and light-headedness.   Hematological: Does not bruise/bleed easily.   Psychiatric/Behavioral: Negative for depressed mood and stress. The patient is not nervous/anxious.        I have reviewed and/or updated the patient's past medical, past surgical, family, social history, problem list and allergies as appropriate.     Medications:     Current Outpatient Medications:   •  aspirin 81 MG EC tablet, Take 1 tablet by mouth Daily., Disp: 30 tablet, Rfl: 0  •  atorvastatin (LIPITOR) 40 MG tablet, TAKE 1 TABLET BY MOUTH EVERY DAY AT NIGHT, Disp: 90 tablet, Rfl: 3  •  Calcium Carbonate-Vit D-Min (CALCIUM 1200 PO), Take 1,200 mg by mouth., Disp: , Rfl:   •  flecainide (TAMBOCOR) 50 MG tablet, Take 1 tablet by mouth Every 12 (Twelve) Hours, Disp: 60 tablet, Rfl: 0  •  metoprolol succinate XL (TOPROL-XL) 25 MG 24 hr tablet, Take 1 tablet by mouth Daily. 200001, Disp: 90 tablet, Rfl: 3    Physical Exam:  Vital Signs:   Vitals:    01/28/21 0953   BP: 102/62   BP Location: Right arm   Patient Position: Sitting   Cuff Size: Adult   Pulse: 73   Resp: 18   SpO2: 97%   Weight: 71.8 kg (158 lb 6.4 oz)   Height: 162.6 cm (64\")       Physical Exam  Constitutional:       General: She is not in acute distress.     Appearance: Normal appearance. She is well-developed. She is not diaphoretic.   HENT:      Head: Normocephalic and atraumatic.   Eyes:      General: No scleral icterus.     Pupils: Pupils are equal, round, and reactive to light.   Neck:      Musculoskeletal: Neck supple. No muscular tenderness.      Trachea: No tracheal deviation.   Cardiovascular:      Rate and Rhythm: Normal rate and regular rhythm.      Heart sounds: Normal heart sounds. No murmur. No friction rub. No gallop.       Comments: Normal JVD.  Pulmonary:      Effort: Pulmonary effort is normal. No " respiratory distress.      Breath sounds: Normal breath sounds. No stridor. No wheezing or rales.   Chest:      Chest wall: No tenderness.   Abdominal:      General: Bowel sounds are normal. There is no distension.      Palpations: Abdomen is soft.      Tenderness: There is no abdominal tenderness. There is no guarding or rebound.   Musculoskeletal: Normal range of motion.         General: No swelling.   Lymphadenopathy:      Cervical: No cervical adenopathy.   Skin:     General: Skin is warm and dry.      Findings: No erythema.   Neurological:      General: No focal deficit present.      Mental Status: She is alert and oriented to person, place, and time.   Psychiatric:         Mood and Affect: Mood normal.         Behavior: Behavior normal.         Results Review:   I reviewed the patient's new clinical results.  I personally viewed and interpreted the patient's EKG/Telemetry data      ECG 12 Lead    Date/Time: 1/28/2021 12:45 PM  Performed by: Jaylon Crocuh MD  Authorized by: Jaylon Crouch MD   Comparison: not compared with previous ECG   Rhythm: sinus rhythm  Rate: normal  Conduction: left anterior fascicular block  QRS axis: left  Other findings: non-specific ST-T wave changes    Clinical impression: abnormal EKG            Assessment / Plan:     1.  Paroxysmal atrial fibrillation with rapid ventricular rates  --No recurrent palpitations to suggest recurrent PAF or RVR  --ECG today shows NSR  --Will continue low-dose flecainide for rhythm control, QTC today within normal limits  --Continue metoprolol XL  --Will proceed with outpatient GXT as the patient is now on flecainide  --[id number] score is 1, and discussed the risk and benefits of continuing therapeutic anticoagulation and the patient has decided to discontinue Eliquis and continue with daily aspirin for now given low CVA risk.  Will need to reconsider therapeutic anticoagulation if additional risk factors  --Follow-up in 3 months, or sooner  if required    2.  Hyperlipidemia  --Continue statin      Follow Up:   Return in about 3 months (around 4/28/2021).      Thank you for allowing me to participate in the care of your patient. Please to not hesitate to contact me with additional questions or concerns.     CORBY Crouch MD  Interventional Cardiology   01/28/2021  12:41 EST

## 2021-02-08 DIAGNOSIS — E78.00 PURE HYPERCHOLESTEROLEMIA: ICD-10-CM

## 2021-02-08 RX ORDER — ATORVASTATIN CALCIUM 40 MG/1
40 TABLET, FILM COATED ORAL
Qty: 90 TABLET | Refills: 3 | Status: SHIPPED | OUTPATIENT
Start: 2021-02-08 | End: 2022-02-21

## 2021-02-12 NOTE — PROGRESS NOTES
You have chosen to receive care through a telehealth visit.  Do you consent to use a video/audio connection for your medical care today? Yes  Active parties: Marie Paredes PA-C, Catherine Herron CMA, Diana Flores    Chief Complaint  Follow-up ([facility] for Afib)    Subjective          Diana Flores presents to [facility] PRIMARY [facility]ARE  History of Present Illness  Patient presents today via video visit for follow-up from and ED visit.  On 1/16/2021 she presented to [facility] ED with complaint of substernal chest pressure and palpitations which began the same day while cleaning.  She had missed her metoprolol dose that morning.  She was found to be in atrial fibrillation with RVR.  Symptoms improved after being started on diltiazem drip, however the diltiazem drip was discontinued due to causing hypotension. She was admitted for observation.  After discontinuation of diltiazem drip patient was back in atrial fibrillation with RVR.  Amiodarone was started and sinus rhythm was restored spontaneously. She was prescribed flecainide.  [id number] score was 1, however given potential need for electrocardioversion she was initiated on Eliquis.    She was initially diagnosed with paroxysmal atrial fibrillation in 2019 at which time she spontaneously converted to sinus rhythm she followed up with cardiology on 1/28 at which time she reported no subsequent similar episodes of palpitations.  Aspirin, flecainide and metoprolol were continued with Eliquis being discontinued at that time.    Today she reports that she has been taking flecainide, metoprolol and aspirin as directed and she denies any problems with her current medications.  She reports that she has had less palpitations and SOA with the new medication regimen and has only had very mild symptoms on 2 occasions.  She has a stress test scheduled for 2/23/2021 and will follow up with cardiology in around 2 months.    8 days ago when wiping after  having a bowel movement she noticed a large amount of red blood in the toilet bowl.  She denies any associated pain in the rectum, abdominal pain, vomiting, diarrhea, pallor or abnormal fatigue.  She feels she may have been a little constipated but nothing major.  No previous or subsequent occurrences.  She does have a history of hemorrhoids but does not feel that the recent blood was related.       Objective   Vital Signs:   There were no vitals taken for this visit.    Physical Exam  Vitals signs and nursing note reviewed.   Constitutional:       General: She is not in acute distress.     Appearance: Normal appearance. She is well-developed. She is not ill-appearing, toxic-appearing or diaphoretic.   HENT:      Head: Normocephalic and atraumatic.      Right Ear: External ear normal.      Left Ear: External ear normal.   Eyes:      General: No scleral icterus.     Extraocular Movements: Extraocular movements intact.      Pupils: Pupils are equal, round, and reactive to light.   Neck:      Musculoskeletal: Normal range of motion.      Comments: Neck appears normal  Pulmonary:      Effort: Pulmonary effort is normal. No respiratory distress.   Abdominal:      Tenderness: There is no abdominal tenderness ( No abdominal tenderness on exam performed by patient under providers direction).   Skin:     Coloration: Skin is not pale ( No pallor).      Findings: No erythema or rash.   Neurological:      General: No focal deficit present.      Mental Status: She is alert and oriented to person, place, and time.      Coordination: Coordination normal.   Psychiatric:         Mood and Affect: Mood normal.         Behavior: Behavior normal.         Thought Content: Thought content normal.         Judgment: Judgment normal.        Result Review :   The following data was reviewed by: VEENA Toney on 02/15/2021:  CMP    CMP 2/24/20 1/16/21   Glucose 98 101 (A)   BUN 23 (A) 22 (A)   Creatinine 0.70 0.77   eGFR Non African  Am 87 78   Sodium 141 139   Potassium 4.2 3.9   Chloride 104 105   Calcium 10.0 9.9   Albumin  4.60   Total Bilirubin  0.2   Alkaline Phosphatase  121 (A)   AST (SGOT)  20   ALT (SGPT)  18   (A) Abnormal value            CBC    CBC 2/24/20 1/16/21   WBC 6.18 9.91   RBC 4.65 4.90   Hemoglobin 13.6 14.3   Hematocrit 42.1 43.0   MCV 90.5 87.8   MCH 29.2 29.2   MCHC 32.3 33.3   RDW 11.9 (A) 12.2 (A)   Platelets 196 223   (A) Abnormal value            Data reviewed: Cardiology studies EKG, cardiology consultation note, and Recent hospitalization notes R ED & admission.          Assessment and Plan    Diagnoses and all orders for this visit:    1. Paroxysmal atrial fibrillation with RVR (CMS/HCC) (Primary)  -     Stable, continue: Flecainide (TAMBOCOR) 50 MG tablet; Take 1 tablet by mouth Every 12 (Twelve) Hours.  Dispense: 60 tablet; Refill: 2  Continue metoprolol 25 mg daily and aspirin 81 mg daily    Current outpatient and discharge medications have been reconciled for the patient.  Reviewed by: VEENA Toney    2. Hematochezia  Isolated event which occurred proximately 1 week after discontinuing Eliquis.  No previous or subsequent events.  Cause of bleeding may have been due to a fistula.  There are any subsequent episodes she will notify me immediately or proceed to the emergency department with any acute concern.    I spent 44 minutes caring for Diana on this date of service. This time includes time spent by me in the following activities:preparing for the visit, reviewing tests, obtaining and/or reviewing a separately obtained history, performing a medically appropriate examination and/or evaluation , counseling and educating the patient/family/caregiver, ordering medications, tests, or procedures and documenting information in the medical record  Follow Up   Return in about 3 months (around 5/15/2021) for Next scheduled follow up.  Patient was given instructions and counseling regarding her condition  or for health maintenance advice. Please see specific information pulled into the AVS if appropriate.

## 2021-02-15 ENCOUNTER — TELEMEDICINE (OUTPATIENT)
Dept: INTERNAL MEDICINE | Facility: CLINIC | Age: 56
End: 2021-02-15

## 2021-02-15 DIAGNOSIS — K92.1 HEMATOCHEZIA: ICD-10-CM

## 2021-02-15 DIAGNOSIS — I48.0 PAROXYSMAL ATRIAL FIBRILLATION WITH RVR (HCC): Primary | ICD-10-CM

## 2021-02-15 PROCEDURE — 99215 OFFICE O/P EST HI 40 MIN: CPT | Performed by: PHYSICIAN ASSISTANT

## 2021-02-15 RX ORDER — FLECAINIDE ACETATE 50 MG/1
50 TABLET ORAL EVERY 12 HOURS
Qty: 60 TABLET | Refills: 2 | Status: SHIPPED | OUTPATIENT
Start: 2021-02-15 | End: 2021-05-17 | Stop reason: SDUPTHER

## 2021-05-17 DIAGNOSIS — I48.0 PAROXYSMAL ATRIAL FIBRILLATION WITH RVR (HCC): ICD-10-CM

## 2021-05-18 RX ORDER — FLECAINIDE ACETATE 50 MG/1
50 TABLET ORAL EVERY 12 HOURS
Qty: 60 TABLET | Refills: 2 | Status: SHIPPED | OUTPATIENT
Start: 2021-05-18 | End: 2021-06-01 | Stop reason: SDUPTHER

## 2021-06-01 ENCOUNTER — OFFICE VISIT (OUTPATIENT)
Dept: CARDIOLOGY | Facility: CLINIC | Age: 56
End: 2021-06-01

## 2021-06-01 VITALS
HEART RATE: 78 BPM | SYSTOLIC BLOOD PRESSURE: 142 MMHG | HEIGHT: 64 IN | BODY MASS INDEX: 26.8 KG/M2 | OXYGEN SATURATION: 98 % | DIASTOLIC BLOOD PRESSURE: 78 MMHG | WEIGHT: 157 LBS

## 2021-06-01 DIAGNOSIS — I48.0 PAROXYSMAL ATRIAL FIBRILLATION WITH RVR (HCC): ICD-10-CM

## 2021-06-01 PROCEDURE — 99214 OFFICE O/P EST MOD 30 MIN: CPT | Performed by: INTERNAL MEDICINE

## 2021-06-01 PROCEDURE — 93000 ELECTROCARDIOGRAM COMPLETE: CPT | Performed by: INTERNAL MEDICINE

## 2021-06-01 RX ORDER — FLECAINIDE ACETATE 50 MG/1
50 TABLET ORAL EVERY 12 HOURS
Qty: 180 TABLET | Refills: 3 | Status: SHIPPED | OUTPATIENT
Start: 2021-06-01 | End: 2022-08-05 | Stop reason: SDUPTHER

## 2021-06-01 RX ORDER — METOPROLOL SUCCINATE 25 MG/1
25 TABLET, EXTENDED RELEASE ORAL DAILY
Qty: 90 TABLET | Refills: 3 | Status: SHIPPED | OUTPATIENT
Start: 2021-06-01 | End: 2022-08-26 | Stop reason: SDUPTHER

## 2021-06-01 NOTE — PROGRESS NOTES
[facility] Cardiology Office Follow Up Note    Diana Flores  [id number]  2021    Primary Care Provider: Deon Garcia MD    Chief Complaint: Regular follow-up    History of Present Illness:   Mrs. Diana Flores is a 55 y.o. female who presents to the Cardiology Clinic for regular follow-up. The patient has a past medical history significant for hyperlipidemia.  She has a past cardiac history significant for paroxysmal atrial fibrillation, initially presenting in  when she spontaneously converted to sinus rhythm. Most recently, the patient was hospitalized in  for atrial fibrillation with rapid ventricular rates.  She again spontaneously converted to sinus rhythm, and was subsequently discharged on flecainide for rhythm control.  She presents the cardiology clinic today for regular follow-up.  The patient reports she has been well since her last follow-up appointment without any significant change in her health.  She reports no significant episodes of palpitations while on flecainide.  She has previously declined therapeutic anticoagulation given her [id number] score of 1, however she continues to take aspirin daily.  No specific complaints at this time.     Past Cardiac Testin. Last Coronary Angio: None  2. Prior Stress Testing:  GXT 3/22/2021   1.  Normal exercise treadmill stress test.   2.  Normal chronotropic and BP response to exercise.   3.  Average exercise capacity, reaching 10.1 METS.   4.  Low risk Duke treadmill score.  3. Last Echo: 2019              1.  Normal left ventricular systolic function              2.  Trace MR and TR  4. Prior Holter Monitor: None    Review of Systems:   Review of Systems   Constitutional: Negative for activity change, appetite change, chills, diaphoresis, fatigue, fever, unexpected weight gain and unexpected weight loss.   Eyes: Negative for blurred vision and double vision.   Respiratory: Negative for cough,  "chest tightness, shortness of breath and wheezing.    Cardiovascular: Negative for chest pain, palpitations and leg swelling.   Gastrointestinal: Negative for abdominal pain, anal bleeding, blood in stool and GERD.   Endocrine: Negative for cold intolerance and heat intolerance.   Genitourinary: Negative for hematuria.   Neurological: Negative for dizziness, syncope, weakness and light-headedness.   Hematological: Does not bruise/bleed easily.   Psychiatric/Behavioral: Negative for depressed mood and stress. The patient is not nervous/anxious.        I have reviewed and/or updated the patient's past medical, past surgical, family, social history, problem list and allergies as appropriate.     Medications:     Current Outpatient Medications:   •  aspirin 81 MG EC tablet, Take 1 tablet by mouth Daily., Disp: 30 tablet, Rfl: 0  •  atorvastatin (LIPITOR) 40 MG tablet, Take 1 tablet by mouth every night at bedtime., Disp: 90 tablet, Rfl: 3  •  Biotin w/ Vitamins C & E (HAIR SKIN & NAILS GUMMIES PO), , Disp: , Rfl:   •  Calcium Carbonate-Vit D-Min (CALCIUM 1200 PO), Take 1,200 mg by mouth., Disp: , Rfl:   •  flecainide (TAMBOCOR) 50 MG tablet, Take 1 tablet by mouth Every 12 (Twelve) Hours., Disp: 180 tablet, Rfl: 3  •  metoprolol succinate XL (TOPROL-XL) 25 MG 24 hr tablet, Take 1 tablet by mouth Daily. 200001, Disp: 90 tablet, Rfl: 3    Physical Exam:  Vital Signs:   Vitals:    06/01/21 1148   BP: 142/78   BP Location: Left arm   Patient Position: Sitting   Cuff Size: Adult   Pulse: 78   SpO2: 98%   Weight: 71.2 kg (157 lb)   Height: 162.6 cm (64\")       Physical Exam  Constitutional:       General: She is not in acute distress.     Appearance: Normal appearance. She is well-developed. She is not diaphoretic.   HENT:      Head: Normocephalic and atraumatic.   Eyes:      General: No scleral icterus.     Pupils: Pupils are equal, round, and reactive to light.   Neck:      Trachea: No tracheal deviation.   Cardiovascular: "      Rate and Rhythm: Normal rate and regular rhythm.      Heart sounds: Normal heart sounds. No murmur heard.   No friction rub. No gallop.       Comments: Normal JVD.  Pulmonary:      Effort: Pulmonary effort is normal. No respiratory distress.      Breath sounds: Normal breath sounds. No stridor. No wheezing or rales.   Chest:      Chest wall: No tenderness.   Abdominal:      General: Bowel sounds are normal. There is no distension.      Palpations: Abdomen is soft.      Tenderness: There is no abdominal tenderness. There is no guarding or rebound.   Musculoskeletal:         General: No swelling. Normal range of motion.      Cervical back: Neck supple. No tenderness.   Lymphadenopathy:      Cervical: No cervical adenopathy.   Skin:     General: Skin is warm and dry.      Findings: No erythema.   Neurological:      General: No focal deficit present.      Mental Status: She is alert and oriented to person, place, and time.   Psychiatric:         Mood and Affect: Mood normal.         Behavior: Behavior normal.         Results Review:   I reviewed the patient's new clinical results.  I personally viewed and interpreted the patient's EKG/Telemetry data      ECG 12 Lead    Date/Time: 6/1/2021 12:44 PM  Performed by: Jaylon Crouch MD  Authorized by: Jaylon Crouch MD   Comparison: not compared with previous ECG   Rhythm: sinus rhythm  Rate: normal  QRS axis: left  Other findings comments:  ms.    Clinical impression: abnormal EKG            Assessment / Plan:     1.  Paroxysmal atrial fibrillation with rapid ventricular rates  --Continues to do well on flecainide without symptoms to suggest recurrent PAF/RVR  --ECG today continues to show NSR, QTc 454 ms  --Continue low-dose flecainide for rhythm control  --Continue metoprolol XL  --[id number] score is 1, the patient is previously declined therapeutic anticoagulation and will continue aspirin and will need to rediscuss therapeutic anticoagulation when  the patient reaches 65 years of age  --Follow-up in 6 months, or sooner if required     2.  Hyperlipidemia  --Continue statin  --Lipid profile be obtained with next labs per PCP       Follow Up:   Return in about 6 months (around 12/1/2021).      Thank you for allowing me to participate in the care of your patient. Please to not hesitate to contact me with additional questions or concerns.     CORBY Crouch MD  Interventional Cardiology   06/01/2021  11:44 EDT

## 2021-06-14 ENCOUNTER — TELEPHONE (OUTPATIENT)
Dept: CARDIOLOGY | Facility: CLINIC | Age: 56
End: 2021-06-14

## 2021-06-14 NOTE — TELEPHONE ENCOUNTER
----- Message from Diana Flores sent at 6/11/2021  7:46 PM EDT -----  Regarding: Non-Urgent Medical Question  Contact: [phone]  I am scheduled to receive the Moderna vaccine on 6-17-21.  Should I be concerned with my Afib condition or the medications I take? Please advise.    Thank You  Diana Flores

## 2021-08-04 ENCOUNTER — OFFICE VISIT (OUTPATIENT)
Dept: INTERNAL MEDICINE | Facility: CLINIC | Age: 56
End: 2021-08-04

## 2021-08-04 VITALS
HEART RATE: 57 BPM | SYSTOLIC BLOOD PRESSURE: 123 MMHG | HEIGHT: 64 IN | BODY MASS INDEX: 26.77 KG/M2 | WEIGHT: 156.8 LBS | DIASTOLIC BLOOD PRESSURE: 70 MMHG | TEMPERATURE: 98 F | OXYGEN SATURATION: 98 %

## 2021-08-04 DIAGNOSIS — R11.0 NAUSEA: ICD-10-CM

## 2021-08-04 DIAGNOSIS — I48.91 ATRIAL FIBRILLATION WITH RAPID VENTRICULAR RESPONSE (HCC): ICD-10-CM

## 2021-08-04 DIAGNOSIS — H81.13 BENIGN PAROXYSMAL POSITIONAL VERTIGO DUE TO BILATERAL VESTIBULAR DISORDER: Primary | ICD-10-CM

## 2021-08-04 DIAGNOSIS — R19.7 DIARRHEA, UNSPECIFIED TYPE: ICD-10-CM

## 2021-08-04 DIAGNOSIS — R42 DIZZINESS: ICD-10-CM

## 2021-08-04 PROCEDURE — 93000 ELECTROCARDIOGRAM COMPLETE: CPT | Performed by: NURSE PRACTITIONER

## 2021-08-04 PROCEDURE — 99214 OFFICE O/P EST MOD 30 MIN: CPT | Performed by: NURSE PRACTITIONER

## 2021-08-04 RX ORDER — MECLIZINE HCL 12.5 MG/1
12.5 TABLET ORAL 3 TIMES DAILY PRN
Qty: 30 TABLET | Refills: 0 | Status: SHIPPED | OUTPATIENT
Start: 2021-08-04 | End: 2021-12-02

## 2021-08-04 NOTE — PATIENT INSTRUCTIONS
How to Perform the Epley Maneuver  The Epley maneuver is an exercise that relieves symptoms of vertigo. Vertigo is the feeling that you or your surroundings are moving when they are not. When you feel vertigo, you may feel like the room is spinning and may have trouble walking. The Epley maneuver is used for a type of vertigo caused by a calcium deposit in a part of the inner ear. The maneuver involves changing head positions to help the deposit move out of the area.  You can do this maneuver at home whenever you have symptoms of vertigo. You can repeat it in 24 hours if your vertigo has not gone away.  Even though the Epley maneuver may relieve your vertigo for a few weeks, it is possible that your symptoms will return. This maneuver relieves vertigo, but it does not relieve dizziness.  What are the risks?  If it is done correctly, the Epley maneuver is considered safe. Sometimes it can lead to dizziness or nausea that goes away after a short time. If you develop other symptoms--such as changes in vision, weakness, or numbness--stop doing the maneuver and call your health care provider.  Supplies needed:  · A bed or table.  · A pillow.  How to do the Epley maneuver         1. Sit on the edge of a bed or table with your back straight and your legs extended or hanging over the edge of the bed or table.  2. Turn your head custodial toward the affected ear or side as told by your health care provider.  3. Lie backward quickly with your head turned until you are lying flat on your back. You may want to position a pillow under your shoulders.  4. Hold this position for at least 30 seconds. If you feel dizzy or have symptoms of vertigo, continue to hold the position until the symptoms stop.  5. Turn your head to the opposite direction until your unaffected ear is facing the floor.  6. Hold this position for at least 30 seconds. If you feel dizzy or have symptoms of vertigo, continue to hold the position until the symptoms  stop.  7. Turn your whole body to the same side as your head so that you are positioned on your side. Your head will now be nearly facedown. Hold for at least 30 seconds. If you feel dizzy or have symptoms of vertigo, continue to hold the position until the symptoms stop.  8. Sit back up.  You can repeat the maneuver in 24 hours if your vertigo does not go away.  Follow these instructions at home:  For 24 hours after doing the Epley maneuver:  · Keep your head in an upright position.  · When lying down to sleep or rest, keep your head raised (elevated) with two or more pillows.  · Avoid excessive neck movements.  Activity  · Do not drive or use machinery if you feel dizzy.  · After doing the Epley maneuver, return to your normal activities as told by your health care provider. Ask your health care provider what activities are safe for you.  General instructions  · Drink enough fluid to keep your urine pale yellow.  · Do not drink alcohol.  · Take over-the-counter and prescription medicines only as told by your health care provider.  · Keep all follow-up visits as told by your health care provider. This is important.  Preventing vertigo symptoms  Ask your health care provider if there is anything you should do at home to prevent vertigo. He or she may recommend that you:  · Keep your head elevated with two or more pillows while you sleep.  · Do not sleep on the side of your affected ear.  · Get up slowly from bed.  · Avoid sudden movements during the day.  · Avoid extreme head positions or movement, such as looking up or bending over.  Contact a health care provider if:  · Your vertigo gets worse.  · You have other symptoms, including:  ? Nausea.  ? Vomiting.  ? Headache.  Get help right away if you:  · Have vision changes.  · Have a headache or neck pain that is severe or getting worse.  · Cannot stop vomiting.  · Have new numbness or weakness in any part of your body.  Summary  · Vertigo is the feeling that you or  your surroundings are moving when they are not.  · The Epley maneuver is an exercise that relieves symptoms of vertigo.  · If the Epley maneuver is done correctly, it is considered safe and relieves vertigo quickly.  This information is not intended to replace advice given to you by your health care provider. Make sure you discuss any questions you have with your health care provider.  Document Revised: 10/14/2020 Document Reviewed: 10/14/2020  Elsevier Patient Education © 2021 [employer]

## 2021-08-04 NOTE — PROGRESS NOTES
Acute Office Visit      Patient Name: Diana Flores  : 1965   MRN: [id number]     Chief Complaint:    Chief Complaint   Patient presents with   • Dizziness     symptoms started around 2am, nausea, had diarrhea Monday - it is light today        History of Present Illness: Diana Flores is a 55 y.o. female who presents with dizziness that began this morning when she woke up at 2 am.  She has noted it more when changes position, moves her head. When she moves slowly, dizziness is less.   Left ear it usually itchy, due to allergies.  Otherwise no ear discomfort, fullness, pain.  She had diarrhea 2 days ago, feels it might be contributing to dizziness.  She has also noted occasional nausea, worse with dizziness.  When dizzy, states the floor moves.  She feels she must hold on to something or she will fall.  She is accompanied by her  today. Denies chest pain, dyspnea, orthopnea, palpitations, lower extremity edema, confusion, headaches, weakness, visual disturbances.  Has A fib, takes flecainide and metoprolol daily as prescribed.  No medication changes.      Subjective      I have reviewed and the following portions of the patient's history were updated as appropriate: past family history, past medical history, past social history, past surgical history and problem list.      Current Outpatient Medications:   •  aspirin 81 MG EC tablet, Take 1 tablet by mouth Daily., Disp: 30 tablet, Rfl: 0  •  atorvastatin (LIPITOR) 40 MG tablet, Take 1 tablet by mouth every night at bedtime., Disp: 90 tablet, Rfl: 3  •  Biotin w/ Vitamins C & E (HAIR SKIN & NAILS GUMMIES PO), , Disp: , Rfl:   •  Calcium Carbonate-Vit D-Min (CALCIUM 1200 PO), Take 1,200 mg by mouth., Disp: , Rfl:   •  flecainide (TAMBOCOR) 50 MG tablet, Take 1 tablet by mouth Every 12 (Twelve) Hours., Disp: 180 tablet, Rfl: 3  •  metoprolol succinate XL (TOPROL-XL) 25 MG 24 hr tablet, Take 1 tablet by mouth Daily. , Disp: 90  "tablet, Rfl: 3  •  meclizine (ANTIVERT) 12.5 MG tablet, Take 1 tablet by mouth 3 (Three) Times a Day As Needed for Dizziness. May increase to 25 mg if needed., Disp: 30 tablet, Rfl: 0    No Known Allergies    Objective     Physical Exam:  Vital Signs:   Vitals:    08/04/21 1135 08/04/21 1159   BP: 123/70    BP Location:  Right arm   Patient Position:  Standing   Pulse: 57    Temp: 98 °F (36.7 °C)    TempSrc: Infrared    SpO2: 98%    Weight: 71.1 kg (156 lb 12.8 oz)    Height: 162.6 cm (64\")    PainSc: 0-No pain      Body mass index is 26.91 kg/m².     Vitals:    08/04/21 1157 08/04/21 1158 08/04/21 1159   Orthostatic BP: 129/69 154/69 130/74   Orthostatic Pulse: 54 55 58   Patient Position: Lying Sitting Standing       Physical Exam  Constitutional:       Appearance: She is not ill-appearing.   HENT:      Head: Normocephalic.      Right Ear: Tympanic membrane, ear canal and external ear normal.      Left Ear: Tympanic membrane, ear canal and external ear normal.      Ears:      Comments: Adebayo-Hallpike positive, bilaterally     Nose: Nose normal.   Eyes:      Extraocular Movements: Extraocular movements intact.      Conjunctiva/sclera: Conjunctivae normal.      Pupils: Pupils are equal, round, and reactive to light.   Cardiovascular:      Rate and Rhythm: Regular rhythm. Bradycardia present.      Pulses:           Dorsalis pedis pulses are 2+ on the right side and 2+ on the left side.        Posterior tibial pulses are 2+ on the right side and 2+ on the left side.      Heart sounds: Normal heart sounds.   Pulmonary:      Effort: Pulmonary effort is normal.      Breath sounds: Normal breath sounds.   Abdominal:      General: Bowel sounds are normal. There is no distension.      Tenderness: There is no abdominal tenderness.   Musculoskeletal:      Cervical back: Normal range of motion and neck supple.   Skin:     General: Skin is warm.      Capillary Refill: Capillary refill takes less than 2 seconds.   Neurological:    "   Mental Status: She is alert and oriented to person, place, and time.      Coordination: Coordination normal.      Gait: Gait normal.      Comments: CN II-XII normal.  Bilateral , pushes, pulls are equal.   Psychiatric:         Mood and Affect: Mood normal.         Behavior: Behavior normal.         Thought Content: Thought content normal.           ECG 12 Lead    Date/Time: 8/4/2021 12:03 PM  Performed by: Rachel Maldonado APRN  Authorized by: Rachel Maldonado APRN   Comparison: compared with previous ECG from 6/1/2021  Comparison to previous ECG: Similar with exception of rate  Rhythm: sinus bradycardia  Rate: bradycardic  BPM: 54  QRS axis: left  Other findings: left ventricular hypertrophy    Clinical impression: abnormal EKG          Assessment / Plan      Assessment/Plan:   Diagnoses and all orders for this visit:    1. Benign paroxysmal positional vertigo due to bilateral vestibular disorder (Primary)  -     meclizine (ANTIVERT) 12.5 MG tablet; Take 1 tablet by mouth 3 (Three) Times a Day As Needed for Dizziness. May increase to 25 mg if needed.  Dispense: 30 tablet; Refill: 0  - Advised to perform Epley maneuver 2-3 times a day, when dizziness improves in the next 3 to 5 days    2. Dizziness        - Meclizine, slow position changes    3. Nausea        - [place] diet, avoid greasy/spicy foods and dairy until symptoms improve.  Then, may advance diet as tolerated        - Drink plenty of clear, decaffeinated fluids, as tolerated.        - Meclizine as prescribed    4. Diarrhea, unspecified type        - Stay well hydrated, one small sports drink daily    5. Atrial fibrillation with rapid ventricular response (CMS/HCC)  -     ECG 12 Lead  - Encouraged to monitor BP, pulse at home.  Go to ED should dizziness worsen      Follow Up:   Return if symptoms worsen or fail to improve.    Patient was given instructions and counseling regarding her condition or for health maintenance advice. Please see  specific information pulled into the AVS if appropriate.       Primary Care [address] [facility]     Please note that portions of this note may have been completed with a voice recognition program. Efforts were made to edit dictation, but occasionally words are mistranscribed.

## 2021-12-02 ENCOUNTER — OFFICE VISIT (OUTPATIENT)
Dept: CARDIOLOGY | Facility: CLINIC | Age: 56
End: 2021-12-02

## 2021-12-02 VITALS
DIASTOLIC BLOOD PRESSURE: 66 MMHG | OXYGEN SATURATION: 98 % | WEIGHT: 155 LBS | SYSTOLIC BLOOD PRESSURE: 118 MMHG | HEIGHT: 64 IN | RESPIRATION RATE: 18 BRPM | BODY MASS INDEX: 26.46 KG/M2 | HEART RATE: 56 BPM

## 2021-12-02 DIAGNOSIS — E78.00 PURE HYPERCHOLESTEROLEMIA: ICD-10-CM

## 2021-12-02 DIAGNOSIS — I48.0 PAROXYSMAL ATRIAL FIBRILLATION WITH RVR (HCC): Primary | ICD-10-CM

## 2021-12-02 PROCEDURE — 99214 OFFICE O/P EST MOD 30 MIN: CPT | Performed by: INTERNAL MEDICINE

## 2021-12-02 PROCEDURE — 93000 ELECTROCARDIOGRAM COMPLETE: CPT | Performed by: INTERNAL MEDICINE

## 2021-12-02 NOTE — PROGRESS NOTES
[facility] Cardiology Office Follow Up Note    Diana Flores  [id number]  2021    Primary Care Provider: Deon Garcia MD    Chief Complaint: Routine follow-up    History of Present Illness:   Mrs. Diana Flores is a 56 y.o. female who presents to the Cardiology Clinic for  routine follow-up.  The patient has a past medical history significant for hyperlipidemia.  She has a past cardiac history significant for paroxysmal atrial fibrillation, initially presenting in 2019 when she spontaneously converted to sinus rhythm.  She presents to cardiology clinic today for routine follow-up.  Since her last appointment, the patient reports she has been well with no significant changes in her health.  She denies any significant episodes of palpitations to suggest recurrent PAF.  No chest pain or exertional anginal symptoms.  No history of orthopnea, PND, or lower extremity swelling.  She continues to tolerate her current cardiac medications without difficulty.  No specific complaints today.     Past Cardiac Testin. Last Coronary Angio: None  2. Prior Stress Testing:  GXT 3/22/2021              1.  Normal exercise treadmill stress test.              2.  Normal chronotropic and BP response to exercise.              3.  Average exercise capacity, reaching 10.1 METS.              4.  Low risk Duke treadmill score.  3. Last Echo: 2019              1.  Normal left ventricular systolic function              2.  Trace MR and TR  4. Prior Holter Monitor: None    Review of Systems:   Review of Systems   Constitutional: Negative for activity change, appetite change, chills, diaphoresis, fatigue, fever, unexpected weight gain and unexpected weight loss.   Eyes: Negative for blurred vision and double vision.   Respiratory: Negative for cough, chest tightness, shortness of breath and wheezing.    Cardiovascular: Negative for chest pain, palpitations and leg swelling.  "  Gastrointestinal: Negative for abdominal pain, anal bleeding, blood in stool and GERD.   Endocrine: Negative for cold intolerance and heat intolerance.   Genitourinary: Negative for hematuria.   Neurological: Negative for dizziness, syncope, weakness and light-headedness.   Hematological: Does not bruise/bleed easily.   Psychiatric/Behavioral: Negative for depressed mood and stress. The patient is not nervous/anxious.        I have reviewed and/or updated the patient's past medical, past surgical, family, social history, problem list and allergies as appropriate.     Medications:     Current Outpatient Medications:   •  aspirin 81 MG EC tablet, Take 1 tablet by mouth Daily., Disp: 30 tablet, Rfl: 0  •  atorvastatin (LIPITOR) 40 MG tablet, Take 1 tablet by mouth every night at bedtime., Disp: 90 tablet, Rfl: 3  •  Biotin w/ Vitamins C & E (HAIR SKIN & NAILS GUMMIES PO), Daily., Disp: , Rfl:   •  Calcium Carbonate-Vit D-Min (CALCIUM 1200 PO), Take 1,200 mg by mouth., Disp: , Rfl:   •  flecainide (TAMBOCOR) 50 MG tablet, Take 1 tablet by mouth Every 12 (Twelve) Hours., Disp: 180 tablet, Rfl: 3  •  metoprolol succinate XL (TOPROL-XL) 25 MG 24 hr tablet, Take 1 tablet by mouth Daily. 200001, Disp: 90 tablet, Rfl: 3    Physical Exam:  Vital Signs:   Vitals:    12/02/21 1124   BP: 118/66   BP Location: Right arm   Patient Position: Sitting   Cuff Size: Adult   Pulse: 56   Resp: 18   SpO2: 98%   Weight: 70.3 kg (155 lb)   Height: 162.6 cm (64.02\")       Physical Exam  Constitutional:       General: She is not in acute distress.     Appearance: Normal appearance. She is well-developed. She is not diaphoretic.   HENT:      Head: Normocephalic and atraumatic.   Eyes:      General: No scleral icterus.     Pupils: Pupils are equal, round, and reactive to light.   Neck:      Trachea: No tracheal deviation.   Cardiovascular:      Rate and Rhythm: Normal rate and regular rhythm.      Heart sounds: Normal heart sounds. No murmur " heard.  No friction rub. No gallop.       Comments: Normal JVD.  Pulmonary:      Effort: Pulmonary effort is normal. No respiratory distress.      Breath sounds: Normal breath sounds. No stridor. No wheezing or rales.   Chest:      Chest wall: No tenderness.   Abdominal:      General: Bowel sounds are normal. There is no distension.      Palpations: Abdomen is soft.      Tenderness: There is no abdominal tenderness. There is no guarding or rebound.   Musculoskeletal:         General: No swelling. Normal range of motion.      Cervical back: Neck supple. No tenderness.   Lymphadenopathy:      Cervical: No cervical adenopathy.   Skin:     General: Skin is warm and dry.      Findings: No erythema.   Neurological:      General: No focal deficit present.      Mental Status: She is alert and oriented to person, place, and time.   Psychiatric:         Mood and Affect: Mood normal.         Behavior: Behavior normal.         Results Review:   I reviewed the patient's new clinical results.  I personally viewed and interpreted the patient's EKG/Telemetry data      ECG 12 Lead    Date/Time: 12/2/2021 12:17 PM  Performed by: Jaylon Crouch MD  Authorized by: Jaylon Crouch MD   Comparison: not compared with previous ECG   Rhythm: sinus bradycardia  Rate: bradycardic  QRS axis: normal  Clinical impression comment: Normal ECG other than mild bradycardia              Assessment / Plan:     1.  Paroxysmal atrial fibrillation with rapid ventricular rates  --No recent sustained palpitations to suggest recurrent PAF/RVR  --ECG today shows mild sinus bradycardia  --Continue low-dose flecainide for rhythm control strategy  --Continue metoprolol XL  --[id number] score is 1,  actually discussed the risks and benefits of therapeutic anticoagulation and the patient has opted for aspirin monotherapy at this time  --Follow-up in 1 year, sooner if required     2.  Hyperlipidemia  --Last lipid profile available for review in 6/20, LDL  99, triglycerides 133, HDL 73  --Will repeat lipid profile  --As the patient hopes to discontinue statin in the future, will down titrate atorvastatin to 20 mg if lipid panel is well controlled       Follow Up:   Return in about 1 year (around 12/2/2022).      Thank you for allowing me to participate in the care of your patient. Please to not hesitate to contact me with additional questions or concerns.     CORBY Crouch MD  Interventional Cardiology   12/02/2021  11:44 EST

## 2021-12-17 ENCOUNTER — TELEPHONE (OUTPATIENT)
Dept: CARDIOLOGY | Facility: CLINIC | Age: 56
End: 2021-12-17

## 2021-12-17 NOTE — TELEPHONE ENCOUNTER
"Called patient in regards to Lipid panel that was ordered by Dr. Crouch. Patient states she has not had completed yet but will \"most likely completely it sometime between now and [place] time\"  "

## 2022-02-20 DIAGNOSIS — E78.00 PURE HYPERCHOLESTEROLEMIA: ICD-10-CM

## 2022-02-21 RX ORDER — ATORVASTATIN CALCIUM 40 MG/1
TABLET, FILM COATED ORAL
Qty: 90 TABLET | Refills: 0 | Status: SHIPPED | OUTPATIENT
Start: 2022-02-21 | End: 2022-04-13

## 2022-02-21 NOTE — TELEPHONE ENCOUNTER
Rx Refill Note  Requested Prescriptions     Pending Prescriptions Disp Refills   • atorvastatin (LIPITOR) 40 MG tablet [Pharmacy Med Name: Atorvastatin Calcium Oral Tablet 40 MG] 90 tablet 0     Sig: TAKE 1 TABLET BY MOUTH AT BEDTIME      Last office visit with prescribing clinician: 08/04/2021 with extender  Next office visit with prescribing clinician: Visit date not found            Marie Weiss MA  02/21/22, 11:07 EST

## 2022-04-13 DIAGNOSIS — E78.00 PURE HYPERCHOLESTEROLEMIA: ICD-10-CM

## 2022-04-13 RX ORDER — ATORVASTATIN CALCIUM 40 MG/1
TABLET, FILM COATED ORAL
Qty: 90 TABLET | Refills: 0 | Status: SHIPPED | OUTPATIENT
Start: 2022-04-13 | End: 2022-06-08 | Stop reason: SDUPTHER

## 2022-04-13 NOTE — TELEPHONE ENCOUNTER
Rx Refill Note  Requested Prescriptions     Pending Prescriptions Disp Refills   • atorvastatin (LIPITOR) 40 MG tablet [Pharmacy Med Name: Atorvastatin Calcium Oral Tablet 40 MG] 90 tablet 0     Sig: TAKE 1 TABLET BY MOUTH AT BEDTIME      Last office visit with prescribing clinician: 8/4/2021 via ZACK Parker     Next office visit with prescribing clinician: Visit date not found            MAIKOL LUCAS MA  04/13/22, 10:55 EDT

## 2022-06-08 DIAGNOSIS — E78.00 PURE HYPERCHOLESTEROLEMIA: ICD-10-CM

## 2022-06-08 RX ORDER — ATORVASTATIN CALCIUM 40 MG/1
40 TABLET, FILM COATED ORAL
Qty: 30 TABLET | Refills: 0 | Status: SHIPPED | OUTPATIENT
Start: 2022-06-08 | End: 2022-07-18 | Stop reason: SDUPTHER

## 2022-06-08 NOTE — TELEPHONE ENCOUNTER
Pt requested a refill on her lipitor. States she recently had a lipid panel drawn and she will bring those results by.

## 2022-07-18 DIAGNOSIS — E78.00 PURE HYPERCHOLESTEROLEMIA: ICD-10-CM

## 2022-07-18 RX ORDER — ATORVASTATIN CALCIUM 40 MG/1
40 TABLET, FILM COATED ORAL
Qty: 30 TABLET | Refills: 11 | Status: SHIPPED | OUTPATIENT
Start: 2022-07-18 | End: 2023-02-14

## 2022-08-05 ENCOUNTER — TELEPHONE (OUTPATIENT)
Dept: CARDIOLOGY | Facility: CLINIC | Age: 57
End: 2022-08-05

## 2022-08-05 DIAGNOSIS — I48.0 PAROXYSMAL ATRIAL FIBRILLATION WITH RVR: ICD-10-CM

## 2022-08-05 RX ORDER — FLECAINIDE ACETATE 50 MG/1
50 TABLET ORAL EVERY 12 HOURS
Qty: 180 TABLET | Refills: 1 | Status: SHIPPED | OUTPATIENT
Start: 2022-08-05 | End: 2023-02-10

## 2022-08-05 NOTE — TELEPHONE ENCOUNTER
Caller: STARR     Relationship: SELF     Best call back number: [phone]    Requested Prescriptions:   Requested Prescriptions     Pending Prescriptions Disp Refills   • flecainide (TAMBOCOR) 50 MG tablet 180 tablet 3     Sig: Take 1 tablet by mouth Every 12 (Twelve) Hours.        Pharmacy where request should be sent:    [facility]   Additional details provided by patient: PT HAS ONE DOSE LEFT OF THIS MEDICATION.   Does the patient have less than a 3 day supply:  [x] Yes  [] No    Joan PEPE Rep   08/05/22 09:16 EDT

## 2022-08-18 PROCEDURE — U0004 COV-19 TEST NON-CDC HGH THRU: HCPCS | Performed by: NURSE PRACTITIONER

## 2022-08-26 DIAGNOSIS — I48.0 PAROXYSMAL ATRIAL FIBRILLATION WITH RVR: ICD-10-CM

## 2022-08-26 RX ORDER — METOPROLOL SUCCINATE 25 MG/1
25 TABLET, EXTENDED RELEASE ORAL DAILY
Qty: 90 TABLET | Refills: 3 | Status: SHIPPED | OUTPATIENT
Start: 2022-08-26 | End: 2023-02-14 | Stop reason: SDUPTHER

## 2023-02-09 DIAGNOSIS — I48.0 PAROXYSMAL ATRIAL FIBRILLATION WITH RVR: ICD-10-CM

## 2023-02-10 RX ORDER — FLECAINIDE ACETATE 50 MG/1
TABLET ORAL
Qty: 180 TABLET | Refills: 0 | Status: SHIPPED | OUTPATIENT
Start: 2023-02-10 | End: 2023-02-14 | Stop reason: SDUPTHER

## 2023-02-14 ENCOUNTER — OFFICE VISIT (OUTPATIENT)
Dept: CARDIOLOGY | Facility: CLINIC | Age: 58
End: 2023-02-14
Payer: COMMERCIAL

## 2023-02-14 VITALS
HEIGHT: 64 IN | DIASTOLIC BLOOD PRESSURE: 84 MMHG | OXYGEN SATURATION: 95 % | WEIGHT: 161 LBS | RESPIRATION RATE: 16 BRPM | HEART RATE: 78 BPM | BODY MASS INDEX: 27.49 KG/M2 | SYSTOLIC BLOOD PRESSURE: 140 MMHG

## 2023-02-14 DIAGNOSIS — E78.00 PURE HYPERCHOLESTEROLEMIA: ICD-10-CM

## 2023-02-14 DIAGNOSIS — G47.33 OSA (OBSTRUCTIVE SLEEP APNEA): ICD-10-CM

## 2023-02-14 DIAGNOSIS — I48.0 PAROXYSMAL ATRIAL FIBRILLATION WITH RVR: Primary | ICD-10-CM

## 2023-02-14 PROCEDURE — 99214 OFFICE O/P EST MOD 30 MIN: CPT | Performed by: INTERNAL MEDICINE

## 2023-02-14 PROCEDURE — 93000 ELECTROCARDIOGRAM COMPLETE: CPT | Performed by: INTERNAL MEDICINE

## 2023-02-14 RX ORDER — ATORVASTATIN CALCIUM 40 MG/1
20 TABLET, FILM COATED ORAL
Qty: 90 TABLET | Refills: 5 | Status: SHIPPED | OUTPATIENT
Start: 2023-02-14

## 2023-02-14 RX ORDER — METOPROLOL SUCCINATE 25 MG/1
25 TABLET, EXTENDED RELEASE ORAL DAILY
Qty: 90 TABLET | Refills: 3 | Status: SHIPPED | OUTPATIENT
Start: 2023-02-14

## 2023-02-14 RX ORDER — FLECAINIDE ACETATE 50 MG/1
50 TABLET ORAL EVERY 12 HOURS
Qty: 180 TABLET | Refills: 0 | Status: SHIPPED | OUTPATIENT
Start: 2023-02-14

## 2023-02-14 NOTE — PROGRESS NOTES
"             [facility] Office Follow Up Note    Diana Flores  [id number]  2023    Primary Care Provider: Deon Garcia MD    Chief Complaint: Routine follow-up    History of Present Illness:   Mrs. Diana Flores is a 57 y.o. female who presents to the Cardiology Clinic for routine follow-up.  The patient has a past medical history significant for hyperlipidemia.  She has a past cardiac history significant for paroxysmal atrial fibrillation, initially presenting in 2019 when she spontaneously converted to sinus rhythm.  She returns to the cardiology clinic today for routine follow-up.  Since her last appointment, the patient reports he has been well without significant changes in her health.  In terms of her atrial fibrillation, she has not had any episodes of sustained palpitations.  She does report rare, brief \"fluttering\" sensations which last 1 to 2 seconds before resolving spontaneously.  No associated dizziness or lightheadedness.  She remains active, without exertional angina.  No other specific complaints today.    Past Cardiac Testin. Last Coronary Angio: None  2. Prior Stress Testing:  [facility] 3/22/2021              1.  Normal exercise treadmill stress test.              2.  Normal chronotropic and BP response to exercise.              3.  Average exercise capacity, reaching 10.1 METS.              4.  Low risk Duke treadmill score.  3. Last Echo: 2019              1.  Normal left ventricular systolic function              2.  Trace MR and TR  4. Prior Holter Monitor: None    Review of Systems:   Review of Systems   Constitutional: Negative for activity change, appetite change, chills, diaphoresis, fatigue, fever, unexpected weight gain and unexpected weight loss.   HENT:        Frequent snoring   Eyes: Negative for blurred vision and double vision.   Respiratory: Negative for cough, chest tightness, shortness of breath and wheezing.    Cardiovascular: " "Negative for chest pain, palpitations and leg swelling.   Gastrointestinal: Negative for abdominal pain, anal bleeding, blood in stool and GERD.   Endocrine: Negative for cold intolerance and heat intolerance.   Genitourinary: Negative for hematuria.   Neurological: Negative for dizziness, syncope, weakness and light-headedness.   Hematological: Does not bruise/bleed easily.   Psychiatric/Behavioral: Negative for depressed mood and stress. The patient is not nervous/anxious.        I have reviewed and/or updated the patient's past medical, past surgical, family, social history, problem list and allergies as appropriate.     Medications:     Current Outpatient Medications:   •  aspirin 81 MG EC tablet, Take 1 tablet by mouth Daily., Disp: 30 tablet, Rfl: 0  •  atorvastatin (LIPITOR) 40 MG tablet, Take 0.5 tablets by mouth every night at bedtime., Disp: 90 tablet, Rfl: 5  •  Biotin w/ Vitamins C & E (HAIR SKIN & NAILS GUMMIES PO), Daily., Disp: , Rfl:   •  Calcium Carbonate-Vit D-Min (CALCIUM 1200 PO), Take 1,200 mg by mouth., Disp: , Rfl:   •  flecainide (TAMBOCOR) 50 MG tablet, Take 1 tablet by mouth Every 12 (Twelve) Hours., Disp: 180 tablet, Rfl: 0  •  metoprolol succinate XL (TOPROL-XL) 25 MG 24 hr tablet, Take 1 tablet by mouth Daily. 200001, Disp: 90 tablet, Rfl: 3    Physical Exam:  Vital Signs:   Vitals:    02/14/23 1140   BP: 140/84   BP Location: Left arm   Patient Position: Sitting   Pulse: 78   Resp: 16   SpO2: 95%   Weight: 73 kg (161 lb)   Height: 162.6 cm (64\")       Physical Exam  Constitutional:       General: She is not in acute distress.     Appearance: Normal appearance. She is well-developed. She is not diaphoretic.   HENT:      Head: Normocephalic and atraumatic.   Eyes:      General: No scleral icterus.     Pupils: Pupils are equal, round, and reactive to light.   Neck:      Trachea: No tracheal deviation.   Cardiovascular:      Rate and Rhythm: Normal rate and regular rhythm.      Heart " sounds: Normal heart sounds. No murmur heard.    No friction rub. No gallop.      Comments: Normal JVD.  Pulmonary:      Effort: Pulmonary effort is normal. No respiratory distress.      Breath sounds: Normal breath sounds. No stridor. No wheezing or rales.   Chest:      Chest wall: No tenderness.   Abdominal:      General: Bowel sounds are normal. There is no distension.      Palpations: Abdomen is soft.      Tenderness: There is no abdominal tenderness. There is no guarding or rebound.   Musculoskeletal:         General: No swelling. Normal range of motion.      Cervical back: Neck supple. No tenderness.   Lymphadenopathy:      Cervical: No cervical adenopathy.   Skin:     General: Skin is warm and dry.      Findings: No erythema.   Neurological:      General: No focal deficit present.      Mental Status: She is alert and oriented to person, place, and time.   Psychiatric:         Mood and Affect: Mood normal.         Behavior: Behavior normal.         Results Review:   I reviewed the patient's new clinical results.  I personally viewed and interpreted the patient's EKG/Telemetry data      ECG 12 Lead    Date/Time: 2/14/2023 12:10 PM  Performed by: Jaylon Crouch MD  Authorized by: Jaylon Crouch MD   Comparison: not compared with previous ECG   Rhythm: sinus rhythm  Rate: normal  QRS axis: left  Clinical impression comment: Borderline ECG              Assessment / Plan:     1.  Paroxysmal atrial fibrillation with rapid ventricular rates  -- ECG today continues to show NSR  -- No recent episodes of palpitations or symptoms to suggest recurrent symptomatic PAF  --Continue flecainide for rhythm control strategy  --Continue metoprolol XL  --[id number] score is 1,   the patient has deferred anticoagulation at this time  --Follow-up in 1 year, sooner if required     2.  Hyperlipidemia  -- Last lipid profile in 3/22 showed , triglycerides 84, HDL 69  -- Given the patient wishes to wean atorvastatin,  will decrease to 20 mg nightly  -- Repeat lipid profile in 6 months to reassess after decreasing atorvastatin    3.  Possible REINALDO  --The patient reports frequent snoring  --Will refer to pulmonology for home sleep study      Follow Up:   Return in about 1 year (around 2/14/2024).      Thank you for allowing me to participate in the care of your patient. Please to not hesitate to contact me with additional questions or concerns.     CORBY Crouch MD  Interventional Cardiology   02/14/2023  11:38 EST

## 2023-02-16 ENCOUNTER — TELEPHONE (OUTPATIENT)
Dept: CARDIOLOGY | Facility: CLINIC | Age: 58
End: 2023-02-16
Payer: COMMERCIAL

## 2023-02-16 NOTE — TELEPHONE ENCOUNTER
----- Message from Lucy Tucker MA sent at 2/14/2023  3:51 PM EST -----  Regarding: FW: Magnesium  Contact: [phone]  Please advise  ----- Message -----  From: Diana Flores  Sent: 2/14/2023   3:49 PM EST  To: [facility]  Subject: Magnesium                                        Hello Doctor Miko I did forget to ask you if I am able to take Magnesium I completely forgot.  Just let me know.    Thank you.

## 2023-04-24 ENCOUNTER — TELEPHONE (OUTPATIENT)
Dept: CARDIOLOGY | Facility: CLINIC | Age: 58
End: 2023-04-24
Payer: COMMERCIAL

## 2023-04-24 NOTE — TELEPHONE ENCOUNTER
----- Message from Trinity Guerrero MA sent at 4/24/2023  7:57 AM EDT -----  Regarding: FW: CBD Drops  Contact: [phone]    ----- Message -----  From: Diana Flores  Sent: 4/23/2023  11:52 AM EDT  To: [facility]  Subject: CBD Drops                                        Hello question for my cardiologist: I keep forgetting to ask but am I able to take CBD drops for my lower back pain.  I have taken other medications but now would like to see if it’s ok for me to try the CBD drops.    Thank you.

## 2023-05-04 ENCOUNTER — PATIENT ROUNDING (BHMG ONLY) (OUTPATIENT)
Dept: PULMONOLOGY | Facility: CLINIC | Age: 58
End: 2023-05-04
Payer: COMMERCIAL

## 2023-05-04 ENCOUNTER — OFFICE VISIT (OUTPATIENT)
Dept: PULMONOLOGY | Facility: CLINIC | Age: 58
End: 2023-05-04
Payer: COMMERCIAL

## 2023-05-04 VITALS
RESPIRATION RATE: 18 BRPM | HEIGHT: 64 IN | SYSTOLIC BLOOD PRESSURE: 127 MMHG | HEART RATE: 85 BPM | OXYGEN SATURATION: 96 % | WEIGHT: 189 LBS | BODY MASS INDEX: 32.27 KG/M2 | DIASTOLIC BLOOD PRESSURE: 76 MMHG

## 2023-05-04 DIAGNOSIS — R06.83 SNORING: ICD-10-CM

## 2023-05-04 DIAGNOSIS — G47.33 OBSTRUCTIVE SLEEP APNEA: Primary | ICD-10-CM

## 2023-05-04 DIAGNOSIS — E66.9 OBESITY (BMI 30-39.9): ICD-10-CM

## 2023-05-04 DIAGNOSIS — G47.52 DREAM ENACTMENT BEHAVIOR: ICD-10-CM

## 2023-05-04 DIAGNOSIS — G47.19 EXCESSIVE DAYTIME SLEEPINESS: ICD-10-CM

## 2023-05-04 NOTE — PROGRESS NOTES
CONSULT NOTE    Requested by:   No ref. provider found   Deon Garcia MD      Chief Complaint   Patient presents with   • Sleeping Problem   • Consult       Subjective:  Diana Flores is a 57 y.o. female.     History of Present Illness   Patient came in today for evaluation of possible sleep apnea. Patient says that for the past few years she snores loudly and has woken up in the middle of the night gasping for breath and sometimes with a choking sensation. Also, the patient's family notes that she has occasional pauses in the breathing.     she feels that she doesn't get restful night sleep and her quality has diminished considerably. she does feel sleepy watching TV and reading a book.      she is not complaining of occasional headaches. Patient mentions having rare nights when she act out her dreams.     There is no known family history of sleep apnea.     her Epsworth Sleepiness score was 6 /24.     Patient suffers from hypertension & atrial fibrillation    she drinks 2 cups/cans of caffeinated drinks per day.      The following portions of the patient's history were reviewed and updated as appropriate: allergies, current medications, past family history, past medical history, past social history and past surgical history.    Review of Systems   HENT: Negative for sinus pressure, sneezing and sore throat.    Respiratory: Negative for cough, chest tightness, shortness of breath and wheezing.    Cardiovascular: Positive for palpitations. Negative for leg swelling.   Psychiatric/Behavioral: Positive for sleep disturbance (some).   All other systems reviewed and are negative.      Past Medical History:   Diagnosis Date   • Arthritis    • Diverticulosis 03/2017   • Hyperlipidemia    • Influenza A 01/29/2019   • Intermittent palpitations    • Lower back pain    • Lumbar spondylosis    • MVP (mitral valve prolapse)    • Osteopenia    • Paroxysmal atrial fibrillation with RVR 07/10/2019   • Prediabetes    •  "Sinus bradycardia    • Snores    • Wears glasses        Social History     Tobacco Use   • Smoking status: Never   • Smokeless tobacco: Never   Substance Use Topics   • Alcohol use: Yes     Alcohol/week: 2.0 standard drinks     Types: 1 Shots of liquor, 1 Drinks containing 0.5 oz of alcohol per week     Comment: Drink socially every now and then.         Objective:  Visit Vitals  /76   Pulse 85   Resp 18   Ht 162.6 cm (64.02\")   Wt 85.7 kg (189 lb)   SpO2 96%   BMI 32.43 kg/m²       Physical Exam  Vitals reviewed.   Constitutional:       Appearance: She is well-developed.   HENT:      Head: Atraumatic.      Mouth/Throat:      Comments: Oropharynx was crowded.  Eyes:      Pupils: Pupils are equal, round, and reactive to light.   Neck:      Thyroid: No thyromegaly.      Vascular: No JVD.      Trachea: No tracheal deviation.   Cardiovascular:      Rate and Rhythm: Normal rate and regular rhythm.   Pulmonary:      Effort: Pulmonary effort is normal. No respiratory distress.      Breath sounds: Normal breath sounds.   Musculoskeletal:      Right lower leg: No edema.      Left lower leg: No edema.      Comments: Gait was normal.   Skin:     General: Skin is warm and dry.   Neurological:      Mental Status: She is alert and oriented to person, place, and time.   Psychiatric:         Mood and Affect: Mood normal.         Behavior: Behavior normal.           Assessment/Plan:  Diagnoses and all orders for this visit:    1. Obstructive sleep apnea (Primary)  -     Home Sleep Study; Future    2. Snoring  -     Home Sleep Study; Future    3. Excessive daytime sleepiness  -     Home Sleep Study; Future    4. Obesity (BMI 30-39.9)  -     Home Sleep Study; Future    5. Dream enactment behavior  -     Home Sleep Study; Future        Return in about 4 months (around 8/30/2023) for Sleep study, Stiven/Katlyn, ....Also 13-14 mths w/ Dr. Pitt.    DISCUSSION(if any):  Laboratory workup was also reviewed which showed   Lab " Results   Component Value Date    CO2 20.3 (L) 01/16/2021   ,   Lab Results   Component Value Date    HGBA1C 5.7 07/11/2019     Laboratory workup also showed   Lab Results   Component Value Date    HGB 14.3 01/16/2021    HGB 13.6 02/24/2020    HGB 12.8 07/11/2019   ,   Lab Results   Component Value Date    HCT 43.0 01/16/2021    HCT 42.1 02/24/2020    HCT 38.9 07/11/2019   ,   Lab Results   Component Value Date    TSH 1.380 01/16/2021    TSH 1.890 07/11/2019    TSH 1.440 07/10/2019       Referring physicians office note was also reviewed that did mention frequent snoring and possible sleep apnea    ===========================  ===========================    Sleep questionnaire was reviewed with the patient    The pathophysiology of sleep apnea was discussed, with the patient.     We will encourage her to schedule the sleep study soon.     The patient was made aware of the limitation of the home sleep study, whereby it may underestimate the true AHI and also carries a low sensitivity.  I have informed her that even if the home sleep study is negative, we may suggest an in lab sleep study to completely and definitively rule out/in sleep apnea.  The patient has understood.  This was communicated to the patient, in case home study is to be requested.    The patient is agreeable to try CPAP/BiPAP, if needed.     Patient was educated on good sleep hygiene measures and voiced understanding of the same.     Patient was given reading material regarding sleep apnea    Patient was counseled regarding weight loss.       Dictated utilizing Dragon dictation.    This document was electronically signed by Alexa Pitt MD on 05/04/23 at 12:01 EDT

## 2023-05-15 DIAGNOSIS — I48.0 PAROXYSMAL ATRIAL FIBRILLATION WITH RVR: ICD-10-CM

## 2023-05-16 RX ORDER — FLECAINIDE ACETATE 50 MG/1
50 TABLET ORAL EVERY 12 HOURS
Qty: 180 TABLET | Refills: 2 | Status: SHIPPED | OUTPATIENT
Start: 2023-05-16

## 2023-07-03 ENCOUNTER — TELEPHONE (OUTPATIENT)
Dept: PULMONOLOGY | Facility: CLINIC | Age: 58
End: 2023-07-03

## 2023-07-03 NOTE — TELEPHONE ENCOUNTER
Caller: Diana Flores    Relationship to patient: Self    Best call back number: [phone]    Patient is needing: PATIENT WAS RETURNING A CALL FROM [facility] TO DISCUSS RESULTS. PLEASE CALL BACK.

## 2023-07-20 ENCOUNTER — TELEPHONE (OUTPATIENT)
Dept: PULMONOLOGY | Facility: CLINIC | Age: 58
End: 2023-07-20

## 2023-07-20 NOTE — TELEPHONE ENCOUNTER
Caller: Diana Flores    Relationship to patient: Self    Best call back number: [phone]    Type of visit: COMPLIANCE F/U, STARTED PAP THERAPY 7/17/23.    If rescheduling, when is the original appointment: 11/15/23    Additional notes:  HUB UNABLE TO FIND AVAILABILITY WITHIN APPROPRIATE TIMEFRAME.  PLEASE CALL PATIENT TO RESCHEDULE.  THANK YOU.

## 2023-08-09 ENCOUNTER — TELEPHONE (OUTPATIENT)
Dept: PULMONOLOGY | Facility: CLINIC | Age: 58
End: 2023-08-09

## 2023-08-09 NOTE — TELEPHONE ENCOUNTER
Has the patient been seen in the last 6 months? YES  If the patient has not been seen and the machine is broken, schedule an appointment and alert the practice  Current issue/concern with equipment: SWITCH TO FULL FACE EQUIPMENT  DME company: [employer]  What kind of mask type (full or nasal)? FULL  Are you on a modem or chip? CHIP

## 2023-08-21 ENCOUNTER — OFFICE VISIT (OUTPATIENT)
Dept: PULMONOLOGY | Facility: CLINIC | Age: 58
End: 2023-08-21
Payer: COMMERCIAL

## 2023-08-21 VITALS
DIASTOLIC BLOOD PRESSURE: 72 MMHG | SYSTOLIC BLOOD PRESSURE: 132 MMHG | HEART RATE: 66 BPM | HEIGHT: 64 IN | RESPIRATION RATE: 18 BRPM | WEIGHT: 157.8 LBS | OXYGEN SATURATION: 97 % | BODY MASS INDEX: 26.94 KG/M2

## 2023-08-21 DIAGNOSIS — G47.33 OSA (OBSTRUCTIVE SLEEP APNEA): Primary | ICD-10-CM

## 2023-08-21 DIAGNOSIS — R06.83 SNORING: ICD-10-CM

## 2023-08-21 DIAGNOSIS — E66.3 OVERWEIGHT (BMI 25.0-29.9): ICD-10-CM

## 2023-08-21 PROCEDURE — 99212 OFFICE O/P EST SF 10 MIN: CPT | Performed by: NURSE PRACTITIONER

## 2023-08-21 NOTE — PROGRESS NOTES
"Chief Complaint   Patient presents with    Sleeping Problem    Follow-up         Subjective   Diana Flores is a 57 y.o. female.   The patient comes in today for follow-up of obstructive sleep apnea.    I reviewed her home sleep study from June 2023 and discussed results with her today.  The sleep study reveals mild obstructive sleep apnea with an apnea-hypopnea index of 12/h.  Her AHI was worse in supine position at 14/h    She has been set up with AutoPap 6/14. She is having some issues with the mask. She is using a modified FFM now. She tried nasal pillows and they did not work due to breathing through her mouth. She has just been using the FFM about 1 week.     She overall feels better and more rested. She is not snoring with the mask.       The following portions of the patient's history were reviewed and updated as appropriate: allergies, current medications, past family history, past medical history, past social history, and past surgical history.      Review of Systems   HENT:  Negative for sinus pressure, sneezing and sore throat.    Respiratory:  Negative for cough, chest tightness, shortness of breath and wheezing.    Psychiatric/Behavioral:  Negative for sleep disturbance.      Objective   Visit Vitals  /72   Resp 18   Ht 162.6 cm (64\")   Wt 71.6 kg (157 lb 12.8 oz)   SpO2 97%   BMI 27.09 kg/mý       Physical Exam  Vitals reviewed.   Constitutional:       Appearance: She is well-developed.   HENT:      Head: Atraumatic.      Mouth/Throat:      Mouth: Mucous membranes are moist.      Comments: Crowded oropharynx.   Eyes:      Extraocular Movements: Extraocular movements intact.   Cardiovascular:      Rate and Rhythm: Normal rate and regular rhythm.   Musculoskeletal:      Comments: Gait normal.    Skin:     General: Skin is warm.   Neurological:      Mental Status: She is alert and oriented to person, place, and time.         Assessment & Plan   Diagnoses and all orders for this visit:    1. " REINALDO (obstructive sleep apnea) (Primary)    2. Overweight (BMI 25.0-29.9)    3. Snoring           Return for keep appt in April.    DISCUSSION (if any):  Continue treatment with AutoPAP at a pressure of 6/14, with a full-face mask.    Patient's compliance data was reviewed and the compliance is greater than 90%.    Humidification setup, hose and mask care discussed.    Weight loss advised.    Use every night for at least 4 hours stressed.       Dictated utilizing Dragon dictation.    This document was electronically signed by ZACK Pruitt August 21, 2023  15:55 EDT

## 2024-01-15 ENCOUNTER — TELEPHONE (OUTPATIENT)
Dept: CARDIOLOGY | Facility: CLINIC | Age: 59
End: 2024-01-15
Payer: COMMERCIAL

## 2024-01-15 NOTE — TELEPHONE ENCOUNTER
"LMOM TO RS DUE TO COOK ON CALL.    Relay     \"PATIENT CAN BE SCHEDULED ON NEXT AVAILABLE WITH DR. MENDEZ, LAMONT MAO OR DR. LY\"                  "

## 2024-02-19 DIAGNOSIS — I48.0 PAROXYSMAL ATRIAL FIBRILLATION WITH RVR: ICD-10-CM

## 2024-02-19 RX ORDER — METOPROLOL SUCCINATE 25 MG/1
25 TABLET, EXTENDED RELEASE ORAL DAILY
Qty: 90 TABLET | Refills: 0 | Status: SHIPPED | OUTPATIENT
Start: 2024-02-19

## 2024-02-26 DIAGNOSIS — I48.0 PAROXYSMAL ATRIAL FIBRILLATION WITH RVR: ICD-10-CM

## 2024-02-27 NOTE — PROGRESS NOTES
"             [facility] Cardiology Office Follow Up Note    Diana Flores  [id number]  2024    Primary Care Provider: Deon Garcia MD   Referring Provider: No ref. provider found    Chief Complaint: Follow-up PAF    History of Present Illness:   Mrs. Diana Flores is a 58 y.o. female who presents to the Cardiology Clinic for follow up of PAF.  The patient has a past medical history significant for hyperlipidemia.  She has a past cardiac history significant for paroxysmal atrial fibrillation, initially presenting in 2019 when she spontaneously converted to sinus rhythm.  She presents today for routine follow-up.  Since her last cardiology clinic visit, the patient was diagnosed with sleep apnea and now treats this with CPAP therapy.  She patient reports ongoing (but mild) palpitations \"off and on\" throughout the week, but reports the episodes are minimal and of no concern to her.  The patient reports feeling well with no significant changes in her health since her last cardiology.     Past Cardiac Testin. Last Coronary Angio: None  2. Prior Stress Testing: 3/22/2021   1.  Normal exercise treadmill stress test.  2.  Normal chronotropic and BP response to exercise.  3.  Average exercise capacity, reaching 10.1 METS.  4.  Low risk Duke treadmill score.  3. Last Echo: 6/15/2022 Remote (Scanned into chart)  4. Prior Holter Monitor: None    Review of Systems:   Review of Systems  As Noted in HPI.   I have reviewed and confirmed the accuracy of the ROS as documented by the MA/ALANAN/RN ZACK Eli    I have reviewed and/or updated the patient's past medical, past surgical, family, social history, problem list and allergies as appropriate.     Medications:     Current Outpatient Medications:     aspirin 81 MG EC tablet, Take 1 tablet by mouth Daily., Disp: 30 tablet, Rfl: 0    atorvastatin (LIPITOR) 40 MG tablet, Take 0.5 tablets by mouth every night at bedtime., Disp: 90 " "tablet, Rfl: 5    Calcium Carbonate-Vit D-Min (CALCIUM 1200 PO), Take 1,200 mg by mouth Daily., Disp: , Rfl:     flecainide (TAMBOCOR) 50 MG tablet, Take 1 tablet by mouth Every 12 (Twelve) Hours., Disp: 180 tablet, Rfl: 2    metoprolol succinate XL (TOPROL-XL) 25 MG 24 hr tablet, Take 1 tablet by mouth Daily. 200001, Disp: 90 tablet, Rfl: 0    Physical Exam:  Vital Signs:   Vitals:    03/06/24 1426   BP: 114/72   BP Location: Left arm   Patient Position: Sitting   Cuff Size: Adult   Pulse: 65   Resp: 16   Temp: 97.7 °F (36.5 °C)   TempSrc: Infrared   SpO2: 96%  Comment: RA   Weight: 73 kg (161 lb)   Height: 162.6 cm (64\")     Body mass index is 27.64 kg/m².    Physical Exam  Vitals and nursing note reviewed.   Constitutional:       General: She is not in acute distress.  HENT:      Head: Normocephalic and atraumatic.   Neck:      Trachea: Trachea normal.   Cardiovascular:      Rate and Rhythm: Normal rate and regular rhythm.      Pulses: Normal pulses.      Heart sounds: Normal heart sounds. No murmur heard.     No friction rub. No gallop.   Pulmonary:      Effort: Pulmonary effort is normal.      Breath sounds: Normal breath sounds.   Musculoskeletal:      Cervical back: Neck supple.      Right lower leg: No edema.      Left lower leg: No edema.   Skin:     General: Skin is warm and dry.   Neurological:      Mental Status: She is alert and oriented to person, place, and time.   Psychiatric:         Mood and Affect: Mood normal.         Behavior: Behavior normal. Behavior is cooperative.         Thought Content: Thought content does not include suicidal ideation.         Results Review:   I reviewed the patient's new clinical results.      ECG 12 Lead    Date/Time: 3/6/2024 12:26 PM  Performed by: Maday Marie APRN    Authorized by: Maday Marie APRN  Comparison: compared with previous ECG from 2/14/2023  Rhythm: sinus rhythm  Rate: normal  BPM: 63  QRS axis: left  Other findings: left ventricular " hypertrophy    Clinical impression: abnormal EKG          Assessment / Plan:   There are no diagnoses linked to this encounter.    1.  Paroxysmal atrial fibrillation with rapid ventricular rates  Continue flecainide for rhythm control strategy  Continue metoprolol for rate control  [id number] score is 1, the patient continues to decline anticoagulation     2.  Hyperlipidemia  3/22 showed , triglycerides 84, HDL 69 (most recent lipid panel available for review)  REPEAT lipid panel for surveillance; patient instructed to go fasting      Preventative Cardiology:   Tobacco Cessation: N/A   Advance Care Planning: ACP discussion was declined by the patient. Patient does not have an advance directive, declines further assistance.     Follow Up:   Return in about 1 year (around 3/6/2025) for Follow-up with Dr. Crouch.      Thank you for allowing me to participate in the care of your patient. Please do not hesitate to contact me with additional questions or concerns.     ZACK Leon

## 2024-02-28 RX ORDER — FLECAINIDE ACETATE 50 MG/1
50 TABLET ORAL EVERY 12 HOURS
Qty: 180 TABLET | Refills: 2 | Status: SHIPPED | OUTPATIENT
Start: 2024-02-28

## 2024-03-06 ENCOUNTER — OFFICE VISIT (OUTPATIENT)
Dept: CARDIOLOGY | Facility: CLINIC | Age: 59
End: 2024-03-06
Payer: COMMERCIAL

## 2024-03-06 VITALS
DIASTOLIC BLOOD PRESSURE: 72 MMHG | OXYGEN SATURATION: 96 % | SYSTOLIC BLOOD PRESSURE: 114 MMHG | HEART RATE: 65 BPM | WEIGHT: 161 LBS | RESPIRATION RATE: 16 BRPM | HEIGHT: 64 IN | BODY MASS INDEX: 27.49 KG/M2 | TEMPERATURE: 97.7 F

## 2024-03-06 DIAGNOSIS — I48.0 PAROXYSMAL ATRIAL FIBRILLATION WITH RVR: ICD-10-CM

## 2024-03-06 DIAGNOSIS — E78.00 PURE HYPERCHOLESTEROLEMIA: Primary | ICD-10-CM

## 2024-03-06 PROCEDURE — 99214 OFFICE O/P EST MOD 30 MIN: CPT | Performed by: NURSE PRACTITIONER

## 2024-03-06 PROCEDURE — 93000 ELECTROCARDIOGRAM COMPLETE: CPT | Performed by: NURSE PRACTITIONER

## 2024-04-22 ENCOUNTER — OFFICE VISIT (OUTPATIENT)
Dept: PULMONOLOGY | Facility: CLINIC | Age: 59
End: 2024-04-22
Payer: COMMERCIAL

## 2024-04-22 VITALS
HEART RATE: 78 BPM | WEIGHT: 165.8 LBS | RESPIRATION RATE: 18 BRPM | SYSTOLIC BLOOD PRESSURE: 130 MMHG | OXYGEN SATURATION: 96 % | DIASTOLIC BLOOD PRESSURE: 82 MMHG | BODY MASS INDEX: 28.31 KG/M2 | HEIGHT: 64 IN

## 2024-04-22 DIAGNOSIS — G47.33 OBSTRUCTIVE SLEEP APNEA: Primary | ICD-10-CM

## 2024-04-22 PROCEDURE — 99213 OFFICE O/P EST LOW 20 MIN: CPT | Performed by: INTERNAL MEDICINE

## 2024-04-22 NOTE — PROGRESS NOTES
"  Chief Complaint   Patient presents with    Sleeping Problem    Follow-up         Subjective   Diana Flores is a 58 y.o. female.   Patient was evaluated today for follow up of Sleep apnea.     Patient doesn't report any issues with the PAP device. The patient describes no significant issues with her mask either.     Patient says that the compliance with the use of the equipment is good.     Patient says that her symptoms of fatigue & daytime sleepiness have been helped greatly with the use of PAP, as prescribed.       The following portions of the patient's history were reviewed and updated as appropriate: allergies, current medications, past family history, past medical history, past social history, and past surgical history.    Review of Systems   HENT:  Negative for sinus pressure, sneezing and sore throat.    Respiratory:  Negative for cough, chest tightness, shortness of breath and wheezing.    Psychiatric/Behavioral:  Negative for sleep disturbance.        Objective   Visit Vitals  /82   Pulse 78   Resp 18   Ht 162.6 cm (64.02\") Comment: pt reported   Wt 75.2 kg (165 lb 12.8 oz)   SpO2 96%   BMI 28.45 kg/m²         BMI Readings from Last 3 Encounters:   04/22/24 28.45 kg/m²   03/06/24 27.64 kg/m²   01/15/24 26.95 kg/m²       Physical Exam  Vitals reviewed.   Constitutional:       Appearance: She is well-developed.   HENT:      Head: Atraumatic.      Mouth/Throat:      Comments: Oropharynx was crowded.  Neurological:      Mental Status: She is alert and oriented to person, place, and time.           Assessment & Plan   Diagnoses and all orders for this visit:    1. Obstructive sleep apnea (Primary)           Return in about 1 year (around 4/22/2025) for Stiven/Katlyn.    DISCUSSION (if any):  Sleep study performed in June 2023  AHI was 12 / hour.   Supine AHI was 14/hour.     Latest PAP device provided in July 2023  [employer] company: [employer]    Current PAP settings: 6-14  Current mask type: " FFM    Compliance data was obtained from the her device/DME company.    Continue PAP device on a regular basis, at least 4 hours or more per night.    Sleep hygiene measures were discussed    I spent a total of 22 minutes face to face with this patient. Part of this time was spent in counseling patient about the pathophysiology of underlying disease process, reviewing any available sleep studies, need to use devices (as applicable) on a regular basis and lifestyle modifications that may positively impact patient's health.  Time also includes documentation in electronic health records    Dictated utilizing Dragon dictation.    This document was electronically signed by Alexa Pitt MD on 04/22/24 at 14:21 EDT

## 2024-05-01 DIAGNOSIS — E78.00 PURE HYPERCHOLESTEROLEMIA: ICD-10-CM

## 2024-05-01 RX ORDER — ATORVASTATIN CALCIUM 40 MG/1
20 TABLET, FILM COATED ORAL
Qty: 15 TABLET | Refills: 11 | Status: SHIPPED | OUTPATIENT
Start: 2024-05-01

## 2024-05-19 DIAGNOSIS — I48.0 PAROXYSMAL ATRIAL FIBRILLATION WITH RVR: ICD-10-CM

## 2024-05-20 RX ORDER — METOPROLOL SUCCINATE 25 MG/1
25 TABLET, EXTENDED RELEASE ORAL DAILY
Qty: 90 TABLET | Refills: 3 | Status: SHIPPED | OUTPATIENT
Start: 2024-05-20

## 2024-11-18 DIAGNOSIS — I48.0 PAROXYSMAL ATRIAL FIBRILLATION WITH RVR: ICD-10-CM

## 2024-11-18 RX ORDER — FLECAINIDE ACETATE 50 MG/1
50 TABLET ORAL EVERY 12 HOURS
Qty: 180 TABLET | Refills: 2 | Status: SHIPPED | OUTPATIENT
Start: 2024-11-18

## 2024-12-02 ENCOUNTER — OFFICE VISIT (OUTPATIENT)
Dept: INTERNAL MEDICINE | Facility: CLINIC | Age: 59
End: 2024-12-02
Payer: COMMERCIAL

## 2024-12-02 VITALS
TEMPERATURE: 99 F | OXYGEN SATURATION: 97 % | SYSTOLIC BLOOD PRESSURE: 128 MMHG | DIASTOLIC BLOOD PRESSURE: 64 MMHG | HEIGHT: 64 IN | HEART RATE: 76 BPM | BODY MASS INDEX: 28.85 KG/M2 | WEIGHT: 169 LBS

## 2024-12-02 DIAGNOSIS — R63.5 WEIGHT GAIN: ICD-10-CM

## 2024-12-02 DIAGNOSIS — Z00.00 ANNUAL PHYSICAL EXAM: Primary | ICD-10-CM

## 2024-12-02 DIAGNOSIS — Z12.31 ENCOUNTER FOR SCREENING MAMMOGRAM FOR MALIGNANT NEOPLASM OF BREAST: ICD-10-CM

## 2024-12-02 DIAGNOSIS — R73.03 PREDIABETES: ICD-10-CM

## 2024-12-02 DIAGNOSIS — M85.89 OSTEOPENIA OF MULTIPLE SITES: ICD-10-CM

## 2024-12-02 DIAGNOSIS — R53.82 CHRONIC FATIGUE: ICD-10-CM

## 2024-12-02 DIAGNOSIS — E78.00 PURE HYPERCHOLESTEROLEMIA: ICD-10-CM

## 2024-12-02 DIAGNOSIS — Z76.89 ENCOUNTER TO ESTABLISH CARE: ICD-10-CM

## 2024-12-02 DIAGNOSIS — I48.0 PAROXYSMAL ATRIAL FIBRILLATION: ICD-10-CM

## 2024-12-02 DIAGNOSIS — Z80.0 FAMILY HISTORY OF PANCREATIC CANCER: ICD-10-CM

## 2024-12-02 PROCEDURE — 99396 PREV VISIT EST AGE 40-64: CPT | Performed by: NURSE PRACTITIONER

## 2024-12-02 NOTE — PROGRESS NOTES
Female Physical Note      Date: 2024   Patient Name: Diana Flores  : 1965   MRN: [id number]     Chief Complaint:    Chief Complaint   Patient presents with    Establish Care     With Saira today.    Annual Exam       History of Present Illness: Diana Flores is a 59 y.o. female who is here today to establish care and for annual health maintenance/physical.  History of A-fib, hypercholesterolemia, prediabetes, osteopenia, lumbosacral spondylosis without myelopathy  Follows cardiology.  On baby aspirin, atorvastatin, flecainide and metoprolol with good control  No chest pain, shortness of air, visual changes, headaches, edema.  She reports with having palpitations and decreased caffeine intake and this got better.  Family history of pancreatic cancer in mother  Requesting cortisol levels be checked due to weight gain and fatigue  Vaccinations and screenings reviewed    Subjective      Review of Systems:   Pertinent ROS in HPI    Past Medical History, Social History, Family History and Care Team were all reviewed with patient and updated as appropriate.     Medications:     Current Outpatient Medications:     aspirin 81 MG EC tablet, Take 1 tablet by mouth Daily., Disp: 30 tablet, Rfl: 0    atorvastatin (LIPITOR) 40 MG tablet, Take 0.5 tablets by mouth every night at bedtime., Disp: 15 tablet, Rfl: 11    Calcium Carbonate-Vit D-Min (CALCIUM 1200 PO), Take 1,200 mg by mouth Daily., Disp: , Rfl:     flecainide (TAMBOCOR) 50 MG tablet, Take 1 tablet by mouth Every 12 (Twelve) Hours., Disp: 180 tablet, Rfl: 2    metoprolol succinate XL (TOPROL-XL) 25 MG 24 hr tablet, Take 1 tablet by mouth Daily. , Disp: 90 tablet, Rfl: 3    Allergies:   No Known Allergies    Immunizations:  Immunization History   Administered Date(s) Administered    COVID-19 (MODERNA) 1st,2nd,3rd Dose Monovalent 2021, 07/15/2021       Colorectal Screening:   Last Completed Colonoscopy            COLORECTAL  "CANCER SCREENING (COLONOSCOPY - Every 10 Years) Next due on 2/22/2027 02/22/2017  Surgical Procedure: COLONOSCOPY                  Pap: Not indicated, partial hysterectomy, left ovaries    Tobacco Use: Low Risk  (12/2/2024)    Patient History     Smoking Tobacco Use: Never     Smokeless Tobacco Use: Never     Passive Exposure: Not on file       Social History     Substance and Sexual Activity   Alcohol Use Yes    Alcohol/week: 2.0 standard drinks of alcohol    Types: 1 Shots of liquor, 1 Drinks containing 0.5 oz of alcohol per week    Comment: Drink socially every now and then.        Social History     Substance and Sexual Activity   Drug Use No        PHQ-2 Depression Screening  Little interest or pleasure in doing things? Not at all   Feeling down, depressed, or hopeless? Not at all   PHQ-2 Total Score 0     Objective     Physical Exam:  Vital Signs:   Vitals:    12/02/24 1428   BP: 128/64   Pulse: 76   Temp: 99 °F (37.2 °C)   SpO2: 97%   Weight: 76.7 kg (169 lb)   Height: 162.6 cm (64.02\")   PainSc: 0-No pain       Physical Exam  Vitals and nursing note reviewed.   Constitutional:       General: She is not in acute distress.     Appearance: Normal appearance. She is overweight.   HENT:      Head: Normocephalic and atraumatic.      Right Ear: Tympanic membrane, ear canal and external ear normal.      Left Ear: Tympanic membrane, ear canal and external ear normal.      Nose: Nose normal.      Mouth/Throat:      Mouth: Mucous membranes are moist.      Pharynx: Oropharynx is clear.   Eyes:      General: No scleral icterus.     Extraocular Movements: Extraocular movements intact.      Conjunctiva/sclera: Conjunctivae normal.      Pupils: Pupils are equal, round, and reactive to light.   Neck:      Thyroid: No thyromegaly.   Cardiovascular:      Rate and Rhythm: Normal rate and regular rhythm.      Pulses:           Dorsalis pedis pulses are 2+ on the right side and 2+ on the left side.        Posterior tibial " pulses are 2+ on the right side and 2+ on the left side.      Heart sounds: Normal heart sounds.   Pulmonary:      Effort: Pulmonary effort is normal.      Breath sounds: Normal breath sounds.   Abdominal:      General: Abdomen is flat. Bowel sounds are normal. There is no distension.      Palpations: Abdomen is soft.      Tenderness: There is no abdominal tenderness. There is no guarding.   Musculoskeletal:         General: Normal range of motion.      Cervical back: Normal range of motion and neck supple.      Right lower leg: No edema.      Left lower leg: No edema.   Lymphadenopathy:      Cervical: No cervical adenopathy.   Skin:     General: Skin is warm and dry.      Findings: No rash.   Neurological:      General: No focal deficit present.      Mental Status: She is alert and oriented to person, place, and time.      Cranial Nerves: No cranial nerve deficit.      Sensory: No sensory deficit.      Motor: No weakness.      Coordination: Coordination normal.      Gait: Gait normal.   Psychiatric:         Mood and Affect: Mood normal.         Behavior: Behavior normal.         Thought Content: Thought content normal.         Assessment / Plan      Assessment/Plan:   Diagnoses and all orders for this visit:    1. Annual physical exam (Primary)  -     CBC (No Diff)  -     Comprehensive Metabolic Panel  -     Lipid Panel  -     TSH Rfx On Abnormal To Free T4  -     Hemoglobin A1c  -     Vitamin D,25-Hydroxy  -     Vitamin B12  -     Lipase  -     Amylase    2. Encounter to establish care  -     CBC (No Diff)  -     Comprehensive Metabolic Panel  -     Lipid Panel  -     TSH Rfx On Abnormal To Free T4  -     Hemoglobin A1c  -     Vitamin D,25-Hydroxy  -     Vitamin B12  -     Lipase  -     Amylase    3. Paroxysmal atrial fibrillation  -     CBC (No Diff)  -     Comprehensive Metabolic Panel  -     Lipid Panel  -     TSH Rfx On Abnormal To Free T4    4. Pure hypercholesterolemia  -     Lipid Panel    5. Prediabetes  -      Comprehensive Metabolic Panel  -     Hemoglobin A1c    6. Osteopenia of multiple sites  -     Comprehensive Metabolic Panel  -     Vitamin D,25-Hydroxy    7. Encounter for screening mammogram for malignant neoplasm of breast  -     Mammo Screening Digital Tomosynthesis Bilateral With CAD    8. Chronic fatigue  -     CBC (No Diff)  -     Comprehensive Metabolic Panel  -     Lipid Panel  -     TSH Rfx On Abnormal To Free T4  -     Hemoglobin A1c  -     Vitamin D,25-Hydroxy  -     Vitamin B12  -     Cortisol - AM    9. Weight gain  -     CBC (No Diff)  -     Comprehensive Metabolic Panel  -     Lipid Panel  -     TSH Rfx On Abnormal To Free T4  -     Hemoglobin A1c  -     Vitamin D,25-Hydroxy  -     Vitamin B12  -     Cortisol - AM    10. Family history of pancreatic cancer  -     Lipase  -     Amylase  -     Ambulatory Referral to Genetic Counseling/Testing       Plan:  A-fib, hypercholesterolemia-stable, update labs fasting, follow-up with cardiology regularly for monitoring, continue medications as prescribed, monitor blood pressure/pulse.  Follow a low-cholesterol diet and exercise regimen 150 minutes/week.  Prediabetes-follow diet/exercise regimen, eliminate sugars, update A1c/fasting glucose  Osteopenia-recommend vitamin D/calcium, weightbearing exercises, limiting caffeine, getting 15-20 minutes of sunlight daily  Chronic fatigue/weight gain-Labs ordered to further evaluate, pt requesting cortisol.  Discussed for weight gain she needs to eat in a calorie deficit, track calories on an jose such as my fitness pal/lose it.  Get regular cardiovascular exercise minimal 150 minutes/week.  Discussed weight gain can also be hormonal related due to menopause, she does still have ovaries.  Get 7-9 hours of sleep per night, exercise regularly, eat a healthy balanced diet to help with fatigue.  If frequent awakening, snoring noted by partner, continue daytime fatigue can consider a sleep study.  Family history of  pancreatic cancer in mother-can check a baseline lipase, amylase. She does not have any unintentional weight loss, abdominal pain, NVD. Will refer to genetic counselor to see if any increased genetic disposition.  Can consider imaging for screening.     Healthcare Maintenance:  Counseling provided based on age appropriate USPSTF guidelines and vaccinations   Encouraged 150 minutes of exercise total per week for heart health   Recommend balanced healthy diet   Dental visits twice per year, yearly eye exams, yearly skin assessments with dermatology   Pap not indicated, self breast exam once per month, mammogram annually (ordered)  Colonoscopy every 10 years or cologuard every 3 years   BMI is >= 25 and <30. (Overweight) The following options were offered after discussion;: information on healthy weight added to patient's after visit summary       Diana MARLEN Flores voices understanding and acceptance of this advice and will call back with any further questions or concerns. AVS with preventive healthcare tips printed for patient.     Follow Up:   Return in about 1 year (around 12/2/2025) for Annual Physical.      ZACK Thibodeaux  [facility]

## 2025-01-02 ENCOUNTER — TELEPHONE (OUTPATIENT)
Dept: INTERNAL MEDICINE | Facility: CLINIC | Age: 60
End: 2025-01-02
Payer: COMMERCIAL

## 2025-01-02 NOTE — TELEPHONE ENCOUNTER
Caller: Diana Flores    Relationship: Self    Best call back number: [phone]     What orders are you requesting (i.e. lab or imaging): TB ASSESSMENT     In what timeframe would the patient need to come in: AS SOON AS POSSIBLE    Where will you receive your lab/imaging services: IN OFFICE    Additional notes: PLEASE CALL PATIENT IF THIS CAN BE DONE IN OFFICE AND IF AND WHEN ORDER CAN BE PLACED.   
Pt also sent a YesPlz! message in regards to this, I've already responded to the message.   
MAR

## 2025-01-03 ENCOUNTER — CLINICAL SUPPORT (OUTPATIENT)
Dept: INTERNAL MEDICINE | Facility: CLINIC | Age: 60
End: 2025-01-03
Payer: COMMERCIAL

## 2025-02-26 ENCOUNTER — HOSPITAL ENCOUNTER (OUTPATIENT)
Dept: MAMMOGRAPHY | Facility: HOSPITAL | Age: 60
Discharge: HOME | End: 2025-02-26
Admitting: NURSE PRACTITIONER
Payer: OTHER GOVERNMENT

## 2025-02-26 PROCEDURE — 77063 BREAST TOMOSYNTHESIS BI: CPT

## 2025-02-26 PROCEDURE — 77067 SCR MAMMO BI INCL CAD: CPT

## 2025-03-10 ENCOUNTER — OFFICE VISIT (OUTPATIENT)
Dept: CARDIOLOGY | Facility: CLINIC | Age: 60
End: 2025-03-10
Payer: OTHER GOVERNMENT

## 2025-03-10 VITALS
HEART RATE: 73 BPM | BODY MASS INDEX: 28.1 KG/M2 | SYSTOLIC BLOOD PRESSURE: 130 MMHG | WEIGHT: 164.6 LBS | HEIGHT: 64 IN | DIASTOLIC BLOOD PRESSURE: 72 MMHG | OXYGEN SATURATION: 97 %

## 2025-03-10 DIAGNOSIS — E78.00 PURE HYPERCHOLESTEROLEMIA: ICD-10-CM

## 2025-03-10 DIAGNOSIS — I48.0 PAROXYSMAL ATRIAL FIBRILLATION WITH RVR: Primary | ICD-10-CM

## 2025-03-10 PROCEDURE — 99213 OFFICE O/P EST LOW 20 MIN: CPT | Performed by: INTERNAL MEDICINE

## 2025-03-10 PROCEDURE — 93000 ELECTROCARDIOGRAM COMPLETE: CPT | Performed by: INTERNAL MEDICINE

## 2025-03-10 NOTE — PROGRESS NOTES
[facility] Office Follow Up Note    Diana Flores  [id number]  03/10/2025    Primary Care Provider: Saira Lorenzo APRN    Chief Complaint: Routine follow-up    History of Present Illness:   Mrs. Diana Flores is a 59 y.o. female who presents to the Cardiology Clinic for routine follow-up.  The patient has a past medical history significant for hyperlipidemia.  She has a past cardiac history significant for paroxysmal atrial fibrillation, initially presenting in 2019 when she spontaneously converted to sinus rhythm.  Entrolax appointment, the patient has continued to do well from an A-fib standpoint.  Her episodes of palpitations are rare, and brief in duration lasting several seconds before resolving spontaneously.  No sustained episodes of palpitations.  She continues to tolerate flecainide and metoprolol without difficulty.  No other specific complaints today.      Past Cardiac Testin. Last Coronary Angio: None  2. Prior Stress Testing:  [facility] 3/22/2021              1.  Normal exercise treadmill stress test.              2.  Normal chronotropic and BP response to exercise.              3.  Average exercise capacity, reaching 10.1 METS.              4.  Low risk [facility] treadmill score.  3. Last Echo: 2019              1.  Normal left ventricular systolic function              2.  Trace MR and TR  4. Prior Holter Monitor: None    Review of Systems:   Review of Systems   Constitutional:  Negative for activity change, appetite change, chills, diaphoresis, fatigue, fever, unexpected weight gain and unexpected weight loss.   Eyes:  Negative for blurred vision and double vision.   Respiratory:  Negative for cough, chest tightness, shortness of breath and wheezing.    Cardiovascular:  Positive for palpitations. Negative for chest pain and leg swelling.   Gastrointestinal:  Negative for abdominal pain, anal bleeding, blood in stool and GERD.   Endocrine: Negative  "for cold intolerance and heat intolerance.   Genitourinary:  Negative for hematuria.   Neurological:  Negative for dizziness, syncope, weakness and light-headedness.   Hematological:  Does not bruise/bleed easily.   Psychiatric/Behavioral:  Negative for depressed mood and stress. The patient is not nervous/anxious.        I have reviewed and/or updated the patient's past medical, past surgical, family, social history, problem list and allergies as appropriate.     Medications:     Current Outpatient Medications:     aspirin 81 MG EC tablet, Take 1 tablet by mouth Daily., Disp: 30 tablet, Rfl: 0    atorvastatin (LIPITOR) 40 MG tablet, Take 0.5 tablets by mouth every night at bedtime., Disp: 15 tablet, Rfl: 11    Calcium Carbonate-Vit D-Min (CALCIUM 1200 PO), Take 1,200 mg by mouth Daily., Disp: , Rfl:     flecainide (TAMBOCOR) 50 MG tablet, Take 1 tablet by mouth Every 12 (Twelve) Hours., Disp: 180 tablet, Rfl: 2    metoprolol succinate XL (TOPROL-XL) 25 MG 24 hr tablet, Take 1 tablet by mouth Daily. 200001, Disp: 90 tablet, Rfl: 3    Physical Exam:  Vital Signs:   Vitals:    03/10/25 1139   BP: 130/72   BP Location: Left arm   Patient Position: Sitting   Cuff Size: Adult   Pulse: 73   SpO2: 97%   Weight: 74.7 kg (164 lb 9.6 oz)   Height: 162.6 cm (64\")       Physical Exam  Constitutional:       General: She is not in acute distress.     Appearance: Normal appearance. She is well-developed. She is not diaphoretic.   HENT:      Head: Normocephalic and atraumatic.   Eyes:      General: No scleral icterus.     Pupils: Pupils are equal, round, and reactive to light.   Neck:      Trachea: No tracheal deviation.   Cardiovascular:      Rate and Rhythm: Normal rate and regular rhythm.      Heart sounds: Normal heart sounds. No murmur heard.     No friction rub. No gallop.      Comments: Normal JVD.    Pulmonary:      Effort: Pulmonary effort is normal. No respiratory distress.      Breath sounds: Normal breath sounds. No " stridor. No wheezing or rales.   Chest:      Chest wall: No tenderness.   Abdominal:      General: Bowel sounds are normal. There is no distension.      Palpations: Abdomen is soft.      Tenderness: There is no abdominal tenderness. There is no guarding or rebound.   Musculoskeletal:         General: No swelling. Normal range of motion.      Cervical back: Neck supple. No tenderness.   Lymphadenopathy:      Cervical: No cervical adenopathy.   Skin:     General: Skin is warm and dry.      Findings: No erythema.   Neurological:      General: No focal deficit present.      Mental Status: She is alert and oriented to person, place, and time.   Psychiatric:         Mood and Affect: Mood normal.         Behavior: Behavior normal.         Results Review:   I reviewed the patient's new clinical results.      ECG 12 Lead    Date/Time: 3/10/2025 11:57 AM  Performed by: Jaylon Crouch MD    Authorized by: Jaylon Crouch MD  Comparison: not compared with previous ECG   Rhythm: sinus rhythm  Rate: normal  QRS axis: left    Clinical impression: abnormal EKG          Assessment / Plan:     1.  Paroxysmal atrial fibrillation with rapid ventricular rates  -- Signs of palpitations are rare, brief in duration  -- ECG today shows sinus rhythm  --Continue flecainide for rhythm control strategy  --Continue metoprolol XL  --[id number] score is 1, the patient continues to defer anticoagulation for now, continue aspirin     2.  Hyperlipidemia  -- Continue atorvastatin    Follow Up:   Return in about 1 year (around 3/10/2026).      Thank you for allowing me to participate in the care of your patient. Please to not hesitate to contact me with additional questions or concerns.     CORBY Crouch MD  Interventional Cardiology   03/10/2025  11:40 EDT

## 2025-04-23 ENCOUNTER — OFFICE VISIT (OUTPATIENT)
Dept: PULMONOLOGY | Facility: CLINIC | Age: 60
End: 2025-04-23
Payer: OTHER GOVERNMENT

## 2025-04-23 VITALS
OXYGEN SATURATION: 95 % | BODY MASS INDEX: 28 KG/M2 | HEIGHT: 64 IN | DIASTOLIC BLOOD PRESSURE: 60 MMHG | HEART RATE: 73 BPM | WEIGHT: 164 LBS | RESPIRATION RATE: 18 BRPM | SYSTOLIC BLOOD PRESSURE: 118 MMHG

## 2025-04-23 DIAGNOSIS — G47.33 OSA (OBSTRUCTIVE SLEEP APNEA): Primary | ICD-10-CM

## 2025-04-23 DIAGNOSIS — E66.3 OVERWEIGHT (BMI 25.0-29.9): ICD-10-CM

## 2025-04-23 RX ORDER — ERGOCALCIFEROL 1.25 MG/1
CAPSULE ORAL
COMMUNITY
Start: 2025-01-03

## 2025-04-23 NOTE — PROGRESS NOTES
"Chief Complaint   Patient presents with    Sleeping Problem    Follow-up         Subjective   Diana Flores is a 59 y.o. female.   Patient comes back today for follow up of Obstructive Sleep apnea.     Patient says that she is compliant with her device and using it regularly.    Patient's symptoms of sleep disturbance and daytime sleepiness have been helped greatly with the use of PAP device, as prescribed.  Overall she feels more rested upon awakening with machine.      The following portions of the patient's history were reviewed and updated as appropriate: allergies, current medications, past family history, past medical history, past social history, and past surgical history.    Review of Systems   HENT:  Negative for sinus pressure.    Respiratory:  Negative for shortness of breath.    Psychiatric/Behavioral:  Negative for sleep disturbance.        Objective   Visit Vitals  /60   Pulse 73   Resp 18   Ht 162.6 cm (64\")   Wt 74.4 kg (164 lb)   SpO2 95%   BMI 28.15 kg/m²       Physical Exam  HENT:      Head: Atraumatic.      Mouth/Throat:      Mouth: Mucous membranes are moist.      Comments: Crowded oropharynx.   Eyes:      Extraocular Movements: Extraocular movements intact.   Cardiovascular:      Rate and Rhythm: Normal rate and regular rhythm.   Pulmonary:      Effort: Pulmonary effort is normal. No respiratory distress.   Musculoskeletal:      Cervical back: Neck supple.   Neurological:      Mental Status: She is alert and oriented to person, place, and time.           Assessment & Plan   Diagnoses and all orders for this visit:    1. REINALDO (obstructive sleep apnea) (Primary)  -     BIPAP / CPAP Adjustment    2. Overweight (BMI 25.0-29.9)           Return in about 55 weeks (around 5/13/2026) for Recheck, For Dr. Pitt, Sleep ONLY.    DISCUSSION (if any):  Sleep study performed in June 2023  AHI was 12 / hour.   Supine AHI was 14/hour.      Latest PAP device provided in July 2023  DME company: " [facility]     Current PAP settings: 6-14  Current mask type: FFM    I have placed an order to change AutoPAP to 10-16 from 6 -14 cm. She is needing a pressure of 12 cm on regular basis.     Continue treatment with AutoPAP at a pressure of 10-16, with a full-face mask.    Patient's compliance data was reviewed and the compliance is 100%.    Humidification setup, hose and mask care discussed.    Weight loss advised.    Use every night for at least 4 hours stressed.     Dictated utilizing Dragon dictation.    This document was electronically signed by ZACK Pruitt April 23, 2025  16:43 EDT

## 2025-05-18 DIAGNOSIS — I48.0 PAROXYSMAL ATRIAL FIBRILLATION WITH RVR: ICD-10-CM

## 2025-05-19 RX ORDER — METOPROLOL SUCCINATE 25 MG/1
25 TABLET, EXTENDED RELEASE ORAL DAILY
Qty: 90 TABLET | Refills: 3 | Status: SHIPPED | OUTPATIENT
Start: 2025-05-19

## 2025-06-01 DIAGNOSIS — E78.00 PURE HYPERCHOLESTEROLEMIA: ICD-10-CM

## 2025-06-02 RX ORDER — ATORVASTATIN CALCIUM 20 MG/1
20 TABLET, FILM COATED ORAL
Qty: 90 TABLET | Refills: 3 | Status: SHIPPED | OUTPATIENT
Start: 2025-06-02

## 2025-06-02 RX ORDER — ATORVASTATIN CALCIUM 40 MG/1
20 TABLET, FILM COATED ORAL
Qty: 15 TABLET | Refills: 11 | Status: SHIPPED | OUTPATIENT
Start: 2025-06-02 | End: 2025-06-02

## 2025-06-02 NOTE — TELEPHONE ENCOUNTER
Labs scanned in from 12/2024--ok to refill.    Electronically signed by ZACK Morelos, 06/02/25, 11:39 AM EDT.

## 2025-08-22 DIAGNOSIS — I48.0 PAROXYSMAL ATRIAL FIBRILLATION WITH RVR: ICD-10-CM

## 2025-08-25 RX ORDER — FLECAINIDE ACETATE 50 MG/1
50 TABLET ORAL EVERY 12 HOURS
Qty: 180 TABLET | Refills: 2 | Status: SHIPPED | OUTPATIENT
Start: 2025-08-25

## (undated) DEVICE — Device

## (undated) DEVICE — 4.5 MM FULL RADIUS STRAIGHT                                    BLADES, POWER/EP-1, YELLOW, PACKAGED                                    6 PER BOX, STERILE: Brand: DYONICS

## (undated) DEVICE — GLV SURG SENSICARE GREEN W/ALOE PF LF 8 STRL

## (undated) DEVICE — SPNG GZ WOVN 4X4IN 12PLY 10/BX STRL

## (undated) DEVICE — DRSNG GZ PETROLTM XEROFORM CURAD 1X8IN STRL

## (undated) DEVICE — DRSNG WND GZ CURAD OIL EMULSION 3X3IN STRL

## (undated) DEVICE — GOWN,SIRUS,NON REINFRCD,LARGE,SET IN SL: Brand: MEDLINE

## (undated) DEVICE — SUT MNCRYL PLS ANTIB UD 4/0 PS2 18IN

## (undated) DEVICE — HOOK LOCK LATEX FREE ELASTIC BANDAGE D/L 6INX10YD

## (undated) DEVICE — GLV SURG SENSICARE W/ALOE PF LF 7.5 STRL

## (undated) DEVICE — BNDG ELAS MATRX V/CLS 4IN 5YD LF

## (undated) DEVICE — BNDG ESMARK 6INX9FT STRL

## (undated) DEVICE — EMERALD ARTHROSCOPY SHEET: Brand: CONVERTORS

## (undated) DEVICE — PAD GRND REM POLYHESIVE A/ DISP

## (undated) DEVICE — ENDOGATOR AUXILIARY WATER JET CONNECTOR: Brand: ENDOGATOR

## (undated) DEVICE — BNDG ELAS MATRX V/CLS 6IN 5YD LF

## (undated) DEVICE — JELLY,LUBE,STERILE,FLIP TOP,TUBE,2-OZ: Brand: MEDLINE

## (undated) DEVICE — DYONICS 25 PATIENT TUBE SET MUST                                    BE USED WITH 7211007, 12 PER BOX

## (undated) DEVICE — PK KN ARTHSCP 20

## (undated) DEVICE — GLV SURG SENSICARE PI LF PF 8 GRN STRL

## (undated) DEVICE — CUFF SCD HEMOFORCE SEQ CALF STD MD

## (undated) DEVICE — CAST PADDING 6 IN KIT: Brand: CARDINAL HEALTH

## (undated) DEVICE — OCCLUSIVE GAUZE STRIP,3% BISMUTH TRIBROMOPHENATE IN PETROLATUM BLEND: Brand: XEROFORM

## (undated) DEVICE — GOWN,PREVENTION PLUS,LARGE,STERILE: Brand: MEDLINE